# Patient Record
Sex: MALE | Race: WHITE | NOT HISPANIC OR LATINO | Employment: FULL TIME | URBAN - METROPOLITAN AREA
[De-identification: names, ages, dates, MRNs, and addresses within clinical notes are randomized per-mention and may not be internally consistent; named-entity substitution may affect disease eponyms.]

---

## 2017-02-07 DIAGNOSIS — I25.10 CORONARY ARTERY DISEASE INVOLVING NATIVE CORONARY ARTERY OF NATIVE HEART WITHOUT ANGINA PECTORIS: Primary | ICD-10-CM

## 2017-02-07 RX ORDER — METOPROLOL SUCCINATE 25 MG/1
25 TABLET, EXTENDED RELEASE ORAL DAILY
Qty: 90 TABLET | Refills: 3 | Status: SHIPPED | OUTPATIENT
Start: 2017-02-07 | End: 2018-01-30

## 2017-03-07 DIAGNOSIS — I25.10 CORONARY ARTERY DISEASE INVOLVING NATIVE CORONARY ARTERY OF NATIVE HEART, ANGINA PRESENCE UNSPECIFIED: ICD-10-CM

## 2017-03-07 DIAGNOSIS — I21.3 ST ELEVATION MYOCARDIAL INFARCTION (STEMI), UNSPECIFIED ARTERY (H): ICD-10-CM

## 2017-03-07 DIAGNOSIS — E78.5 HYPERLIPIDEMIA LDL GOAL <70: ICD-10-CM

## 2017-03-07 RX ORDER — ROSUVASTATIN CALCIUM 40 MG/1
40 TABLET, COATED ORAL DAILY
Qty: 90 TABLET | Refills: 3 | Status: SHIPPED | OUTPATIENT
Start: 2017-03-07 | End: 2017-03-09

## 2017-03-07 RX ORDER — EZETIMIBE 10 MG/1
10 TABLET ORAL EVERY EVENING
Qty: 90 TABLET | Refills: 3 | Status: SHIPPED | OUTPATIENT
Start: 2017-03-07 | End: 2017-03-09

## 2017-03-07 RX ORDER — LISINOPRIL 10 MG/1
20 TABLET ORAL DAILY
Qty: 180 TABLET | Refills: 3 | Status: SHIPPED | OUTPATIENT
Start: 2017-03-07 | End: 2017-03-09

## 2017-03-07 RX ORDER — PRASUGREL 10 MG/1
10 TABLET, FILM COATED ORAL DAILY
Qty: 90 TABLET | Refills: 3 | Status: SHIPPED | OUTPATIENT
Start: 2017-03-07 | End: 2017-03-09

## 2017-03-09 DIAGNOSIS — E78.5 HYPERLIPIDEMIA LDL GOAL <70: ICD-10-CM

## 2017-03-09 DIAGNOSIS — I25.10 CORONARY ARTERY DISEASE INVOLVING NATIVE CORONARY ARTERY OF NATIVE HEART, ANGINA PRESENCE UNSPECIFIED: ICD-10-CM

## 2017-03-09 DIAGNOSIS — I21.3 ST ELEVATION MYOCARDIAL INFARCTION (STEMI), UNSPECIFIED ARTERY (H): ICD-10-CM

## 2017-03-09 RX ORDER — LISINOPRIL 10 MG/1
20 TABLET ORAL DAILY
Qty: 180 TABLET | Refills: 3 | Status: SHIPPED | OUTPATIENT
Start: 2017-03-09 | End: 2017-08-31

## 2017-03-09 RX ORDER — ROSUVASTATIN CALCIUM 40 MG/1
40 TABLET, COATED ORAL DAILY
Qty: 90 TABLET | Refills: 3 | Status: SHIPPED | OUTPATIENT
Start: 2017-03-09 | End: 2018-02-15

## 2017-03-09 RX ORDER — EZETIMIBE 10 MG/1
10 TABLET ORAL EVERY EVENING
Qty: 90 TABLET | Refills: 3 | Status: SHIPPED | OUTPATIENT
Start: 2017-03-09 | End: 2018-02-15

## 2017-03-09 RX ORDER — PRASUGREL 10 MG/1
10 TABLET, FILM COATED ORAL DAILY
Qty: 90 TABLET | Refills: 3 | Status: SHIPPED | OUTPATIENT
Start: 2017-03-09 | End: 2018-02-15

## 2017-08-31 DIAGNOSIS — I25.10 CORONARY ARTERY DISEASE INVOLVING NATIVE CORONARY ARTERY OF NATIVE HEART, ANGINA PRESENCE UNSPECIFIED: ICD-10-CM

## 2017-08-31 RX ORDER — LISINOPRIL 20 MG/1
20 TABLET ORAL DAILY
Qty: 90 TABLET | Refills: 3 | Status: SHIPPED | OUTPATIENT
Start: 2017-08-31 | End: 2018-02-15

## 2017-10-17 DIAGNOSIS — E78.5 HYPERLIPIDEMIA LDL GOAL <100: Primary | ICD-10-CM

## 2017-12-18 ENCOUNTER — HOSPITAL ENCOUNTER (OUTPATIENT)
Dept: CARDIOLOGY | Facility: CLINIC | Age: 67
Discharge: HOME | End: 2017-12-18
Attending: INTERNAL MEDICINE | Admitting: INTERNAL MEDICINE
Payer: COMMERCIAL

## 2017-12-18 DIAGNOSIS — E78.5 HYPERLIPIDEMIA LDL GOAL <100: ICD-10-CM

## 2017-12-18 DIAGNOSIS — I21.29 ST ELEVATION MYOCARDIAL INFARCTION (STEMI) INVOLVING OTHER CORONARY ARTERY (H): ICD-10-CM

## 2017-12-18 DIAGNOSIS — E78.5 HYPERLIPIDEMIA LDL GOAL <70: ICD-10-CM

## 2017-12-18 DIAGNOSIS — I25.5 ISCHEMIC CARDIOMYOPATHY: ICD-10-CM

## 2017-12-18 DIAGNOSIS — E78.00 HYPERCHOLESTEROLEMIA: ICD-10-CM

## 2017-12-18 DIAGNOSIS — I25.10 CORONARY ARTERY DISEASE INVOLVING NATIVE CORONARY ARTERY OF NATIVE HEART, ANGINA PRESENCE UNSPECIFIED: ICD-10-CM

## 2017-12-18 DIAGNOSIS — I10 BENIGN HYPERTENSION: ICD-10-CM

## 2017-12-18 DIAGNOSIS — I21.3 ST ELEVATION MYOCARDIAL INFARCTION (STEMI), UNSPECIFIED ARTERY (H): ICD-10-CM

## 2017-12-18 DIAGNOSIS — I25.10 CORONARY ARTERY DISEASE INVOLVING NATIVE CORONARY ARTERY OF NATIVE HEART WITHOUT ANGINA PECTORIS: ICD-10-CM

## 2017-12-18 LAB
CHOLEST SERPL-MCNC: 143 MG/DL
HDLC SERPL-MCNC: 50 MG/DL
LDLC SERPL CALC-MCNC: 73 MG/DL
NONHDLC SERPL-MCNC: 93 MG/DL
TRIGL SERPL-MCNC: 101 MG/DL

## 2017-12-18 PROCEDURE — 93325 DOPPLER ECHO COLOR FLOW MAPG: CPT | Mod: 26 | Performed by: INTERNAL MEDICINE

## 2017-12-18 PROCEDURE — 80061 LIPID PANEL: CPT | Performed by: INTERNAL MEDICINE

## 2017-12-18 PROCEDURE — 36415 COLL VENOUS BLD VENIPUNCTURE: CPT | Performed by: INTERNAL MEDICINE

## 2017-12-18 PROCEDURE — 93016 CV STRESS TEST SUPVJ ONLY: CPT | Performed by: INTERNAL MEDICINE

## 2017-12-18 PROCEDURE — 93350 STRESS TTE ONLY: CPT | Mod: 26 | Performed by: INTERNAL MEDICINE

## 2017-12-18 PROCEDURE — 40000264 ECHO STRESS TEST WITH LUMASON

## 2017-12-18 PROCEDURE — 25500064 ZZH RX 255 OP 636: Performed by: INTERNAL MEDICINE

## 2017-12-18 PROCEDURE — 93321 DOPPLER ECHO F-UP/LMTD STD: CPT | Mod: 26 | Performed by: INTERNAL MEDICINE

## 2017-12-18 PROCEDURE — 93018 CV STRESS TEST I&R ONLY: CPT | Performed by: INTERNAL MEDICINE

## 2017-12-18 RX ADMIN — SULFUR HEXAFLUORIDE 5 ML: KIT at 09:21

## 2017-12-28 ENCOUNTER — CARE COORDINATION (OUTPATIENT)
Dept: CARDIOLOGY | Facility: CLINIC | Age: 67
End: 2017-12-28

## 2017-12-28 NOTE — PROGRESS NOTES
Called patient to give him his stress echo results, no ischemia noted.  Patient feels great.  Lipids are WNL.  Patient scheduled to see Dr. Hartmann in 2/2018.

## 2018-01-30 DIAGNOSIS — I25.10 CORONARY ARTERY DISEASE INVOLVING NATIVE CORONARY ARTERY OF NATIVE HEART WITHOUT ANGINA PECTORIS: ICD-10-CM

## 2018-01-30 RX ORDER — METOPROLOL SUCCINATE 25 MG/1
25 TABLET, EXTENDED RELEASE ORAL DAILY
Qty: 15 TABLET | Refills: 0 | Status: SHIPPED | OUTPATIENT
Start: 2018-01-30 | End: 2018-02-15

## 2018-02-09 ENCOUNTER — PRE VISIT (OUTPATIENT)
Dept: CARDIOLOGY | Facility: CLINIC | Age: 68
End: 2018-02-09

## 2018-02-15 ENCOUNTER — OFFICE VISIT (OUTPATIENT)
Dept: CARDIOLOGY | Facility: CLINIC | Age: 68
End: 2018-02-15
Attending: INTERNAL MEDICINE
Payer: COMMERCIAL

## 2018-02-15 VITALS
DIASTOLIC BLOOD PRESSURE: 76 MMHG | HEART RATE: 72 BPM | SYSTOLIC BLOOD PRESSURE: 132 MMHG | WEIGHT: 205 LBS | HEIGHT: 73 IN | BODY MASS INDEX: 27.17 KG/M2

## 2018-02-15 DIAGNOSIS — I21.3 ST ELEVATION MYOCARDIAL INFARCTION (STEMI), UNSPECIFIED ARTERY (H): ICD-10-CM

## 2018-02-15 DIAGNOSIS — I25.10 CORONARY ARTERY DISEASE INVOLVING NATIVE CORONARY ARTERY OF NATIVE HEART, ANGINA PRESENCE UNSPECIFIED: ICD-10-CM

## 2018-02-15 DIAGNOSIS — I25.10 CORONARY ARTERY DISEASE INVOLVING NATIVE CORONARY ARTERY OF NATIVE HEART WITHOUT ANGINA PECTORIS: ICD-10-CM

## 2018-02-15 DIAGNOSIS — I21.29 ST ELEVATION MYOCARDIAL INFARCTION (STEMI) INVOLVING OTHER CORONARY ARTERY (H): ICD-10-CM

## 2018-02-15 DIAGNOSIS — E78.00 HYPERCHOLESTEROLEMIA: ICD-10-CM

## 2018-02-15 DIAGNOSIS — I10 BENIGN HYPERTENSION: ICD-10-CM

## 2018-02-15 DIAGNOSIS — I25.5 ISCHEMIC CARDIOMYOPATHY: ICD-10-CM

## 2018-02-15 DIAGNOSIS — E78.5 HYPERLIPIDEMIA LDL GOAL <70: ICD-10-CM

## 2018-02-15 PROCEDURE — 99214 OFFICE O/P EST MOD 30 MIN: CPT | Performed by: INTERNAL MEDICINE

## 2018-02-15 RX ORDER — PRASUGREL 10 MG/1
10 TABLET, FILM COATED ORAL DAILY
Qty: 90 TABLET | Refills: 3 | Status: SHIPPED | OUTPATIENT
Start: 2018-02-15 | End: 2019-04-29

## 2018-02-15 RX ORDER — METOPROLOL SUCCINATE 25 MG/1
25 TABLET, EXTENDED RELEASE ORAL DAILY
Qty: 90 TABLET | Refills: 3 | Status: CANCELLED | OUTPATIENT
Start: 2018-02-15

## 2018-02-15 RX ORDER — METOPROLOL SUCCINATE 25 MG/1
25 TABLET, EXTENDED RELEASE ORAL DAILY
Qty: 90 TABLET | Refills: 3 | Status: SHIPPED | OUTPATIENT
Start: 2018-02-15 | End: 2019-02-05

## 2018-02-15 RX ORDER — EZETIMIBE 10 MG/1
10 TABLET ORAL EVERY EVENING
Qty: 90 TABLET | Refills: 3 | Status: SHIPPED | OUTPATIENT
Start: 2018-02-15 | End: 2019-03-25

## 2018-02-15 RX ORDER — LISINOPRIL 20 MG/1
20 TABLET ORAL DAILY
Qty: 90 TABLET | Refills: 3 | Status: SHIPPED | OUTPATIENT
Start: 2018-02-15 | End: 2019-04-23

## 2018-02-15 RX ORDER — ROSUVASTATIN CALCIUM 40 MG/1
40 TABLET, COATED ORAL DAILY
Qty: 90 TABLET | Refills: 3 | Status: SHIPPED | OUTPATIENT
Start: 2018-02-15 | End: 2019-04-22

## 2018-02-15 RX ORDER — NITROGLYCERIN 0.4 MG/1
0.4 TABLET SUBLINGUAL EVERY 5 MIN PRN
Qty: 25 TABLET | Refills: 3 | Status: SHIPPED | OUTPATIENT
Start: 2018-02-15 | End: 2019-08-21

## 2018-02-15 NOTE — MR AVS SNAPSHOT
After Visit Summary   2/15/2018    Bridger Castillo    MRN: [id number]           Patient Information     Date Of Birth          1950        Visit Information        Provider Department      2/15/2018 1:45 PM August Hartmann MD [facility]        Today's Diagnoses     ST elevation myocardial infarction (STEMI) involving other coronary artery (H)        Benign hypertension        Hypercholesterolemia        Ischemic cardiomyopathy        Hyperlipidemia LDL goal <70        ST elevation myocardial infarction (STEMI), unspecified artery (H)        Coronary artery disease involving native coronary artery of native heart without angina pectoris        Coronary artery disease involving native coronary artery of native heart, angina presence unspecified           Follow-ups after your visit        Additional Services     Follow-Up with Cardiologist                 Future tests that were ordered for you today     Open Future Orders        Priority Expected Expires Ordered    Lipid Profile Routine 2/15/2019 2/15/2019 2/15/2018    ALT Routine 2/15/2019 2/15/2019 2/15/2018    Follow-Up with Cardiologist Routine 2/15/2019 2/16/2019 2/15/2018            Who to contact     If you have questions or need follow up information about today's clinic visit or your schedule please contact [facility] directly at [phone].  Normal or non-critical lab and imaging results will be communicated to you by MyChart, letter or phone within 4 business days after the clinic has received the results. If you do not hear from us within 7 days, please contact the clinic through MyChart or phone. If you have a critical or abnormal lab result, we will notify you by phone as soon as possible.  Submit refill requests through [employer] or call your pharmacy and they will forward the refill request to us. Please allow 3 business days for your refill to be  "completed.          Additional Information About Your Visit        [employer] Information     [employer] gives you secure access to your electronic health record. If you see a primary care provider, you can also send messages to your care team and make appointments. If you have questions, please call your primary care clinic.  If you do not have a primary care provider, please call [phone] and they will assist you.        Care EveryWhere ID     This is your Care EveryWhere ID. This could be used by other organizations to access your [facility] medical records  [id number]        Your Vitals Were     Pulse Height BMI (Body Mass Index)             72 1.842 m (6' 0.5\") 27.42 kg/m2          Blood Pressure from Last 3 Encounters:   02/15/18 132/76   12/12/16 130/76   12/21/15 148/80    Weight from Last 3 Encounters:   02/15/18 93 kg (205 lb)   12/12/16 90.8 kg (200 lb 1.6 oz)   12/21/15 90.7 kg (200 lb)              We Performed the Following     Follow-Up with Cardiologist          Where to get your medicines      These medications were sent to [place] [id number] [place] [address]  [address] [address]     Phone:  [phone]     ezetimibe 10 MG tablet    lisinopril 20 MG tablet    metoprolol succinate 25 MG 24 hr tablet    nitroGLYcerin 0.4 MG sublingual tablet    prasugrel 10 MG Tabs tablet    rosuvastatin 40 MG tablet          Primary Care Provider Office Phone # Fax #    Gustavo Neumann [phone]       [facility] [address]  [facility] [address]        Equal Access to Services     [facility]: Hadii randall Willard, waaxda luqadaha, qaybta kaalandre carvalho. So [facility] [phone].    ATENCIÓN: Si habla español, tiene a beltran disposición servicios gratuitos de asistencia lingüística. Zion al [phone].    We comply with applicable federal civil rights laws and [place] laws. We do not discriminate on the basis " of race, color, national origin, age, disability, sex, sexual orientation, or gender identity.            Thank you!     Thank you for choosing [facility]  for your care. Our goal is always to provide you with excellent care. Hearing back from our patients is one way we can continue to improve our services. Please take a few minutes to complete the written survey that you may receive in the mail after your visit with us. Thank you!             Your Updated Medication List - Protect others around you: Learn how to safely use, store and throw away your medicines at www.disposemymeds.org.          This list is accurate as of 2/15/18  2:18 PM.  Always use your most recent med list.                   Brand Name Dispense Instructions for use Diagnosis    aspirin 81 MG tablet      Take 1 tablet by mouth daily.        cinnamon 500 MG Caps      Take 500 mg by mouth daily        ezetimibe 10 MG tablet    ZETIA    90 tablet    Take 1 tablet (10 mg) by mouth every evening    ST elevation myocardial infarction (STEMI), unspecified artery (H)       lisinopril 20 MG tablet    PRINIVIL/ZESTRIL    90 tablet    Take 1 tablet (20 mg) by mouth daily    Coronary artery disease involving native coronary artery of native heart, angina presence unspecified       metoprolol succinate 25 MG 24 hr tablet    TOPROL-XL    90 tablet    Take 1 tablet (25 mg) by mouth daily    Coronary artery disease involving native coronary artery of native heart without angina pectoris       nitroGLYcerin 0.4 MG sublingual tablet    NITROSTAT    25 tablet    Place 1 tablet (0.4 mg) under the tongue every 5 minutes as needed for chest pain    ST elevation myocardial infarction (STEMI) involving other coronary artery (H), Benign hypertension, Hypercholesterolemia, Ischemic cardiomyopathy, Hyperlipidemia LDL goal <70, ST elevation myocardial infarction (STEMI), unspecified artery (H), Coronary artery disease involving native  coronary artery of native heart without angina pectoris, Coronary artery disease involving native coronary artery of native heart, angina presence unspecified       prasugrel 10 MG Tabs tablet    EFFIENT    90 tablet    Take 1 tablet (10 mg) by mouth daily    ST elevation myocardial infarction (STEMI), unspecified artery (H)       rosuvastatin 40 MG tablet    CRESTOR    90 tablet    Take 1 tablet (40 mg) by mouth daily    Hyperlipidemia LDL goal <70       vitamin D 2000 UNITS Caps      Take 2,000 Units by mouth daily

## 2018-02-15 NOTE — LETTER
2/15/2018      Gustavo Neumann MD  [facility] [address]  [place] [address]      RE: Acosta Martinez       Dear Colleague,    I had the pleasure of seeing Acosta Martinez in the [facility].    Service Date: 02/15/2018      HISTORY OF PRESENT ILLNESS:  Mr. Martinez is a very pleasant, 67-year-old gentleman with a history of inferior ST segment elevation myocardial infarction in .  He underwent aspiration atherectomy and coronary stent placement at that time.  He had moderate disease elsewhere.  His ejection fraction was initially at 40%-45% and now has normalized on medical therapy and revascularization.  He continues to do well.  He is still working full-time for [employer].  He exercises  in the summer and springtime with biking, but does not do anything over the wintertime.  He denies any chest pain, chest pressure, shortness of breath, heart racing, palpitations or edema.  He did a stress echocardiogram in 2017, which was negative for any evidence of ischemia or arrhythmias.      ASSESSMENT AND PLAN:  It is a delight seeing Mr. Martinez back in clinic doing well from a cardiovascular standpoint.  His blood pressure is well controlled as are his lipids.  His stress test is negative.  We will continue on current medical therapy.  We will plan on seeing him back in 1 year's time with a followup lipid profile.  We did talk about trying to get in exercise during the winter months as well that he could do, and he agrees with that plan.         HANSEL ALVAREZ MD [facility]             D: 02/15/2018   T: 2018   MT: yuly      Name:     ACOSTA MARTINEZ   MRN:      [id number]        Account:      [id number]   :      1950           Service Date: 02/15/2018      Document: [id number]      Outpatient Encounter Prescriptions as of 2/15/2018   Medication Sig Dispense Refill     lisinopril (PRINIVIL/ZESTRIL) 20 MG tablet Take 1 tablet (20 mg) by mouth daily 90  tablet 3     rosuvastatin (CRESTOR) 40 MG tablet Take 1 tablet (40 mg) by mouth daily 90 tablet 3     prasugrel (EFFIENT) 10 MG TABS tablet Take 1 tablet (10 mg) by mouth daily 90 tablet 3     ezetimibe (ZETIA) 10 MG tablet Take 1 tablet (10 mg) by mouth every evening 90 tablet 3     nitroGLYcerin (NITROSTAT) 0.4 MG sublingual tablet Place 1 tablet (0.4 mg) under the tongue every 5 minutes as needed for chest pain 25 tablet 3     [DISCONTINUED] metoprolol succinate (TOPROL-XL) 25 MG 24 hr tablet Take 1 tablet (25 mg) by mouth daily 15 tablet 0     Cholecalciferol (VITAMIN D) 2000 UNITS CAPS Take 2,000 Units by mouth daily       cinnamon 500 MG CAPS Take 500 mg by mouth daily       aspirin 81 MG tablet Take 1 tablet by mouth daily.       [DISCONTINUED] lisinopril (PRINIVIL/ZESTRIL) 20 MG tablet Take 1 tablet (20 mg) by mouth daily 90 tablet 3     [DISCONTINUED] rosuvastatin (CRESTOR) 40 MG tablet Take 1 tablet (40 mg) by mouth daily 90 tablet 3     [DISCONTINUED] prasugrel (EFFIENT) 10 MG TABS tablet Take 1 tablet (10 mg) by mouth daily 90 tablet 3     [DISCONTINUED] ezetimibe (ZETIA) 10 MG tablet Take 1 tablet (10 mg) by mouth every evening 90 tablet 3     [DISCONTINUED] nitroGLYCERIN (NITROSTAT) 0.4 MG SL tablet Place 1 tablet under the tongue every 5 minutes as needed for chest pain. 25 tablet 0     No facility-administered encounter medications on file as of 2/15/2018.      Again, thank you for allowing me to participate in the care of your patient.      Sincerely,    HANSEL ALVAREZ MD     [facility]

## 2018-02-15 NOTE — PROGRESS NOTES
HPI and Plan:   See dictation    No orders of the defined types were placed in this encounter.    No orders of the defined types were placed in this encounter.    There are no discontinued medications.      Encounter Diagnoses   Name Primary?     ST elevation myocardial infarction (STEMI) involving other coronary artery (H)      Benign hypertension      Hypercholesterolemia      Ischemic cardiomyopathy      Hyperlipidemia LDL goal <70      ST elevation myocardial infarction (STEMI), unspecified artery (H)      Coronary artery disease involving native coronary artery of native heart without angina pectoris      Coronary artery disease involving native coronary artery of native heart, angina presence unspecified      STEMI (ST elevation myocardial infarction) (H)      Chest pain        CURRENT MEDICATIONS:  Current Outpatient Prescriptions   Medication Sig Dispense Refill     [DISCONTINUED] metoprolol succinate (TOPROL-XL) 25 MG 24 hr tablet Take 1 tablet (25 mg) by mouth daily 15 tablet 0     lisinopril (PRINIVIL/ZESTRIL) 20 MG tablet Take 1 tablet (20 mg) by mouth daily 90 tablet 3     rosuvastatin (CRESTOR) 40 MG tablet Take 1 tablet (40 mg) by mouth daily 90 tablet 3     prasugrel (EFFIENT) 10 MG TABS tablet Take 1 tablet (10 mg) by mouth daily 90 tablet 3     ezetimibe (ZETIA) 10 MG tablet Take 1 tablet (10 mg) by mouth every evening 90 tablet 3     Cholecalciferol (VITAMIN D) 2000 UNITS CAPS Take 2,000 Units by mouth daily       cinnamon 500 MG CAPS Take 500 mg by mouth daily       nitroGLYCERIN (NITROSTAT) 0.4 MG SL tablet Place 1 tablet under the tongue every 5 minutes as needed for chest pain. 25 tablet 0     aspirin 81 MG tablet Take 1 tablet by mouth daily.       metoprolol succinate (TOPROL-XL) 25 MG 24 hr tablet Take 1 tablet (25 mg) by mouth daily 90 tablet 3       ALLERGIES   No Known Allergies    PAST MEDICAL HISTORY:  Past Medical History:   Diagnosis Date     Arthritis      Hyperlipemia       "Hypertension      Myocardial infarction     2011 STEMI     Post PTCA     JENNIFER to RCA 2011       PAST SURGICAL HISTORY:  Past Surgical History:   Procedure Laterality Date     HEART CATH, ANGIOPLASTY      JENNIFER RCA 2011, 70% stenosis LAD     ORTHOPEDIC SURGERY         FAMILY HISTORY:  Family History   Problem Relation Age of Onset     Hypertension Brother      Hypertension Father      Cancer - colorectal Father      CANCER Mother      lung     Other - See Comments Brother      heamachromatosis       SOCIAL HISTORY:  Social History     Social History     Marital status:      Spouse name: N/A     Number of children: N/A     Years of education: N/A     Social History Main Topics     Smoking status: Never Smoker     Smokeless tobacco: Never Used     Alcohol use 0.5 oz/week     1 Cans of beer per week      Comment: 1 drink  week     Drug use: No     Sexual activity: Not Asked     Other Topics Concern     Caffeine Concern Yes     ice tea 16oz     Special Diet No     Exercise Yes     biking, 10,000 steps daily      Seat Belt Yes     Social History Narrative       Review of Systems:  Skin:  Negative     Eyes:  Positive for glasses  ENT:  Negative    Respiratory:  Negative    Cardiovascular:  Negative    Gastroenterology: Negative    Genitourinary:  Negative    Musculoskeletal:  Negative joint pain  Neurologic:  Negative    Psychiatric:  Negative    Heme/Lymph/Imm:  Negative    Endocrine:  Negative      Physical Exam:  Vitals: /76  Pulse 72  Ht 1.842 m (6' 0.5\")  Wt 93 kg (205 lb)  BMI 27.42 kg/m2    Constitutional:  cooperative, alert and oriented, well developed, well nourished, in no acute distress        Skin:  warm and dry to the touch, no apparent skin lesions or masses noted          Head:  normocephalic, no masses or lesions        Eyes:  pupils equal and round, conjunctivae and lids unremarkable, sclera white, no xanthalasma, EOMS intact, no nystagmus        Lymph:      ENT:  no pallor or cyanosis, " dentition good        Neck:           Respiratory:  normal breath sounds, clear to auscultation, normal A-P diameter, normal symmetry, normal respiratory excursion, no use of accessory muscles         Cardiac: regular rhythm, normal S1/S2, no S3 or S4, apical impulse not displaced, no murmurs, gallops or rubs     no presence of murmur          pulses full and equal, no bruits auscultated                                        GI:  abdomen soft, non-tender, BS normoactive, no mass, no HSM, no bruits        Extremities and Muscular Skeletal:  no deformities, clubbing, cyanosis, erythema observed              Neurological:           Psych:           Recent Lab Results:  LIPID RESULTS:  Lab Results   Component Value Date    CHOL 143 12/18/2017    HDL 50 12/18/2017    LDL 73 12/18/2017    TRIG 101 12/18/2017    CHOLHDLRATIO 2.7 03/27/2015       LIVER ENZYME RESULTS:  Lab Results   Component Value Date    AST 28 10/21/2014    ALT 26 10/21/2014       CBC RESULTS:  Lab Results   Component Value Date    WBC 12.5 (H) 12/20/2011    RBC 4.79 12/20/2011    HGB 16.1 10/21/2014    HCT 44.3 12/20/2011    MCV 93 12/20/2011    MCH 32.4 12/20/2011    MCHC 35.0 12/20/2011    RDW 12.2 12/20/2011     12/20/2011       BMP RESULTS:  Lab Results   Component Value Date     10/21/2014    POTASSIUM 4.4 10/21/2014    CHLORIDE 104 10/21/2014    CO2 24 12/20/2011    ANIONGAP 12 10/21/2014    GLC 89 10/21/2014    BUN 20 10/21/2014    CR 1.34 10/21/2014    GFRESTIMATED 85 12/20/2011    GFRESTBLACK >60 10/21/2014    RYANNE 10.2 10/21/2014        A1C RESULTS:  No results found for: A1C    INR RESULTS:  Lab Results   Component Value Date    INR 0.85 (L) 12/19/2011           CC  August Hartmann MD  [address]  [place] [id number]

## 2018-02-15 NOTE — LETTER
2/15/2018    Gustavo Neumann MD  [facility] [address]  [place] [address]    RE: Bridger Castillo       Dear Colleague,    I had the pleasure of seeing Bridger Castillo in the [facility].    HPI and Plan:   See dictation    No orders of the defined types were placed in this encounter.    No orders of the defined types were placed in this encounter.    There are no discontinued medications.      Encounter Diagnoses   Name Primary?     ST elevation myocardial infarction (STEMI) involving other coronary artery (H)      Benign hypertension      Hypercholesterolemia      Ischemic cardiomyopathy      Hyperlipidemia LDL goal <70      ST elevation myocardial infarction (STEMI), unspecified artery (H)      Coronary artery disease involving native coronary artery of native heart without angina pectoris      Coronary artery disease involving native coronary artery of native heart, angina presence unspecified      STEMI (ST elevation myocardial infarction) (H)      Chest pain        CURRENT MEDICATIONS:  Current Outpatient Prescriptions   Medication Sig Dispense Refill     [DISCONTINUED] metoprolol succinate (TOPROL-XL) 25 MG 24 hr tablet Take 1 tablet (25 mg) by mouth daily 15 tablet 0     lisinopril (PRINIVIL/ZESTRIL) 20 MG tablet Take 1 tablet (20 mg) by mouth daily 90 tablet 3     rosuvastatin (CRESTOR) 40 MG tablet Take 1 tablet (40 mg) by mouth daily 90 tablet 3     prasugrel (EFFIENT) 10 MG TABS tablet Take 1 tablet (10 mg) by mouth daily 90 tablet 3     ezetimibe (ZETIA) 10 MG tablet Take 1 tablet (10 mg) by mouth every evening 90 tablet 3     Cholecalciferol (VITAMIN D) 2000 UNITS CAPS Take 2,000 Units by mouth daily       cinnamon 500 MG CAPS Take 500 mg by mouth daily       nitroGLYCERIN (NITROSTAT) 0.4 MG SL tablet Place 1 tablet under the tongue every 5 minutes as needed for chest pain. 25 tablet 0     aspirin 81 MG tablet Take 1 tablet by mouth daily.       metoprolol  "succinate (TOPROL-XL) 25 MG 24 hr tablet Take 1 tablet (25 mg) by mouth daily 90 tablet 3       ALLERGIES   No Known Allergies    PAST MEDICAL HISTORY:  Past Medical History:   Diagnosis Date     Arthritis      Hyperlipemia      Hypertension      Myocardial infarction     2011 STEMI     Post PTCA     JENNIFER to RCA 2011       PAST SURGICAL HISTORY:  Past Surgical History:   Procedure Laterality Date     HEART CATH, ANGIOPLASTY      JENNIFER RCA 2011, 70% stenosis LAD     ORTHOPEDIC SURGERY         FAMILY HISTORY:  Family History   Problem Relation Age of Onset     Hypertension Brother      Hypertension Father      Cancer - colorectal Father      CANCER Mother      lung     Other - See Comments Brother      heamachromatosis       SOCIAL HISTORY:  Social History     Social History     Marital status:      Spouse name: N/A     Number of children: N/A     Years of education: N/A     Social History Main Topics     Smoking status: Never Smoker     Smokeless tobacco: Never Used     Alcohol use 0.5 oz/week     1 Cans of beer per week      Comment: 1 drink  week     Drug use: No     Sexual activity: Not Asked     Other Topics Concern     Caffeine Concern Yes     ice tea 16oz     Special Diet No     Exercise Yes     biking, 10,000 steps daily      Seat Belt Yes     Social History Narrative       Review of Systems:  Skin:  Negative     Eyes:  Positive for glasses  ENT:  Negative    Respiratory:  Negative    Cardiovascular:  Negative    Gastroenterology: Negative    Genitourinary:  Negative    Musculoskeletal:  Negative joint pain  Neurologic:  Negative    Psychiatric:  Negative    Heme/Lymph/Imm:  Negative    Endocrine:  Negative      Physical Exam:  Vitals: /76  Pulse 72  Ht 1.842 m (6' 0.5\")  Wt 93 kg (205 lb)  BMI 27.42 kg/m2    Constitutional:  cooperative, alert and oriented, well developed, well nourished, in no acute distress        Skin:  warm and dry to the touch, no apparent skin lesions or masses noted      "     Head:  normocephalic, no masses or lesions        Eyes:  pupils equal and round, conjunctivae and lids unremarkable, sclera white, no xanthalasma, EOMS intact, no nystagmus        Lymph:      ENT:  no pallor or cyanosis, dentition good        Neck:           Respiratory:  normal breath sounds, clear to auscultation, normal A-P diameter, normal symmetry, normal respiratory excursion, no use of accessory muscles         Cardiac: regular rhythm, normal S1/S2, no S3 or S4, apical impulse not displaced, no murmurs, gallops or rubs     no presence of murmur          pulses full and equal, no bruits auscultated                                        GI:  abdomen soft, non-tender, BS normoactive, no mass, no HSM, no bruits        Extremities and Muscular Skeletal:  no deformities, clubbing, cyanosis, erythema observed              Neurological:           Psych:           Recent Lab Results:  LIPID RESULTS:  Lab Results   Component Value Date    CHOL 143 12/18/2017    HDL 50 12/18/2017    LDL 73 12/18/2017    TRIG 101 12/18/2017    CHOLHDLRATIO 2.7 03/27/2015       LIVER ENZYME RESULTS:  Lab Results   Component Value Date    AST 28 10/21/2014    ALT 26 10/21/2014       CBC RESULTS:  Lab Results   Component Value Date    WBC 12.5 (H) 12/20/2011    RBC 4.79 12/20/2011    HGB 16.1 10/21/2014    HCT 44.3 12/20/2011    MCV 93 12/20/2011    MCH 32.4 12/20/2011    MCHC 35.0 12/20/2011    RDW 12.2 12/20/2011     12/20/2011       BMP RESULTS:  Lab Results   Component Value Date     10/21/2014    POTASSIUM 4.4 10/21/2014    CHLORIDE 104 10/21/2014    CO2 24 12/20/2011    ANIONGAP 12 10/21/2014    GLC 89 10/21/2014    BUN 20 10/21/2014    CR 1.34 10/21/2014    GFRESTIMATED 85 12/20/2011    GFRESTBLACK >60 10/21/2014    RYANNE 10.2 10/21/2014        A1C RESULTS:  No results found for: A1C    INR RESULTS:  Lab Results   Component Value Date    INR 0.85 (L) 12/19/2011           CC  August Hartmann MD  [address]  [address]  [place] [id number]                  Thank you for allowing me to participate in the care of your patient.      Sincerely,     AUGUST ALVAREZ MD     [facility]    cc:   August Alvarez MD  [address]  [place] [id number]

## 2018-02-16 NOTE — PROGRESS NOTES
Service Date: 02/15/2018      HISTORY OF PRESENT ILLNESS:  Mr. Martinez is a very pleasant, 67-year-old gentleman with a history of inferior ST segment elevation myocardial infarction in .  He underwent aspiration atherectomy and coronary stent placement at that time.  He had moderate disease elsewhere.  His ejection fraction was initially at 40%-45% and now has normalized on medical therapy and revascularization.  He continues to do well.  He is still working full-time for [employer].  He exercises  in the summer and springtime with biking, but does not do anything over the wintertime.  He denies any chest pain, chest pressure, shortness of breath, heart racing, palpitations or edema.  He did a stress echocardiogram in 2017, which was negative for any evidence of ischemia or arrhythmias.      ASSESSMENT AND PLAN:  It is a delight seeing Mr. Martinez back in clinic doing well from a cardiovascular standpoint.  His blood pressure is well controlled as are his lipids.  His stress test is negative.  We will continue on current medical therapy.  We will plan on seeing him back in 1 year's time with a followup lipid profile.  We did talk about trying to get in exercise during the winter months as well that he could do, and he agrees with that plan.         HANSEL ALVAREZ MD [facility]             D: 02/15/2018   T: 2018   MT: yuly      Name:     ACOSTA MARTINEZ   MRN:      [id number]        Account:      [id number]   :      1950           Service Date: 02/15/2018      Document: [id number]

## 2019-02-05 DIAGNOSIS — I25.10 CORONARY ARTERY DISEASE INVOLVING NATIVE CORONARY ARTERY OF NATIVE HEART WITHOUT ANGINA PECTORIS: ICD-10-CM

## 2019-02-05 RX ORDER — METOPROLOL SUCCINATE 25 MG/1
25 TABLET, EXTENDED RELEASE ORAL DAILY
Qty: 90 TABLET | Refills: 0 | Status: SHIPPED | OUTPATIENT
Start: 2019-02-05 | End: 2019-05-08

## 2019-03-25 DIAGNOSIS — I21.3 ST ELEVATION MYOCARDIAL INFARCTION (STEMI), UNSPECIFIED ARTERY (H): ICD-10-CM

## 2019-03-25 RX ORDER — EZETIMIBE 10 MG/1
10 TABLET ORAL EVERY EVENING
Qty: 90 TABLET | Refills: 0 | Status: SHIPPED | OUTPATIENT
Start: 2019-03-25 | End: 2019-05-29

## 2019-04-22 DIAGNOSIS — E78.5 HYPERLIPIDEMIA LDL GOAL <70: ICD-10-CM

## 2019-04-22 RX ORDER — ROSUVASTATIN CALCIUM 40 MG/1
40 TABLET, COATED ORAL DAILY
Qty: 90 TABLET | Refills: 0 | Status: SHIPPED | OUTPATIENT
Start: 2019-04-22 | End: 2019-05-29

## 2019-04-23 DIAGNOSIS — I25.10 CORONARY ARTERY DISEASE INVOLVING NATIVE CORONARY ARTERY OF NATIVE HEART, ANGINA PRESENCE UNSPECIFIED: ICD-10-CM

## 2019-04-23 RX ORDER — LISINOPRIL 20 MG/1
20 TABLET ORAL DAILY
Qty: 90 TABLET | Refills: 0 | Status: SHIPPED | OUTPATIENT
Start: 2019-04-23 | End: 2019-05-29

## 2019-04-29 DIAGNOSIS — I21.3 ST ELEVATION MYOCARDIAL INFARCTION (STEMI), UNSPECIFIED ARTERY (H): ICD-10-CM

## 2019-04-29 RX ORDER — PRASUGREL 10 MG/1
10 TABLET, FILM COATED ORAL DAILY
Qty: 30 TABLET | Refills: 0 | Status: SHIPPED | OUTPATIENT
Start: 2019-04-29 | End: 2019-05-29

## 2019-05-08 DIAGNOSIS — I25.10 CORONARY ARTERY DISEASE INVOLVING NATIVE CORONARY ARTERY OF NATIVE HEART WITHOUT ANGINA PECTORIS: ICD-10-CM

## 2019-05-08 RX ORDER — METOPROLOL SUCCINATE 25 MG/1
25 TABLET, EXTENDED RELEASE ORAL DAILY
Qty: 90 TABLET | Refills: 0 | Status: SHIPPED | OUTPATIENT
Start: 2019-05-08 | End: 2019-05-29

## 2019-05-29 ENCOUNTER — OFFICE VISIT (OUTPATIENT)
Dept: CARDIOLOGY | Facility: CLINIC | Age: 69
End: 2019-05-29
Payer: COMMERCIAL

## 2019-05-29 VITALS
WEIGHT: 206 LBS | HEIGHT: 73 IN | DIASTOLIC BLOOD PRESSURE: 74 MMHG | SYSTOLIC BLOOD PRESSURE: 121 MMHG | BODY MASS INDEX: 27.3 KG/M2 | HEART RATE: 70 BPM

## 2019-05-29 DIAGNOSIS — I25.10 CORONARY ARTERY DISEASE INVOLVING NATIVE CORONARY ARTERY OF NATIVE HEART, ANGINA PRESENCE UNSPECIFIED: ICD-10-CM

## 2019-05-29 DIAGNOSIS — E78.5 HYPERLIPIDEMIA LDL GOAL <70: ICD-10-CM

## 2019-05-29 DIAGNOSIS — I21.29 ST ELEVATION MYOCARDIAL INFARCTION (STEMI) INVOLVING OTHER CORONARY ARTERY (H): ICD-10-CM

## 2019-05-29 DIAGNOSIS — I21.3 ST ELEVATION MYOCARDIAL INFARCTION (STEMI), UNSPECIFIED ARTERY (H): ICD-10-CM

## 2019-05-29 DIAGNOSIS — I25.10 CORONARY ARTERY DISEASE INVOLVING NATIVE CORONARY ARTERY OF NATIVE HEART WITHOUT ANGINA PECTORIS: ICD-10-CM

## 2019-05-29 DIAGNOSIS — E78.00 HYPERCHOLESTEROLEMIA: ICD-10-CM

## 2019-05-29 DIAGNOSIS — I25.10 CORONARY ARTERY DISEASE INVOLVING NATIVE CORONARY ARTERY OF NATIVE HEART WITHOUT ANGINA PECTORIS: Primary | ICD-10-CM

## 2019-05-29 DIAGNOSIS — I25.5 ISCHEMIC CARDIOMYOPATHY: ICD-10-CM

## 2019-05-29 DIAGNOSIS — E78.5 HYPERLIPIDEMIA LDL GOAL <100: ICD-10-CM

## 2019-05-29 DIAGNOSIS — I10 BENIGN HYPERTENSION: ICD-10-CM

## 2019-05-29 LAB
ALT SERPL W P-5'-P-CCNC: 16 U/L (ref 5–30)
CHOLEST SERPL-MCNC: 147 MG/DL
HDLC SERPL-MCNC: 49 MG/DL
LDLC SERPL CALC-MCNC: 71 MG/DL
NONHDLC SERPL-MCNC: 98 MG/DL
TRIGL SERPL-MCNC: 136 MG/DL

## 2019-05-29 PROCEDURE — 99214 OFFICE O/P EST MOD 30 MIN: CPT | Performed by: INTERNAL MEDICINE

## 2019-05-29 PROCEDURE — 80061 LIPID PANEL: CPT | Performed by: INTERNAL MEDICINE

## 2019-05-29 PROCEDURE — 84460 ALANINE AMINO (ALT) (SGPT): CPT | Performed by: INTERNAL MEDICINE

## 2019-05-29 PROCEDURE — 36415 COLL VENOUS BLD VENIPUNCTURE: CPT | Performed by: INTERNAL MEDICINE

## 2019-05-29 RX ORDER — ROSUVASTATIN CALCIUM 40 MG/1
40 TABLET, COATED ORAL DAILY
Qty: 90 TABLET | Refills: 11 | Status: SHIPPED | OUTPATIENT
Start: 2019-05-29 | End: 2020-06-16

## 2019-05-29 RX ORDER — PRASUGREL 10 MG/1
10 TABLET, FILM COATED ORAL DAILY
Qty: 90 TABLET | Refills: 3 | Status: SHIPPED | OUTPATIENT
Start: 2019-05-29 | End: 2020-07-06

## 2019-05-29 RX ORDER — PRASUGREL 10 MG/1
10 TABLET, FILM COATED ORAL DAILY
Qty: 30 TABLET | Refills: 11 | Status: SHIPPED | OUTPATIENT
Start: 2019-05-29 | End: 2019-05-29

## 2019-05-29 RX ORDER — METOPROLOL SUCCINATE 25 MG/1
25 TABLET, EXTENDED RELEASE ORAL DAILY
Qty: 90 TABLET | Refills: 11 | Status: SHIPPED | OUTPATIENT
Start: 2019-05-29 | End: 2020-07-06

## 2019-05-29 RX ORDER — LISINOPRIL 20 MG/1
20 TABLET ORAL DAILY
Qty: 90 TABLET | Refills: 11 | Status: SHIPPED | OUTPATIENT
Start: 2019-05-29 | End: 2020-07-06

## 2019-05-29 RX ORDER — EZETIMIBE 10 MG/1
10 TABLET ORAL EVERY EVENING
Qty: 90 TABLET | Refills: 11 | Status: SHIPPED | OUTPATIENT
Start: 2019-05-29 | End: 2020-06-01

## 2019-05-29 ASSESSMENT — MIFFLIN-ST. JEOR: SCORE: 1768.29

## 2019-05-29 NOTE — LETTER
2019      Gustavo Neumann MD  [facility] [address]  [place] [address]      RE: Acosta Martinez       Dear Colleague,    I had the pleasure of seeing Acosta Martinez in the [facility].    Service Date: 2019      HISTORY OF PRESENT ILLNESS:  Mr. Martinez is a delightful 68-year-old gentleman with a history of presentation in  with an inferior ST segment myocardial infarction.  He underwent aspiration atherectomy and coronary stent placement at that time.  He had moderate disease elsewhere and his ejection fraction at the time was 40%-45%.  With revascularization and medical therapy, he has achieved normal left ventricular function.  He continues to be active, riding his bike frequently.  He works full-time at [employer] and he has no chest pain, chest pressure, shortness of breath, heart racing or palpitations.  His last stress test was in 2017 which was negative.  His blood pressure has been controlled.  He has been taking his medications.  His LDL cholesterol from today is 71.      ASSESSMENT AND PLAN:  It is a delight seeing Mr. Martinez back in clinic, asymptomatic and doing well, now 8 years past myocardial infarction.  Plan on stress test in 1 year's time or sooner followup if necessary.  No changes are made in today's medical regimen.         HANSEL ALVAREZ MD [facility]             D: 2019   T: 2019   MT: VIKA      Name:     ACOSTA MARTINEZ   MRN:      [id number]        Account:      [id number]   :      1950           Service Date: 2019      Document: [id number]         Outpatient Encounter Medications as of 2019   Medication Sig Dispense Refill     aspirin 81 MG tablet Take 1 tablet by mouth daily.       Cholecalciferol (VITAMIN D) 2000 UNITS CAPS Take 2,000 Units by mouth daily       cinnamon 500 MG CAPS Take 500 mg by mouth daily       ezetimibe (ZETIA) 10 MG tablet Take 1 tablet (10 mg) by mouth every evening 90  tablet 11     lisinopril (PRINIVIL/ZESTRIL) 20 MG tablet Take 1 tablet (20 mg) by mouth daily 90 tablet 11     metoprolol succinate ER (TOPROL-XL) 25 MG 24 hr tablet Take 1 tablet (25 mg) by mouth daily 90 tablet 11     nitroGLYcerin (NITROSTAT) 0.4 MG sublingual tablet Place 1 tablet (0.4 mg) under the tongue every 5 minutes as needed for chest pain 25 tablet 3     rosuvastatin (CRESTOR) 40 MG tablet Take 1 tablet (40 mg) by mouth daily 90 tablet 11     [DISCONTINUED] prasugrel (EFFIENT) 10 MG TABS tablet Take 1 tablet (10 mg) by mouth daily 30 tablet 11     [DISCONTINUED] ezetimibe (ZETIA) 10 MG tablet Take 1 tablet (10 mg) by mouth every evening 90 tablet 0     [DISCONTINUED] lisinopril (PRINIVIL/ZESTRIL) 20 MG tablet Take 1 tablet (20 mg) by mouth daily 90 tablet 0     [DISCONTINUED] metoprolol succinate ER (TOPROL-XL) 25 MG 24 hr tablet Take 1 tablet (25 mg) by mouth daily 90 tablet 0     [DISCONTINUED] prasugrel (EFFIENT) 10 MG TABS tablet Take 1 tablet (10 mg) by mouth daily 30 tablet 0     [DISCONTINUED] rosuvastatin (CRESTOR) 40 MG tablet Take 1 tablet (40 mg) by mouth daily 90 tablet 0     No facility-administered encounter medications on file as of 5/29/2019.        Again, thank you for allowing me to participate in the care of your patient.      Sincerely,    HANSEL ALVAREZ MD     [facility]

## 2019-05-29 NOTE — PROGRESS NOTES
Service Date: 2019      HISTORY OF PRESENT ILLNESS:  Mr. Martinez is a delightful 68-year-old gentleman with a history of presentation in  with an inferior ST segment myocardial infarction.  He underwent aspiration atherectomy and coronary stent placement at that time.  He had moderate disease elsewhere and his ejection fraction at the time was 40%-45%.  With revascularization and medical therapy, he has achieved normal left ventricular function.  He continues to be active, riding his bike frequently.  He works full-time at [employer] and he has no chest pain, chest pressure, shortness of breath, heart racing or palpitations.  His last stress test was in 2017 which was negative.  His blood pressure has been controlled.  He has been taking his medications.  His LDL cholesterol from today is 71.      ASSESSMENT AND PLAN:  It is a delight seeing Mr. Martinez back in clinic, asymptomatic and doing well, now 8 years past myocardial infarction.  Plan on stress test in 1 year's time or sooner followup if necessary.  No changes are made in today's medical regimen.         HANSEL ALVAREZ MD [facility]             D: 2019   T: 2019   MT: VIKA      Name:     ACOSTA MARTINEZ   MRN:      [id number]        Account:      [id number]   :      1950           Service Date: 2019      Document: [id number]

## 2019-05-29 NOTE — PROGRESS NOTES
HPI and Plan:   See dictation    No orders of the defined types were placed in this encounter.    Orders Placed This Encounter   Medications     rosuvastatin (CRESTOR) 40 MG tablet     Sig: Take 1 tablet (40 mg) by mouth daily     Dispense:  90 tablet     Refill:  11     prasugrel (EFFIENT) 10 MG TABS tablet     Sig: Take 1 tablet (10 mg) by mouth daily     Dispense:  30 tablet     Refill:  11     metoprolol succinate ER (TOPROL-XL) 25 MG 24 hr tablet     Sig: Take 1 tablet (25 mg) by mouth daily     Dispense:  90 tablet     Refill:  11     lisinopril (PRINIVIL/ZESTRIL) 20 MG tablet     Sig: Take 1 tablet (20 mg) by mouth daily     Dispense:  90 tablet     Refill:  11     ezetimibe (ZETIA) 10 MG tablet     Sig: Take 1 tablet (10 mg) by mouth every evening     Dispense:  90 tablet     Refill:  11     Medications Discontinued During This Encounter   Medication Reason     rosuvastatin (CRESTOR) 40 MG tablet Reorder     prasugrel (EFFIENT) 10 MG TABS tablet Reorder     metoprolol succinate ER (TOPROL-XL) 25 MG 24 hr tablet Reorder     lisinopril (PRINIVIL/ZESTRIL) 20 MG tablet Reorder     ezetimibe (ZETIA) 10 MG tablet Reorder         Encounter Diagnoses   Name Primary?     Coronary artery disease involving native coronary artery of native heart without angina pectoris Yes     ST elevation myocardial infarction (STEMI), unspecified artery (H)      Hypercholesterolemia      Ischemic cardiomyopathy      Hyperlipidemia LDL goal <100      Hyperlipidemia LDL goal <70      Coronary artery disease involving native coronary artery of native heart, angina presence unspecified        CURRENT MEDICATIONS:  Current Outpatient Medications   Medication Sig Dispense Refill     aspirin 81 MG tablet Take 1 tablet by mouth daily.       Cholecalciferol (VITAMIN D) 2000 UNITS CAPS Take 2,000 Units by mouth daily       cinnamon 500 MG CAPS Take 500 mg by mouth daily       ezetimibe (ZETIA) 10 MG tablet Take 1 tablet (10 mg) by mouth every  evening 90 tablet 11     lisinopril (PRINIVIL/ZESTRIL) 20 MG tablet Take 1 tablet (20 mg) by mouth daily 90 tablet 11     metoprolol succinate ER (TOPROL-XL) 25 MG 24 hr tablet Take 1 tablet (25 mg) by mouth daily 90 tablet 11     nitroGLYcerin (NITROSTAT) 0.4 MG sublingual tablet Place 1 tablet (0.4 mg) under the tongue every 5 minutes as needed for chest pain 25 tablet 3     prasugrel (EFFIENT) 10 MG TABS tablet Take 1 tablet (10 mg) by mouth daily 30 tablet 11     rosuvastatin (CRESTOR) 40 MG tablet Take 1 tablet (40 mg) by mouth daily 90 tablet 11       ALLERGIES   No Known Allergies    PAST MEDICAL HISTORY:  Past Medical History:   Diagnosis Date     Arthritis      Hyperlipemia      Hypertension      Myocardial infarction (H)     2011 STEMI     Post PTCA     JENNIFER to RCA 2011       PAST SURGICAL HISTORY:  Past Surgical History:   Procedure Laterality Date     HEART CATH, ANGIOPLASTY      JENNIFER RCA 2011, 70% stenosis LAD     ORTHOPEDIC SURGERY         FAMILY HISTORY:  Family History   Problem Relation Age of Onset     Hypertension Brother      Hypertension Father      Cancer - colorectal Father      Cancer Mother         lung     Other - See Comments Brother         heamachromatosis       SOCIAL HISTORY:  Social History     Socioeconomic History     Marital status:      Spouse name: None     Number of children: None     Years of education: None     Highest education level: None   Occupational History     None   Social Needs     Financial resource strain: None     Food insecurity:     Worry: None     Inability: None     Transportation needs:     Medical: None     Non-medical: None   Tobacco Use     Smoking status: Never Smoker     Smokeless tobacco: Never Used   Substance and Sexual Activity     Alcohol use: Yes     Alcohol/week: 0.5 oz     Types: 1 Cans of beer per week     Comment: 1 drink  week     Drug use: No     Sexual activity: None   Lifestyle     Physical activity:     Days per week: None     Minutes  "per session: None     Stress: None   Relationships     Social connections:     Talks on phone: None     Gets together: None     Attends Zoroastrianism service: None     Active member of club or organization: None     Attends meetings of clubs or organizations: None     Relationship status: None     Intimate partner violence:     Fear of current or ex partner: None     Emotionally abused: None     Physically abused: None     Forced sexual activity: None   Other Topics Concern      Service Not Asked     Blood Transfusions Not Asked     Caffeine Concern Yes     Comment: ice tea 16oz     Occupational Exposure Not Asked     Hobby Hazards Not Asked     Sleep Concern Not Asked     Stress Concern Not Asked     Weight Concern Not Asked     Special Diet No     Back Care Not Asked     Exercise Yes     Comment: biking, 10,000 steps daily      Bike Helmet Not Asked     Seat Belt Yes     Self-Exams Not Asked     Parent/sibling w/ CABG, MI or angioplasty before 65F 55M? Not Asked   Social History Narrative     None       Review of Systems:  Skin:  Negative     Eyes:  Positive for glasses  ENT:  Negative    Respiratory:  Negative    Cardiovascular:  Negative    Gastroenterology: Negative    Genitourinary:  Negative    Musculoskeletal:  Negative joint pain  Neurologic:  Negative    Psychiatric:  Negative    Heme/Lymph/Imm:  Negative    Endocrine:  Negative            Physical Exam:  Vitals: /74   Pulse 70   Ht 1.854 m (6' 1\")   Wt 93.4 kg (206 lb)   BMI 27.18 kg/m    Constitutional: cooperative, alert and oriented, well developed, well nourished, in no acute distress   Skin: warm and dry to the touch, no apparent skin lesions or masses noted   Head: normocephalic, no masses or lesions   Eyes: pupils equal and round, conjunctivae and lids unremarkable, sclera white, no xanthalasma, EOMS intact, no nystagmus   ENT: no pallor or cyanosis, dentition good   Neck: carotid pulses are full and equal bilaterally, JVP normal, no " carotid bruit, no thyromegaly   Chest: normal breath sounds, clear to auscultation, normal A-P diameter, normal symmetry, normal respiratory excursion, no use of accessory muscles   Cardiac: regular rhythm, normal S1/S2, no S3 or S4, apical impulse not displaced, no murmurs, gallops or rubs no presence of murmur   Abdomen: abdomen soft, non-tender, BS normoactive, no mass, no HSM, no bruits   Vascular: pulses full and equal, no bruits auscultated   Extremities and Back: no deformities, clubbing, cyanosis, erythema observed   Neurological: affect appropriate, oriented to time, person and place     Recent Lab Results:  LIPID RESULTS:  Lab Results   Component Value Date    CHOL 147 05/29/2019    HDL 49 05/29/2019    LDL 71 05/29/2019    TRIG 136 05/29/2019    CHOLHDLRATIO 2.7 03/27/2015       LIVER ENZYME RESULTS:  Lab Results   Component Value Date    AST 28 10/21/2014    ALT 16 05/29/2019       CBC RESULTS:  Lab Results   Component Value Date    WBC 12.5 (H) 12/20/2011    RBC 4.79 12/20/2011    HGB 16.1 10/21/2014    HCT 44.3 12/20/2011    MCV 93 12/20/2011    MCH 32.4 12/20/2011    MCHC 35.0 12/20/2011    RDW 12.2 12/20/2011     12/20/2011       BMP RESULTS:  Lab Results   Component Value Date     10/21/2014    POTASSIUM 4.4 10/21/2014    CHLORIDE 104 10/21/2014    CO2 24 12/20/2011    ANIONGAP 12 10/21/2014    GLC 89 10/21/2014    BUN 20 10/21/2014    CR 1.34 10/21/2014    GFRESTIMATED 85 12/20/2011    GFRESTBLACK >60 10/21/2014    RYANNE 10.2 10/21/2014        A1C RESULTS:  No results found for: A1C    INR RESULTS:  Lab Results   Component Value Date    INR 0.85 (L) 12/19/2011           CC  Gustavo Neumann MD  [facility] [address]  [address] [place] [address]

## 2019-05-29 NOTE — LETTER
5/29/2019    Gustavo Neumann MD  [facility] [address]  [place] [address]    RE: Bridger Castillo       Dear Colleague,    I had the pleasure of seeing Bridger Castillo in the [facility].    HPI and Plan:   See dictation    No orders of the defined types were placed in this encounter.    Orders Placed This Encounter   Medications     rosuvastatin (CRESTOR) 40 MG tablet     Sig: Take 1 tablet (40 mg) by mouth daily     Dispense:  90 tablet     Refill:  11     prasugrel (EFFIENT) 10 MG TABS tablet     Sig: Take 1 tablet (10 mg) by mouth daily     Dispense:  30 tablet     Refill:  11     metoprolol succinate ER (TOPROL-XL) 25 MG 24 hr tablet     Sig: Take 1 tablet (25 mg) by mouth daily     Dispense:  90 tablet     Refill:  11     lisinopril (PRINIVIL/ZESTRIL) 20 MG tablet     Sig: Take 1 tablet (20 mg) by mouth daily     Dispense:  90 tablet     Refill:  11     ezetimibe (ZETIA) 10 MG tablet     Sig: Take 1 tablet (10 mg) by mouth every evening     Dispense:  90 tablet     Refill:  11     Medications Discontinued During This Encounter   Medication Reason     rosuvastatin (CRESTOR) 40 MG tablet Reorder     prasugrel (EFFIENT) 10 MG TABS tablet Reorder     metoprolol succinate ER (TOPROL-XL) 25 MG 24 hr tablet Reorder     lisinopril (PRINIVIL/ZESTRIL) 20 MG tablet Reorder     ezetimibe (ZETIA) 10 MG tablet Reorder         Encounter Diagnoses   Name Primary?     Coronary artery disease involving native coronary artery of native heart without angina pectoris Yes     ST elevation myocardial infarction (STEMI), unspecified artery (H)      Hypercholesterolemia      Ischemic cardiomyopathy      Hyperlipidemia LDL goal <100      Hyperlipidemia LDL goal <70      Coronary artery disease involving native coronary artery of native heart, angina presence unspecified        CURRENT MEDICATIONS:  Current Outpatient Medications   Medication Sig Dispense Refill     aspirin 81 MG tablet Take 1  tablet by mouth daily.       Cholecalciferol (VITAMIN D) 2000 UNITS CAPS Take 2,000 Units by mouth daily       cinnamon 500 MG CAPS Take 500 mg by mouth daily       ezetimibe (ZETIA) 10 MG tablet Take 1 tablet (10 mg) by mouth every evening 90 tablet 11     lisinopril (PRINIVIL/ZESTRIL) 20 MG tablet Take 1 tablet (20 mg) by mouth daily 90 tablet 11     metoprolol succinate ER (TOPROL-XL) 25 MG 24 hr tablet Take 1 tablet (25 mg) by mouth daily 90 tablet 11     nitroGLYcerin (NITROSTAT) 0.4 MG sublingual tablet Place 1 tablet (0.4 mg) under the tongue every 5 minutes as needed for chest pain 25 tablet 3     prasugrel (EFFIENT) 10 MG TABS tablet Take 1 tablet (10 mg) by mouth daily 30 tablet 11     rosuvastatin (CRESTOR) 40 MG tablet Take 1 tablet (40 mg) by mouth daily 90 tablet 11       ALLERGIES   No Known Allergies    PAST MEDICAL HISTORY:  Past Medical History:   Diagnosis Date     Arthritis      Hyperlipemia      Hypertension      Myocardial infarction (H)     2011 STEMI     Post PTCA     JENNIFER to RCA 2011       PAST SURGICAL HISTORY:  Past Surgical History:   Procedure Laterality Date     HEART CATH, ANGIOPLASTY      JENNIFER RCA 2011, 70% stenosis LAD     ORTHOPEDIC SURGERY         FAMILY HISTORY:  Family History   Problem Relation Age of Onset     Hypertension Brother      Hypertension Father      Cancer - colorectal Father      Cancer Mother         lung     Other - See Comments Brother         heamachromatosis       SOCIAL HISTORY:  Social History     Socioeconomic History     Marital status:      Spouse name: None     Number of children: None     Years of education: None     Highest education level: None   Occupational History     None   Social Needs     Financial resource strain: None     Food insecurity:     Worry: None     Inability: None     Transportation needs:     Medical: None     Non-medical: None   Tobacco Use     Smoking status: Never Smoker     Smokeless tobacco: Never Used   Substance and  "Sexual Activity     Alcohol use: Yes     Alcohol/week: 0.5 oz     Types: 1 Cans of beer per week     Comment: 1 drink  week     Drug use: No     Sexual activity: None   Lifestyle     Physical activity:     Days per week: None     Minutes per session: None     Stress: None   Relationships     Social connections:     Talks on phone: None     Gets together: None     Attends Mandaen service: None     Active member of club or organization: None     Attends meetings of clubs or organizations: None     Relationship status: None     Intimate partner violence:     Fear of current or ex partner: None     Emotionally abused: None     Physically abused: None     Forced sexual activity: None   Other Topics Concern      Service Not Asked     Blood Transfusions Not Asked     Caffeine Concern Yes     Comment: ice tea 16oz     Occupational Exposure Not Asked     Hobby Hazards Not Asked     Sleep Concern Not Asked     Stress Concern Not Asked     Weight Concern Not Asked     Special Diet No     Back Care Not Asked     Exercise Yes     Comment: biking, 10,000 steps daily      Bike Helmet Not Asked     Seat Belt Yes     Self-Exams Not Asked     Parent/sibling w/ CABG, MI or angioplasty before 65F 55M? Not Asked   Social History Narrative     None       Review of Systems:  Skin:  Negative     Eyes:  Positive for glasses  ENT:  Negative    Respiratory:  Negative    Cardiovascular:  Negative    Gastroenterology: Negative    Genitourinary:  Negative    Musculoskeletal:  Negative joint pain  Neurologic:  Negative    Psychiatric:  Negative    Heme/Lymph/Imm:  Negative    Endocrine:  Negative            Physical Exam:  Vitals: /74   Pulse 70   Ht 1.854 m (6' 1\")   Wt 93.4 kg (206 lb)   BMI 27.18 kg/m     Constitutional: cooperative, alert and oriented, well developed, well nourished, in no acute distress   Skin: warm and dry to the touch, no apparent skin lesions or masses noted   Head: normocephalic, no masses or lesions "   Eyes: pupils equal and round, conjunctivae and lids unremarkable, sclera white, no xanthalasma, EOMS intact, no nystagmus   ENT: no pallor or cyanosis, dentition good   Neck: carotid pulses are full and equal bilaterally, JVP normal, no carotid bruit, no thyromegaly   Chest: normal breath sounds, clear to auscultation, normal A-P diameter, normal symmetry, normal respiratory excursion, no use of accessory muscles   Cardiac: regular rhythm, normal S1/S2, no S3 or S4, apical impulse not displaced, no murmurs, gallops or rubs no presence of murmur   Abdomen: abdomen soft, non-tender, BS normoactive, no mass, no HSM, no bruits   Vascular: pulses full and equal, no bruits auscultated   Extremities and Back: no deformities, clubbing, cyanosis, erythema observed   Neurological: affect appropriate, oriented to time, person and place     Recent Lab Results:  LIPID RESULTS:  Lab Results   Component Value Date    CHOL 147 05/29/2019    HDL 49 05/29/2019    LDL 71 05/29/2019    TRIG 136 05/29/2019    CHOLHDLRATIO 2.7 03/27/2015       LIVER ENZYME RESULTS:  Lab Results   Component Value Date    AST 28 10/21/2014    ALT 16 05/29/2019       CBC RESULTS:  Lab Results   Component Value Date    WBC 12.5 (H) 12/20/2011    RBC 4.79 12/20/2011    HGB 16.1 10/21/2014    HCT 44.3 12/20/2011    MCV 93 12/20/2011    MCH 32.4 12/20/2011    MCHC 35.0 12/20/2011    RDW 12.2 12/20/2011     12/20/2011       BMP RESULTS:  Lab Results   Component Value Date     10/21/2014    POTASSIUM 4.4 10/21/2014    CHLORIDE 104 10/21/2014    CO2 24 12/20/2011    ANIONGAP 12 10/21/2014    GLC 89 10/21/2014    BUN 20 10/21/2014    CR 1.34 10/21/2014    GFRESTIMATED 85 12/20/2011    GFRESTBLACK >60 10/21/2014    RYANNE 10.2 10/21/2014        A1C RESULTS:  No results found for: A1C    INR RESULTS:  Lab Results   Component Value Date    INR 0.85 (L) 12/19/2011           CC  Gustavo Neumann MD  [facility]  [address] [place]  [address]                    Thank you for allowing me to participate in the care of your patient.      Sincerely,     HANSEL ALVAREZ MD     [facility]    cc:   Gustavo Neumann MD  [place] [address] [place] [address]

## 2019-08-21 DIAGNOSIS — E78.5 HYPERLIPIDEMIA LDL GOAL <70: ICD-10-CM

## 2019-08-21 DIAGNOSIS — I25.10 CORONARY ARTERY DISEASE INVOLVING NATIVE CORONARY ARTERY OF NATIVE HEART, ANGINA PRESENCE UNSPECIFIED: ICD-10-CM

## 2019-08-21 DIAGNOSIS — I21.3 ST ELEVATION MYOCARDIAL INFARCTION (STEMI), UNSPECIFIED ARTERY (H): ICD-10-CM

## 2019-08-21 DIAGNOSIS — I10 BENIGN HYPERTENSION: ICD-10-CM

## 2019-08-21 DIAGNOSIS — E78.00 HYPERCHOLESTEROLEMIA: ICD-10-CM

## 2019-08-21 DIAGNOSIS — I25.10 CORONARY ARTERY DISEASE INVOLVING NATIVE CORONARY ARTERY OF NATIVE HEART WITHOUT ANGINA PECTORIS: ICD-10-CM

## 2019-08-21 DIAGNOSIS — I25.5 ISCHEMIC CARDIOMYOPATHY: ICD-10-CM

## 2019-08-21 DIAGNOSIS — I21.29 ST ELEVATION MYOCARDIAL INFARCTION (STEMI) INVOLVING OTHER CORONARY ARTERY (H): ICD-10-CM

## 2019-08-21 RX ORDER — NITROGLYCERIN 0.4 MG/1
0.4 TABLET SUBLINGUAL EVERY 5 MIN PRN
Qty: 25 TABLET | Refills: 3 | Status: SHIPPED | OUTPATIENT
Start: 2019-08-21 | End: 2020-07-06

## 2020-02-16 ENCOUNTER — HEALTH MAINTENANCE LETTER (OUTPATIENT)
Age: 70
End: 2020-02-16

## 2020-05-11 ENCOUNTER — TELEPHONE (OUTPATIENT)
Dept: CARDIOLOGY | Facility: CLINIC | Age: 70
End: 2020-05-11

## 2020-05-12 NOTE — TELEPHONE ENCOUNTER
Spoke with patient regarding orders for stress echo. Scheduled for tomorrow 5/13/20, prep reviewed with patient. He will hold his metoprolol. Rosa Pedroza RN      Wellness Screening Tool  Symptom Screening:  Do you have one of the following new symptoms:    Fever or reported chills?  No    A new cough (started within the past 14 days)? No    Shortness of breath (started within the past 14 days)? No    Nausea, vomiting or diarrhea? No  Within the past 3 weeks, have you been exposed to someone with a known positive illness below?    COVID - 19 (known or suspected) No    Chicken pox?  No    Measles? No    Pertussis? No  Patient notified of visitor restriction: Yes  Patient informed to wear a mask: Yes  Patient's appointment status: Patient will be seen in clinic for echo as scheduled on 5/13/20.  Rosa Pedroza RN

## 2020-05-13 ENCOUNTER — HOSPITAL ENCOUNTER (OUTPATIENT)
Dept: CARDIOLOGY | Facility: CLINIC | Age: 70
Discharge: HOME | End: 2020-05-13
Attending: INTERNAL MEDICINE | Admitting: INTERNAL MEDICINE
Payer: COMMERCIAL

## 2020-05-13 DIAGNOSIS — I25.10 CORONARY ARTERY DISEASE INVOLVING NATIVE CORONARY ARTERY OF NATIVE HEART WITHOUT ANGINA PECTORIS: ICD-10-CM

## 2020-05-13 DIAGNOSIS — E78.00 HYPERCHOLESTEROLEMIA: ICD-10-CM

## 2020-05-13 DIAGNOSIS — E78.5 HYPERLIPIDEMIA LDL GOAL <70: ICD-10-CM

## 2020-05-13 DIAGNOSIS — I25.5 ISCHEMIC CARDIOMYOPATHY: ICD-10-CM

## 2020-05-13 DIAGNOSIS — I21.3 ST ELEVATION MYOCARDIAL INFARCTION (STEMI), UNSPECIFIED ARTERY (H): ICD-10-CM

## 2020-05-13 DIAGNOSIS — I25.10 CORONARY ARTERY DISEASE INVOLVING NATIVE CORONARY ARTERY OF NATIVE HEART, ANGINA PRESENCE UNSPECIFIED: ICD-10-CM

## 2020-05-13 DIAGNOSIS — E78.5 HYPERLIPIDEMIA LDL GOAL <100: ICD-10-CM

## 2020-05-13 PROCEDURE — 93321 DOPPLER ECHO F-UP/LMTD STD: CPT | Mod: TC

## 2020-05-13 PROCEDURE — 25500064 ZZH RX 255 OP 636: Performed by: INTERNAL MEDICINE

## 2020-05-13 PROCEDURE — 93350 STRESS TTE ONLY: CPT | Mod: 26 | Performed by: INTERNAL MEDICINE

## 2020-05-13 PROCEDURE — 93016 CV STRESS TEST SUPVJ ONLY: CPT | Performed by: INTERNAL MEDICINE

## 2020-05-13 PROCEDURE — 93321 DOPPLER ECHO F-UP/LMTD STD: CPT | Mod: 26 | Performed by: INTERNAL MEDICINE

## 2020-05-13 PROCEDURE — 93325 DOPPLER ECHO COLOR FLOW MAPG: CPT | Mod: 26 | Performed by: INTERNAL MEDICINE

## 2020-05-13 PROCEDURE — 93018 CV STRESS TEST I&R ONLY: CPT | Performed by: INTERNAL MEDICINE

## 2020-05-13 RX ADMIN — HUMAN ALBUMIN MICROSPHERES AND PERFLUTREN 6 ML: 10; .22 INJECTION, SOLUTION INTRAVENOUS at 10:30

## 2020-05-14 ENCOUNTER — VIRTUAL VISIT (OUTPATIENT)
Dept: CARDIOLOGY | Facility: CLINIC | Age: 70
End: 2020-05-14
Attending: INTERNAL MEDICINE
Payer: COMMERCIAL

## 2020-05-14 ENCOUNTER — TELEPHONE (OUTPATIENT)
Dept: CARDIOLOGY | Facility: CLINIC | Age: 70
End: 2020-05-14

## 2020-05-14 DIAGNOSIS — E78.5 HYPERLIPIDEMIA LDL GOAL <70: ICD-10-CM

## 2020-05-14 DIAGNOSIS — I25.5 ISCHEMIC CARDIOMYOPATHY: ICD-10-CM

## 2020-05-14 DIAGNOSIS — I25.10 CORONARY ARTERY DISEASE INVOLVING NATIVE CORONARY ARTERY OF NATIVE HEART WITHOUT ANGINA PECTORIS: Primary | ICD-10-CM

## 2020-05-14 DIAGNOSIS — E78.00 HYPERCHOLESTEROLEMIA: ICD-10-CM

## 2020-05-14 DIAGNOSIS — I21.3 ST ELEVATION MYOCARDIAL INFARCTION (STEMI), UNSPECIFIED ARTERY (H): ICD-10-CM

## 2020-05-14 DIAGNOSIS — I25.10 CORONARY ARTERY DISEASE INVOLVING NATIVE CORONARY ARTERY OF NATIVE HEART, ANGINA PRESENCE UNSPECIFIED: ICD-10-CM

## 2020-05-14 DIAGNOSIS — E78.5 HYPERLIPIDEMIA LDL GOAL <100: ICD-10-CM

## 2020-05-14 PROCEDURE — 99213 OFFICE O/P EST LOW 20 MIN: CPT | Mod: 95 | Performed by: INTERNAL MEDICINE

## 2020-05-14 RX ORDER — LIDOCAINE 40 MG/G
CREAM TOPICAL
Status: CANCELLED | OUTPATIENT
Start: 2020-05-14

## 2020-05-14 RX ORDER — SODIUM CHLORIDE 9 MG/ML
INJECTION, SOLUTION INTRAVENOUS CONTINUOUS
Status: CANCELLED | OUTPATIENT
Start: 2020-05-14

## 2020-05-14 RX ORDER — POTASSIUM CHLORIDE 1500 MG/1
20 TABLET, EXTENDED RELEASE ORAL
Status: CANCELLED | OUTPATIENT
Start: 2020-05-14

## 2020-05-14 NOTE — PROGRESS NOTES
"Bridger Castillo is a 69 year old male who is being evaluated via a billable video visit.      The patient has been notified of following:     \"This video visit will be conducted via a call between you and your physician/provider. We have found that certain health care needs can be provided without the need for an in-person physical exam.  This service lets us provide the care you need with a video conversation.  If a prescription is necessary we can send it directly to your pharmacy.  If lab work is needed we can place an order for that and you can then stop by our lab to have the test done at a later time.    Video visits are billed at different rates depending on your insurance coverage.  Please reach out to your insurance provider with any questions.    If during the course of the call the physician/provider feels a video visit is not appropriate, you will not be charged for this service.\"    Patient has given verbal consent for Video visit? Yes    How would you like to obtain your AVS? Brit    Patient would like the video invitation sent by: Text to cell phone: [phone]    Will anyone else be joining your video visit? No  VITALS REPORTED BY PATIENT:  Weight- 193 lbs  BP- 120/76 @ physical in Dec.  Pulse- 64    Review Of Systems:  Skin: NEGATIVE  Eyes:Ears/Nose/Throat: NEGATIVE  Respiratory: NEGATIVE  Cardiovascular:NEGATIVE  Gastrointestinal: NEGATIVE  Genitourinary:NEGATIVE  Musculoskeletal: Occasional muscle cramps  Neurologic: NEGATIVE  Psychiatric: NEGATIVE  Hematologic/Lymphatic/Immunologic: NEGATIVE  Endocrine:  NEGATIVE    PEARL Johnson    Video-Visit Details    Type of service:  Video Visit    Video Start Time: 7:52  Video End Time: 8:10    Originating Location (pt. Location): Home    Distant Location (provider location):  [facility]     Platform used for Video Visit: Doximity    Mr. Bridger Castillo and I had a video visit today to his home.  He was overall " feeling well.  He was resting comfortably on the couch with his 2 dogs.  He denied any chest pain chest pressure shortness of breath heart racing palpitations edema or syncope.  We reviewed his stress echocardiogram which was done just recently.  This was abnormal with ST segment depression on EKG exercise time of 5 minutes and 15 seconds which is decreased from 2 years ago over 7 minutes.  In addition he had new area of anterior ischemia and hypokinesis consistent with anterior ischemia.  The extent of the ischemia was not described.    He has been compliant with his medications and otherwise is been well.  He denies any fever chills cough melena.  I did discuss with him that I feel because of this new anterior ischemia he needs a coronary angiogram and possible coronary intervention.  On his 2011 angiogram we reviewed that he a had at that time in the LAD 50 to 60% stenosis and most likely this is progressed given a stress test was normal just 2 years ago correction in 2017.    Therefore I will delayed the plan for a coronary angiogram.  I would like this to be done within the next 2 weeks.  This may be done either femoral a or radially.  He has no contrast allergies.  I outlined that if we found a significant blockage she we very well may perform coronary stenting.  The risk and benefits of the procedure was explained to the patient.    Physical exam patient was resting comfortably in no acute distress.    Head was atraumatic normocephalic    Pupils eyes had no discharge and reactive    Neck was supple    Respiratory was unlabored respirations and no wheezing    Musculoskeletal had full range of motion.    Extremities without edema.    Neurology full range of motion normal sensitivity no tremors.    Psychiatric without anxiety or depression.  Alert and orientated.    HANSEL ALVAREZ MD

## 2020-05-14 NOTE — LETTER
5/14/2020      RE: Bridger Castillo  [address]  [facility] [address]       Dear Colleague,    Thank you for the opportunity to participate in the care of your patient, Bridger Castillo, at the [facility]. Please see a copy of my visit note below.    Mr. Bridger Castillo and I had a video visit today to his home.  He was overall feeling well.  He was resting comfortably on the couch with his 2 dogs.  He denied any chest pain chest pressure shortness of breath heart racing palpitations edema or syncope.  We reviewed his stress echocardiogram which was done just recently.  This was abnormal with ST segment depression on EKG exercise time of 5 minutes and 15 seconds which is decreased from 2 years ago over 7 minutes.  In addition he had new area of anterior ischemia and hypokinesis consistent with anterior ischemia.  The extent of the ischemia was not described.    He has been compliant with his medications and otherwise is been well.  He denies any fever chills cough melena.  I did discuss with him that I feel because of this new anterior ischemia he needs a coronary angiogram and possible coronary intervention.  On his 2011 angiogram we reviewed that he a had at that time in the LAD 50 to 60% stenosis and most likely this is progressed given a stress test was normal just 2 years ago correction in 2017.    Therefore I will delayed the plan for a coronary angiogram.  I would like this to be done within the next 2 weeks.  This may be done either femoral a or radially.  He has no contrast allergies.  I outlined that if we found a significant blockage she we very well may perform coronary stenting.  The risk and benefits of the procedure was explained to the patient.    Physical exam patient was resting comfortably in no acute distress.    Head was atraumatic normocephalic    Pupils eyes had no discharge and reactive    Neck  was supple    Respiratory was unlabored respirations and no wheezing    Musculoskeletal had full range of motion.    Extremities without edema.    Neurology full range of motion normal sensitivity no tremors.    Psychiatric without anxiety or depression.  Alert and orientated.    Please do not hesitate to contact me if you have any questions/concerns.     Sincerely,     HANSEL ALVAREZ MD

## 2020-05-14 NOTE — TELEPHONE ENCOUNTER
Patient is scheduled for a heart cath at [facility] on 5/15/20. Check in time is at 0900am and procedure time is at 1100am.  Patient to be NPO after midnight on 5/15/20.   Patient is not on insulin or on any other diabetic medications. Patient is not on an anticoagulant. Patient is on aspirin and will continue 81mg daily. Patient can take other meds prior to procedure with small sips of water.  Patient denies any contrast allergy.  Patient will have someone available to drive to the hospital, to drive patient home, and have someone available for 24 hours post-procedure.     Wellness Screening Tool  Symptom Screening:  Do you have one of the following new symptoms:    Fever or reported chills?  No    A new cough (started within the past 14 days)? No    Shortness of breath (started within the past 14 days)?  No    Nausea, vomiting or diarrhea? No  Within the past 3 weeks, have you been exposed to someone with a known positive illness below?    COVID - 19 (known or suspected) No    Chicken pox?  No    Measles? No    Pertussis? No  Patient notified of visitor restriction: Yes  Patient informed to wear a mask: Yes  Patient's appointment status: Patient will proceed with angiogram as scheduled on 5/15/20.    Rosa Pedroza RN  [facility]

## 2020-05-15 ENCOUNTER — SURGERY (OUTPATIENT)
Age: 70
End: 2020-05-15
Payer: COMMERCIAL

## 2020-05-15 ENCOUNTER — HOSPITAL ENCOUNTER (OUTPATIENT)
Facility: CLINIC | Age: 70
Discharge: HOME | End: 2020-05-15
Admitting: INTERNAL MEDICINE
Payer: COMMERCIAL

## 2020-05-15 VITALS
RESPIRATION RATE: 16 BRPM | HEART RATE: 80 BPM | HEIGHT: 73 IN | DIASTOLIC BLOOD PRESSURE: 59 MMHG | TEMPERATURE: 98.1 F | SYSTOLIC BLOOD PRESSURE: 100 MMHG | BODY MASS INDEX: 25.88 KG/M2 | WEIGHT: 195.3 LBS | OXYGEN SATURATION: 93 %

## 2020-05-15 DIAGNOSIS — I25.10 CORONARY ARTERY DISEASE INVOLVING NATIVE CORONARY ARTERY OF NATIVE HEART WITHOUT ANGINA PECTORIS: ICD-10-CM

## 2020-05-15 DIAGNOSIS — I21.3 ST ELEVATION MYOCARDIAL INFARCTION (STEMI), UNSPECIFIED ARTERY (H): ICD-10-CM

## 2020-05-15 DIAGNOSIS — I25.5 ISCHEMIC CARDIOMYOPATHY: ICD-10-CM

## 2020-05-15 DIAGNOSIS — I25.10 CORONARY ARTERY DISEASE INVOLVING NATIVE CORONARY ARTERY OF NATIVE HEART, ANGINA PRESENCE UNSPECIFIED: ICD-10-CM

## 2020-05-15 DIAGNOSIS — E78.5 HYPERLIPIDEMIA LDL GOAL <70: ICD-10-CM

## 2020-05-15 DIAGNOSIS — E78.00 HYPERCHOLESTEROLEMIA: ICD-10-CM

## 2020-05-15 DIAGNOSIS — E78.5 HYPERLIPIDEMIA LDL GOAL <100: ICD-10-CM

## 2020-05-15 PROBLEM — Z98.890 STATUS POST CORONARY ANGIOGRAM: Status: ACTIVE | Noted: 2020-05-15

## 2020-05-15 LAB
ANION GAP SERPL CALCULATED.3IONS-SCNC: 6 MMOL/L (ref 3–14)
APTT PPP: 31 SEC (ref 22–37)
BUN SERPL-MCNC: 27 MG/DL (ref 7–30)
CALCIUM SERPL-MCNC: 9.6 MG/DL (ref 8.5–10.1)
CHLORIDE SERPL-SCNC: 105 MMOL/L (ref 94–109)
CO2 SERPL-SCNC: 26 MMOL/L (ref 20–32)
CREAT SERPL-MCNC: 1.46 MG/DL (ref 0.66–1.25)
ERYTHROCYTE [DISTWIDTH] IN BLOOD BY AUTOMATED COUNT: 12.3 % (ref 10–15)
GFR SERPL CREATININE-BSD FRML MDRD: 49 ML/MIN/{1.73_M2}
GLUCOSE SERPL-MCNC: 101 MG/DL (ref 70–99)
HCT VFR BLD AUTO: 45.7 % (ref 40–53)
HGB BLD-MCNC: 15.5 G/DL (ref 13.3–17.7)
INR PPP: 1.02 (ref 0.86–1.14)
KCT BLD-ACNC: 312 SEC (ref 75–150)
MCH RBC QN AUTO: 32.8 PG (ref 26.5–33)
MCHC RBC AUTO-ENTMCNC: 33.9 G/DL (ref 31.5–36.5)
MCV RBC AUTO: 97 FL (ref 78–100)
PLATELET # BLD AUTO: 204 10E9/L (ref 150–450)
POTASSIUM SERPL-SCNC: 4.2 MMOL/L (ref 3.4–5.3)
RBC # BLD AUTO: 4.72 10E12/L (ref 4.4–5.9)
SODIUM SERPL-SCNC: 137 MMOL/L (ref 133–144)
WBC # BLD AUTO: 6.1 10E9/L (ref 4–11)

## 2020-05-15 PROCEDURE — 40000065 ZZH STATISTIC EKG NON-CHARGEABLE

## 2020-05-15 PROCEDURE — 25000128 H RX IP 250 OP 636: Performed by: INTERNAL MEDICINE

## 2020-05-15 PROCEDURE — 25000132 ZZH RX MED GY IP 250 OP 250 PS 637: Performed by: INTERNAL MEDICINE

## 2020-05-15 PROCEDURE — 93454 CORONARY ARTERY ANGIO S&I: CPT | Mod: 26 | Performed by: INTERNAL MEDICINE

## 2020-05-15 PROCEDURE — 99152 MOD SED SAME PHYS/QHP 5/>YRS: CPT | Mod: 59 | Performed by: INTERNAL MEDICINE

## 2020-05-15 PROCEDURE — 93454 CORONARY ARTERY ANGIO S&I: CPT | Mod: XU | Performed by: INTERNAL MEDICINE

## 2020-05-15 PROCEDURE — 99152 MOD SED SAME PHYS/QHP 5/>YRS: CPT | Performed by: INTERNAL MEDICINE

## 2020-05-15 PROCEDURE — 93010 ELECTROCARDIOGRAM REPORT: CPT | Mod: 76 | Performed by: INTERNAL MEDICINE

## 2020-05-15 PROCEDURE — C1725 CATH, TRANSLUMIN NON-LASER: HCPCS | Performed by: INTERNAL MEDICINE

## 2020-05-15 PROCEDURE — 27210794 ZZH OR GENERAL SUPPLY STERILE: Performed by: INTERNAL MEDICINE

## 2020-05-15 PROCEDURE — 93005 ELECTROCARDIOGRAM TRACING: CPT

## 2020-05-15 PROCEDURE — 36415 COLL VENOUS BLD VENIPUNCTURE: CPT

## 2020-05-15 PROCEDURE — 80048 BASIC METABOLIC PNL TOTAL CA: CPT | Performed by: INTERNAL MEDICINE

## 2020-05-15 PROCEDURE — 85027 COMPLETE CBC AUTOMATED: CPT | Performed by: INTERNAL MEDICINE

## 2020-05-15 PROCEDURE — 99153 MOD SED SAME PHYS/QHP EA: CPT | Performed by: INTERNAL MEDICINE

## 2020-05-15 PROCEDURE — C1769 GUIDE WIRE: HCPCS | Performed by: INTERNAL MEDICINE

## 2020-05-15 PROCEDURE — C1887 CATHETER, GUIDING: HCPCS | Performed by: INTERNAL MEDICINE

## 2020-05-15 PROCEDURE — 85730 THROMBOPLASTIN TIME PARTIAL: CPT | Performed by: INTERNAL MEDICINE

## 2020-05-15 PROCEDURE — 85610 PROTHROMBIN TIME: CPT | Performed by: INTERNAL MEDICINE

## 2020-05-15 PROCEDURE — 25800030 ZZH RX IP 258 OP 636: Performed by: INTERNAL MEDICINE

## 2020-05-15 PROCEDURE — 40000235 ZZH STATISTIC TELEMETRY

## 2020-05-15 PROCEDURE — C1874 STENT, COATED/COV W/DEL SYS: HCPCS | Performed by: INTERNAL MEDICINE

## 2020-05-15 PROCEDURE — 92928 PRQ TCAT PLMT NTRAC ST 1 LES: CPT | Mod: LD | Performed by: INTERNAL MEDICINE

## 2020-05-15 PROCEDURE — C1760 CLOSURE DEV, VASC: HCPCS | Performed by: INTERNAL MEDICINE

## 2020-05-15 PROCEDURE — 85347 COAGULATION TIME ACTIVATED: CPT

## 2020-05-15 PROCEDURE — 40000852 ZZH STATISTIC HEART CATH LAB OR EP LAB

## 2020-05-15 PROCEDURE — C9600 PERC DRUG-EL COR STENT SING: HCPCS | Mod: LD | Performed by: INTERNAL MEDICINE

## 2020-05-15 DEVICE — STENT RESOLUTE ONYX DE 2.7FR 3.00X38MM RONYX DE30038UX: Type: IMPLANTABLE DEVICE | Status: FUNCTIONAL

## 2020-05-15 RX ORDER — SODIUM CHLORIDE 9 MG/ML
INJECTION, SOLUTION INTRAVENOUS CONTINUOUS
Status: DISCONTINUED | OUTPATIENT
Start: 2020-05-15 | End: 2020-05-15 | Stop reason: HOSPADM

## 2020-05-15 RX ORDER — NALOXONE HYDROCHLORIDE 0.4 MG/ML
.1-.4 INJECTION, SOLUTION INTRAMUSCULAR; INTRAVENOUS; SUBCUTANEOUS
Status: DISCONTINUED | OUTPATIENT
Start: 2020-05-15 | End: 2020-05-15 | Stop reason: HOSPADM

## 2020-05-15 RX ORDER — ATROPINE SULFATE 0.1 MG/ML
0.5 INJECTION INTRAVENOUS EVERY 5 MIN PRN
Status: DISCONTINUED | OUTPATIENT
Start: 2020-05-15 | End: 2020-05-15 | Stop reason: HOSPADM

## 2020-05-15 RX ORDER — NITROGLYCERIN 5 MG/ML
VIAL (ML) INTRAVENOUS
Status: DISCONTINUED | OUTPATIENT
Start: 2020-05-15 | End: 2020-05-15 | Stop reason: HOSPADM

## 2020-05-15 RX ORDER — FENTANYL CITRATE 50 UG/ML
25-50 INJECTION, SOLUTION INTRAMUSCULAR; INTRAVENOUS
Status: DISCONTINUED | OUTPATIENT
Start: 2020-05-15 | End: 2020-05-15 | Stop reason: HOSPADM

## 2020-05-15 RX ORDER — ASPIRIN 81 MG/1
81 TABLET ORAL DAILY
Status: DISCONTINUED | OUTPATIENT
Start: 2020-05-15 | End: 2020-05-15 | Stop reason: HOSPADM

## 2020-05-15 RX ORDER — NALOXONE HYDROCHLORIDE 0.4 MG/ML
.2-.4 INJECTION, SOLUTION INTRAMUSCULAR; INTRAVENOUS; SUBCUTANEOUS
Status: DISCONTINUED | OUTPATIENT
Start: 2020-05-15 | End: 2020-05-15 | Stop reason: HOSPADM

## 2020-05-15 RX ORDER — IOPAMIDOL 755 MG/ML
INJECTION, SOLUTION INTRAVASCULAR
Status: DISCONTINUED | OUTPATIENT
Start: 2020-05-15 | End: 2020-05-15 | Stop reason: HOSPADM

## 2020-05-15 RX ORDER — ACETAMINOPHEN 325 MG/1
650 TABLET ORAL EVERY 4 HOURS PRN
Status: DISCONTINUED | OUTPATIENT
Start: 2020-05-15 | End: 2020-05-15 | Stop reason: HOSPADM

## 2020-05-15 RX ORDER — LIDOCAINE 40 MG/G
CREAM TOPICAL
Status: DISCONTINUED | OUTPATIENT
Start: 2020-05-15 | End: 2020-05-15 | Stop reason: HOSPADM

## 2020-05-15 RX ORDER — FLUMAZENIL 0.1 MG/ML
0.2 INJECTION, SOLUTION INTRAVENOUS
Status: DISCONTINUED | OUTPATIENT
Start: 2020-05-15 | End: 2020-05-15 | Stop reason: HOSPADM

## 2020-05-15 RX ORDER — FENTANYL CITRATE 50 UG/ML
INJECTION, SOLUTION INTRAMUSCULAR; INTRAVENOUS
Status: DISCONTINUED | OUTPATIENT
Start: 2020-05-15 | End: 2020-05-15 | Stop reason: HOSPADM

## 2020-05-15 RX ORDER — POTASSIUM CHLORIDE 1500 MG/1
20 TABLET, EXTENDED RELEASE ORAL
Status: DISCONTINUED | OUTPATIENT
Start: 2020-05-15 | End: 2020-05-15 | Stop reason: HOSPADM

## 2020-05-15 RX ORDER — NITROGLYCERIN 0.4 MG/1
0.4 TABLET SUBLINGUAL EVERY 5 MIN PRN
Status: DISCONTINUED | OUTPATIENT
Start: 2020-05-15 | End: 2020-05-15 | Stop reason: HOSPADM

## 2020-05-15 RX ORDER — HEPARIN SODIUM 1000 [USP'U]/ML
INJECTION, SOLUTION INTRAVENOUS; SUBCUTANEOUS
Status: DISCONTINUED | OUTPATIENT
Start: 2020-05-15 | End: 2020-05-15 | Stop reason: HOSPADM

## 2020-05-15 RX ADMIN — NITROGLYCERIN 300 MCG: 5 INJECTION, SOLUTION INTRAVENOUS at 13:33

## 2020-05-15 RX ADMIN — FENTANYL CITRATE 25 MCG: 50 INJECTION, SOLUTION INTRAMUSCULAR; INTRAVENOUS at 13:15

## 2020-05-15 RX ADMIN — MIDAZOLAM 0.5 MG: 1 INJECTION INTRAMUSCULAR; INTRAVENOUS at 13:15

## 2020-05-15 RX ADMIN — FENTANYL CITRATE 50 MCG: 50 INJECTION, SOLUTION INTRAMUSCULAR; INTRAVENOUS at 13:10

## 2020-05-15 RX ADMIN — HEPARIN SODIUM 9000 UNITS: 1000 INJECTION INTRAVENOUS; SUBCUTANEOUS at 13:23

## 2020-05-15 RX ADMIN — TICAGRELOR 180 MG: 90 TABLET ORAL at 13:23

## 2020-05-15 RX ADMIN — SODIUM CHLORIDE: 9 INJECTION, SOLUTION INTRAVENOUS at 09:32

## 2020-05-15 RX ADMIN — IOPAMIDOL 175 ML: 755 INJECTION, SOLUTION INTRAVENOUS at 13:53

## 2020-05-15 RX ADMIN — MIDAZOLAM 1 MG: 1 INJECTION INTRAMUSCULAR; INTRAVENOUS at 13:10

## 2020-05-15 ASSESSMENT — MIFFLIN-ST. JEOR: SCORE: 1714.75

## 2020-05-15 NOTE — DISCHARGE INSTRUCTIONS
Cardiac Stent Discharge Instructions - Femoral    After you go home:      Have an adult stay with you until tomorrow.    Drink extra fluids for 2 days.    You may resume your normal diet.    No smoking       For 24 hours - due to the sedation you received:    Relax and take it easy.    Do NOT make any important or legal decisions.    Do NOT drive or operate machines at home or at work.    Do NOT drink alcohol.    Care of Groin Puncture Site:      For the first 24 hrs - check the puncture site every 1-2 hours while awake.    For 2 days, when you cough, sneeze, laugh or move your bowels, hold your hand over the puncture site and press firmly.    Remove the bandaid after 24 hours. If there is minor oozing, apply another bandaid and remove it after 12 hours.    It is normal to have a small bruise or pea size lump at the site.    You may shower tomorrow. Do NOT take a bath, or use a hot tub or pool for at least 3 days. Do NOT scrub the site. Do not use lotion or powder near the puncture site.     Activity:            For 2 days:    No stooping or squatting    Do NOT do any heavy activity such as exercise, lifting, or straining.     No housework, yard work or any activity that make you sweat    Do NOT lift more than 10 pounds    Bleeding:      If you start bleeding from the site in your groin, lie down flat and press firmly on/above the site for 10 minutes.     Once bleeding stops, lay flat for 2 hours.     Call [facility] as soon as you can.       Call 911 right away if you have heavy bleeding or bleeding that does not stop.      Medicines:      If you are taking an antiplatelet medication such as Plavix, Brilinta or Effient, do not stop taking it until you talk to your cardiologist.        If you are on Metformin (Glucophage), do not restart it until you have blood tests (within 2 to 3 days after discharge).  After you have your blood drawn, you may restart the Metformin.     Take your medications, including blood  thinners, unless your provider tells you not to.  If you take Coumadin (Warfarin), have your INR checked by your provider in  3-5 days. Call your clinic to schedule this.    If you have stopped any medicines, check with your provider about when to restart them.    Follow Up Appointments:      Follow up with [facility] Nurse Practitioner at [facility] of patient preference in 7-10 days.    Cardiac Rehab will contact you for follow up care.    Call the clinic if:      You have increased pain or a large or growing hard lump around the site.    The site is red, swollen, hot or tender.    Blood or fluid is draining from the site.    You have chills or a fever greater than 101 F (38 C).    Your leg feels numb, cool or changes color.    You have hives, a rash or unusual itching.    New pain in the back or belly that you cannot control with Tylenol.    Any questions or concerns.      Other Instructions:      If you received a stent - carry your stent card with you at all times.      [facility] [place]:    [phone] [facility] (7 days a week)

## 2020-05-15 NOTE — Clinical Note
The first balloon was inserted into the left anterior descending and middle left anterior descending.Max pressure = 10 jose. Total duration = 20 seconds.     Max pressure = 12 jose. Total duration = 9 seconds.    Balloon reinflated a second time: Max pressure = 12 jose. Total duration = 9 seconds.  Balloon reinflated a third time: Max pressure = 12 jose. Total duration = 4 seconds.

## 2020-05-15 NOTE — Clinical Note
The first balloon was inserted into the left anterior descending and middle left anterior descending.Max pressure = 12 jose. Total duration = 9 seconds.     Max pressure = 12 jose. Total duration = 4 seconds.    Balloon reinflated a second time: Max pressure = 12 jose. Total duration = 4 seconds.

## 2020-05-15 NOTE — PROGRESS NOTES
Care Suites Post Procedure Note    Patient Information  Name: Bridger Castillo  Age: 69 year old    Post Procedure  Time patient returned to Care Suites: 1405  Concerns/abnormal assessment: none  If abnormal assessment, provider notified: N/A  Plan/Other: monitor/observe until discharge  Right groin site remains soft, with Angio-seal, gauze, and tegaderm. No signs of bleeding or hematoma. NSR 1st degree AV block, HR 60-80. Denies any c/o. Ate box lunch, taking PO fluids well. Angio-seal booklet and stent card given to pt. AVS was reviewed in depth with pt's wife over the phone. Report given to Radha HAMILTON RN.    Aparna Palmer RN

## 2020-05-15 NOTE — Clinical Note
Stent deployed in the middle left anterior descending. Max pressure = 12 jose. Total duration = 9 seconds. Balloon reinflated a second time: Max pressure = 12 ojse. Total duration = 7 seconds.

## 2020-05-15 NOTE — Clinical Note
The first balloon was inserted into the left anterior descending and middle left anterior descending.Max pressure = 12 jose. Total duration = 11 seconds.     Max pressure = 20 jose. Total duration = 10 seconds.    Balloon reinflated a second time: Max pressure = 20 jose. Total duration = 10 seconds.

## 2020-05-15 NOTE — PROGRESS NOTES
Care Suites Discharge Nursing Note    Patient Information  Name: Bridger Castillo  Age: 69 year old    Discharge Education:  Discharge instructions reviewed: Yes with patient at bedside and wife over the phone.  Additional education/resources provided: angioseal pamphlet  Patient/patient representative verbalizes understanding: Yes  Patient discharging on new medications: No  Medication education completed: Yes    Discharge Plans:   Discharge location: Care Suites to home  Discharge ride contacted: Yes, spouse called   Approximate discharge time: 1645    Discharge Criteria:  Discharge criteria met and vital signs stable: Yes, ate meal, ambulated and voided. Stable (R) groin site.    Patient Belongs:  Patient belongings returned to patient: Yes    Leslie Mckenzie RN

## 2020-05-15 NOTE — Clinical Note
Stent deployed in the middle left anterior descending. Max pressure = 14 jose. Total duration = 20 seconds. Balloon reinflated a second time: Max pressure = 20 jose. Total duration = 12 seconds. Balloon reinflated a third time: Max pressure = 20 jose. Total duration = 8 seconds.

## 2020-05-15 NOTE — Clinical Note
Stent deployed in the middle left anterior descending. Max pressure = 12 jose. Total duration = 16 seconds.

## 2020-05-15 NOTE — PROGRESS NOTES
Care Suites Admission Nursing Note    Patient Information  Name: Bridger Castillo  Age: 69 year old  Reason for admission: heart cath  Care Suites arrival time: 0900    Patient Admission/Assessment   Pre-procedure assessment complete: Yes  If abnormal assessment/labs, provider notified: N/A  NPO: Yes  Medications held per instructions/orders: Yes  Consent: deferred  If applicable, pregnancy test status: deferred  Patient oriented to room: Yes  Education/questions answered: Yes  Plan/other: as planned    Discharge Planning  Accompanied by: self  Discharge name/phone number: wife Consuelo  [phone]  Overnight post sedation caregiver: Consuelo  Discharge location: home    Aparna Palmer RN

## 2020-05-22 LAB
INTERPRETATION ECG - MUSE: NORMAL
INTERPRETATION ECG - MUSE: NORMAL

## 2020-05-27 ENCOUNTER — VIRTUAL VISIT (OUTPATIENT)
Dept: CARDIOLOGY | Facility: CLINIC | Age: 70
End: 2020-05-27
Payer: COMMERCIAL

## 2020-05-27 DIAGNOSIS — I25.10 CORONARY ARTERY DISEASE INVOLVING NATIVE CORONARY ARTERY OF NATIVE HEART WITHOUT ANGINA PECTORIS: Primary | ICD-10-CM

## 2020-05-27 DIAGNOSIS — I10 BENIGN HYPERTENSION: ICD-10-CM

## 2020-05-27 DIAGNOSIS — E78.00 HYPERCHOLESTEROLEMIA: ICD-10-CM

## 2020-05-27 PROCEDURE — 99213 OFFICE O/P EST LOW 20 MIN: CPT | Mod: 95 | Performed by: NURSE PRACTITIONER

## 2020-05-27 NOTE — PATIENT INSTRUCTIONS
Thanks for participating in a telephone visit with the [facility] today.    Doing well after recent stent procedure  Denies any cardiac symptoms of chest pain or shortness of breath.   Continue on Effient and aspirin uninterrupted x 12 months.   Continue on all other medications.  Encourage exercise, weight loss, and heart healthy diet.     Follow up in 6 months with [facility] and Dr. Hartmann in 1 year.     Please call my nurse at [phone] with any questions or concerns.    Scheduling phone number: [phone]  Reminder: Please bring in all current medications, over the counter supplements and vitamin bottles to your next appointment.

## 2020-05-27 NOTE — LETTER
5/27/2020    Gustavo Neumann MD  [employer] [address]  [place] [address]    RE: Bridger Castillo       Dear Colleague,    I had the pleasure of seeing Bridger Castillo in the [facility].    Bridger Castillo is very pleasant 69 year old male who carries a PMH significant for STEMI (2011) followed by aspiration atherectomy and JENNIFER to RCA, hypertension, and hyperlipidemia.     He recently underwent a routine stress test that demonstrated stress- induced wall motion abnormalities consistent with ischemia in the anterior wall.  EF was normal at 60 to 65%.  He subsequently underwent a coronary angiogram and stenting of the mid LAD using a 3.0 x 38 mm JENNIFER. RCA was widely patent and mild nonobstructive disease was noted elsewhere. He has remained on long term DAPT ( effient and aspirin), metoprolol, lisinopril, crestor and Zetia.  Today he participates in a post angiogram follow up via telephone visit.     He offers no cardiac complaint, denies chest pain, shortness of breath, palpitations, PND, orthopnea, presyncope, syncope, or lower extremity edema.  He reports a complete healing of the right femoral access site with no evidence of ecchymosis or hematoma.    Blood pressures are well controlled on metoprolol and lisinopril.   Last BMP showed a sodium of 137, potassium 4.2, BUN 27, creatinine 1.46 (baseline), and GFR 49  Hemoglobin 15.5 and platelet 204, denies any evidence of bleeding on dual antiplatelet therapy  Last lipid panel in 12/2019 showed a total cholesterol of 147, HDL 54, LDL 77, and triglycerides 79, managed on Zetia and rosuvastatin.    Activity level is unchanged  Eating and sleeping well  Compliant with all medications.    Assessment/Plan:    1.  Coronary artery disease -remote history of drug-eluting stent to the RCA.  Recent abnormal nuclear stress test followed by coronary angiogram and stenting of the 70% mid LAD using a 3.0 x 38 mm drug-eluting stent.  RCA stent  was widely patent.  Mild nonobstructive disease was noted elsewhere.  Continue on dual antiplatelet therapy (Effient and aspirin) for minimum of 1 year, metoprolol, lisinopril, rosuvastatin, and Zetia.  Encourage exercise, weight loss, and heart healthy diet.    2.  Hypertension -well-controlled on current medical therapy  3.  Hyperlipidemia -last lipid panel showed a total cholesterol of 147, HDL 54, LDL 77, and triglycerides 79.  Continue on Zetia and rosuvastatin      Follow up plan: Follow-up in 6 months with [facility] and cardiologist in 1 year.    I have reviewed the note as documented above.  This accurately captures the substance of my conversation with the patient.      Phone call contact time 10 min    JIMY Jo, CNP  Pager [phone]  5/27/2020     Thank you for allowing me to participate in the care of your patient.      Sincerely,     JIMY Jo CNP     [facility]

## 2020-05-27 NOTE — PROGRESS NOTES
"Bridger Castillo is a 69 year old male who is being evaluated via a billable telephone visit.      The patient has been notified of following:     \"This telephone visit will be conducted via a call between you and your physician/provider. We have found that certain health care needs can be provided without the need for a physical exam.  This service lets us provide the care you need with a short phone conversation.  If a prescription is necessary we can send it directly to your pharmacy.  If lab work is needed we can place an order for that and you can then stop by our lab to have the test done at a later time.    Telephone visits are billed at different rates depending on your insurance coverage. During this emergency period, for some insurers they may be billed the same as an in-person visit.  Please reach out to your insurance provider with any questions.    If during the course of the call the physician/provider feels a telephone visit is not appropriate, you will not be charged for this service.\"    Patient has given verbal consent for Telephone visit?  Yes    What phone number would you like to be contacted at? [phone]    How would you like to obtain your AVS? MyChart         Review Of Systems  Skin: NEGATIVE  Eyes:Ears/Nose/Throat: NEGATIVE  Respiratory: NEGATIVE  Cardiovascular:NEGATIVE  Gastrointestinal: NEGATIVE  Genitourinary:NEGATIVE   Musculoskeletal: NEGATIVE  Neurologic: NEGATIVE  Psychiatric: NEGATIVE  Hematologic/Lymphatic/Immunologic: NEGATIVE  Endocrine:  NEGATIVE    Nata Lagos LPN      I have reviewed and updated the patient's Past Medical History, Social History, Family History and Medication List.    MEDICATIONS:  Current Outpatient Medications   Medication Sig Dispense Refill     aspirin 81 MG tablet Take 1 tablet by mouth daily.       Cholecalciferol (VITAMIN D) 2000 UNITS CAPS Take 2,000 Units by mouth daily       cinnamon 500 MG CAPS Take 500 mg by mouth daily       ezetimibe (ZETIA) " 10 MG tablet Take 1 tablet (10 mg) by mouth every evening 90 tablet 11     lisinopril (PRINIVIL/ZESTRIL) 20 MG tablet Take 1 tablet (20 mg) by mouth daily 90 tablet 11     metoprolol succinate ER (TOPROL-XL) 25 MG 24 hr tablet Take 1 tablet (25 mg) by mouth daily 90 tablet 11     nitroGLYcerin (NITROSTAT) 0.4 MG sublingual tablet Place 1 tablet (0.4 mg) under the tongue every 5 minutes as needed for chest pain 25 tablet 3     prasugrel (EFFIENT) 10 MG TABS tablet Take 1 tablet (10 mg) by mouth daily 90 tablet 3     rosuvastatin (CRESTOR) 40 MG tablet Take 1 tablet (40 mg) by mouth daily 90 tablet 11       ALLERGIES  Patient has no known allergies.     HPI:  Bridger Castillo is very pleasant 69 year old male who carries a PMH significant for STEMI (2011) followed by aspiration atherectomy and JENNIFER to RCA, hypertension, and hyperlipidemia.     He recently underwent a routine stress test that demonstrated stress- induced wall motion abnormalities consistent with ischemia in the anterior wall.  EF was normal at 60 to 65%.  He subsequently underwent a coronary angiogram and stenting of the mid LAD using a 3.0 x 38 mm JENNIFER. RCA was widely patent and mild nonobstructive disease was noted elsewhere. He has remained on long term DAPT ( effient and aspirin), metoprolol, lisinopril, crestor and Zetia.  Today he participates in a post angiogram follow up via telephone visit.     He offers no cardiac complaint, denies chest pain, shortness of breath, palpitations, PND, orthopnea, presyncope, syncope, or lower extremity edema.  He reports a complete healing of the right femoral access site with no evidence of ecchymosis or hematoma.    Blood pressures are well controlled on metoprolol and lisinopril.   Last BMP showed a sodium of 137, potassium 4.2, BUN 27, creatinine 1.46 (baseline), and GFR 49  Hemoglobin 15.5 and platelet 204, denies any evidence of bleeding on dual antiplatelet therapy  Last lipid panel in 12/2019 showed a total  cholesterol of 147, HDL 54, LDL 77, and triglycerides 79, managed on Zetia and rosuvastatin.    Activity level is unchanged  Eating and sleeping well  Compliant with all medications.    Assessment/Plan:    1.  Coronary artery disease -remote history of drug-eluting stent to the RCA.  Recent abnormal nuclear stress test followed by coronary angiogram and stenting of the 70% mid LAD using a 3.0 x 38 mm drug-eluting stent.  RCA stent was widely patent.  Mild nonobstructive disease was noted elsewhere.  Continue on dual antiplatelet therapy (Effient and aspirin) for minimum of 1 year, metoprolol, lisinopril, rosuvastatin, and Zetia.  Encourage exercise, weight loss, and heart healthy diet.    2.  Hypertension -well-controlled on current medical therapy  3.  Hyperlipidemia -last lipid panel showed a total cholesterol of 147, HDL 54, LDL 77, and triglycerides 79.  Continue on Zetia and rosuvastatin      Follow up plan: Follow-up in 6 months with [facility] and cardiologist in 1 year.    I have reviewed the note as documented above.  This accurately captures the substance of my conversation with the patient.      Phone call contact time 10 min    JIMY Jo, CNP  Pager [phone]  5/27/2020

## 2020-06-01 DIAGNOSIS — I21.3 ST ELEVATION MYOCARDIAL INFARCTION (STEMI), UNSPECIFIED ARTERY (H): ICD-10-CM

## 2020-06-01 RX ORDER — EZETIMIBE 10 MG/1
10 TABLET ORAL EVERY EVENING
Qty: 90 TABLET | Refills: 1 | Status: SHIPPED | OUTPATIENT
Start: 2020-06-01 | End: 2020-07-06

## 2020-06-16 DIAGNOSIS — E78.5 HYPERLIPIDEMIA LDL GOAL <70: ICD-10-CM

## 2020-06-16 RX ORDER — ROSUVASTATIN CALCIUM 40 MG/1
40 TABLET, COATED ORAL DAILY
Qty: 90 TABLET | Refills: 0 | Status: SHIPPED | OUTPATIENT
Start: 2020-06-16 | End: 2020-07-06

## 2020-07-06 DIAGNOSIS — Z11.59 ENCOUNTER FOR SCREENING FOR OTHER VIRAL DISEASES: Primary | ICD-10-CM

## 2020-07-14 DIAGNOSIS — Z11.59 ENCOUNTER FOR SCREENING FOR OTHER VIRAL DISEASES: ICD-10-CM

## 2020-07-14 PROCEDURE — U0003 INFECTIOUS AGENT DETECTION BY NUCLEIC ACID (DNA OR RNA); SEVERE ACUTE RESPIRATORY SYNDROME CORONAVIRUS 2 (SARS-COV-2) (CORONAVIRUS DISEASE [COVID-19]), AMPLIFIED PROBE TECHNIQUE, MAKING USE OF HIGH THROUGHPUT TECHNOLOGIES AS DESCRIBED BY CMS-2020-01-R: HCPCS | Performed by: INTERNAL MEDICINE

## 2020-07-15 LAB
SARS-COV-2 RNA SPEC QL NAA+PROBE: NOT DETECTED
SPECIMEN SOURCE: NORMAL

## 2020-07-16 ENCOUNTER — ANESTHESIA (OUTPATIENT)
Dept: GASTROENTEROLOGY | Facility: CLINIC | Age: 70
End: 2020-07-16
Payer: COMMERCIAL

## 2020-07-16 ENCOUNTER — ANESTHESIA EVENT (OUTPATIENT)
Dept: GASTROENTEROLOGY | Facility: CLINIC | Age: 70
End: 2020-07-16
Payer: COMMERCIAL

## 2020-07-16 ENCOUNTER — HOSPITAL ENCOUNTER (OUTPATIENT)
Facility: CLINIC | Age: 70
Discharge: HOME | End: 2020-07-16
Attending: INTERNAL MEDICINE | Admitting: INTERNAL MEDICINE
Payer: COMMERCIAL

## 2020-07-16 VITALS
BODY MASS INDEX: 26.01 KG/M2 | DIASTOLIC BLOOD PRESSURE: 79 MMHG | OXYGEN SATURATION: 97 % | TEMPERATURE: 97.7 F | WEIGHT: 192 LBS | HEIGHT: 72 IN | RESPIRATION RATE: 16 BRPM | SYSTOLIC BLOOD PRESSURE: 114 MMHG

## 2020-07-16 LAB — COLONOSCOPY: NORMAL

## 2020-07-16 PROCEDURE — 25800030 ZZH RX IP 258 OP 636: Performed by: NURSE ANESTHETIST, CERTIFIED REGISTERED

## 2020-07-16 PROCEDURE — 88305 TISSUE EXAM BY PATHOLOGIST: CPT | Mod: 26 | Performed by: INTERNAL MEDICINE

## 2020-07-16 PROCEDURE — 88305 TISSUE EXAM BY PATHOLOGIST: CPT | Performed by: INTERNAL MEDICINE

## 2020-07-16 PROCEDURE — 37000008 ZZH ANESTHESIA TECHNICAL FEE, 1ST 30 MIN: Performed by: INTERNAL MEDICINE

## 2020-07-16 PROCEDURE — 27210582 ZZH DEVICE CLIP RESOLUTION, EACH: Performed by: INTERNAL MEDICINE

## 2020-07-16 PROCEDURE — 40000010 ZZH STATISTIC ANES STAT CODE-CRNA PER MINUTE: Performed by: INTERNAL MEDICINE

## 2020-07-16 PROCEDURE — 25000125 ZZHC RX 250: Performed by: NURSE ANESTHETIST, CERTIFIED REGISTERED

## 2020-07-16 PROCEDURE — 37000009 ZZH ANESTHESIA TECHNICAL FEE, EACH ADDTL 15 MIN: Performed by: INTERNAL MEDICINE

## 2020-07-16 PROCEDURE — 25000128 H RX IP 250 OP 636: Performed by: NURSE ANESTHETIST, CERTIFIED REGISTERED

## 2020-07-16 PROCEDURE — 45380 COLONOSCOPY AND BIOPSY: CPT | Performed by: INTERNAL MEDICINE

## 2020-07-16 RX ORDER — PROPOFOL 10 MG/ML
INJECTION, EMULSION INTRAVENOUS CONTINUOUS PRN
Status: DISCONTINUED | OUTPATIENT
Start: 2020-07-16 | End: 2020-07-16

## 2020-07-16 RX ORDER — FLUMAZENIL 0.1 MG/ML
0.2 INJECTION, SOLUTION INTRAVENOUS
Status: DISCONTINUED | OUTPATIENT
Start: 2020-07-16 | End: 2020-07-16 | Stop reason: HOSPADM

## 2020-07-16 RX ORDER — ONDANSETRON 2 MG/ML
INJECTION INTRAMUSCULAR; INTRAVENOUS PRN
Status: DISCONTINUED | OUTPATIENT
Start: 2020-07-16 | End: 2020-07-16

## 2020-07-16 RX ORDER — LIDOCAINE HYDROCHLORIDE 20 MG/ML
INJECTION, SOLUTION INFILTRATION; PERINEURAL PRN
Status: DISCONTINUED | OUTPATIENT
Start: 2020-07-16 | End: 2020-07-16

## 2020-07-16 RX ORDER — ONDANSETRON 2 MG/ML
4 INJECTION INTRAMUSCULAR; INTRAVENOUS EVERY 6 HOURS PRN
Status: DISCONTINUED | OUTPATIENT
Start: 2020-07-16 | End: 2020-07-16 | Stop reason: HOSPADM

## 2020-07-16 RX ORDER — LIDOCAINE 40 MG/G
CREAM TOPICAL
Status: DISCONTINUED | OUTPATIENT
Start: 2020-07-16 | End: 2020-07-16 | Stop reason: HOSPADM

## 2020-07-16 RX ORDER — ONDANSETRON 4 MG/1
4 TABLET, ORALLY DISINTEGRATING ORAL EVERY 6 HOURS PRN
Status: DISCONTINUED | OUTPATIENT
Start: 2020-07-16 | End: 2020-07-16 | Stop reason: HOSPADM

## 2020-07-16 RX ORDER — ONDANSETRON 2 MG/ML
4 INJECTION INTRAMUSCULAR; INTRAVENOUS
Status: DISCONTINUED | OUTPATIENT
Start: 2020-07-16 | End: 2020-07-16 | Stop reason: HOSPADM

## 2020-07-16 RX ORDER — SODIUM CHLORIDE, SODIUM LACTATE, POTASSIUM CHLORIDE, CALCIUM CHLORIDE 600; 310; 30; 20 MG/100ML; MG/100ML; MG/100ML; MG/100ML
INJECTION, SOLUTION INTRAVENOUS CONTINUOUS PRN
Status: DISCONTINUED | OUTPATIENT
Start: 2020-07-16 | End: 2020-07-16

## 2020-07-16 RX ORDER — NALOXONE HYDROCHLORIDE 0.4 MG/ML
.1-.4 INJECTION, SOLUTION INTRAMUSCULAR; INTRAVENOUS; SUBCUTANEOUS
Status: DISCONTINUED | OUTPATIENT
Start: 2020-07-16 | End: 2020-07-16 | Stop reason: HOSPADM

## 2020-07-16 RX ADMIN — SODIUM CHLORIDE, POTASSIUM CHLORIDE, SODIUM LACTATE AND CALCIUM CHLORIDE: 600; 310; 30; 20 INJECTION, SOLUTION INTRAVENOUS at 08:33

## 2020-07-16 RX ADMIN — LIDOCAINE HYDROCHLORIDE 60 MG: 20 INJECTION, SOLUTION INFILTRATION; PERINEURAL at 08:35

## 2020-07-16 RX ADMIN — ONDANSETRON 4 MG: 2 INJECTION INTRAMUSCULAR; INTRAVENOUS at 08:50

## 2020-07-16 RX ADMIN — PROPOFOL 150 MCG/KG/MIN: 10 INJECTION, EMULSION INTRAVENOUS at 08:35

## 2020-07-16 ASSESSMENT — MIFFLIN-ST. JEOR: SCORE: 1683.91

## 2020-07-16 ASSESSMENT — LIFESTYLE VARIABLES: TOBACCO_USE: 0

## 2020-07-16 ASSESSMENT — ENCOUNTER SYMPTOMS: SEIZURES: 0

## 2020-07-16 NOTE — ANESTHESIA POSTPROCEDURE EVALUATION
Patient: Bridger Castillo    Procedure(s):  COLONOSCOPY, WITH POLYPECTOMY AND BIOPSY    Diagnosis:Screening for malignant neoplasm of colon [Z12.11]  Diagnosis Additional Information: No value filed.    Anesthesia Type:  MAC    Note:  Anesthesia Post Evaluation    Patient location during evaluation: PACU  Patient participation: Able to fully participate in evaluation  Level of consciousness: awake  Pain management: adequate  Airway patency: patent  Cardiovascular status: acceptable  Respiratory status: acceptable  Hydration status: acceptable  PONV: none     Anesthetic complications: None          Last vitals:  Vitals:    07/16/20 0905 07/16/20 0906 07/16/20 0910   BP: 111/74  114/79   Resp:  21 16   Temp:      SpO2:  98% 97%         Electronically Signed By: Mehnaz Day  July 16, 2020  2:55 PM

## 2020-07-16 NOTE — ANESTHESIA CARE TRANSFER NOTE
Patient: Bridger Castillo    Procedure(s):  COLONOSCOPY, WITH POLYPECTOMY AND BIOPSY    Diagnosis: Screening for malignant neoplasm of colon [Z12.11]  Diagnosis Additional Information: No value filed.    Anesthesia Type:   MAC     Note:  Airway :Nasal Cannula  Patient transferred to:PACU  Comments: Transferred to Endo PACU, spontaneous RR, on NC 3L.  Monitors and alarms on and functioning, VSS, patient awake and comfortable.  Report to Endo PACU RN.Handoff Report: Identifed the Patient, Identified the Reponsible Provider, Reviewed the pertinent medical history, Discussed the surgical course, Reviewed Intra-OP anesthesia mangement and issues during anesthesia, Set expectations for post-procedure period and Allowed opportunity for questions and acknowledgement of understanding      Vitals: (Last set prior to Anesthesia Care Transfer)    CRNA VITALS  7/16/2020 0833 - 7/16/2020 0906      7/16/2020             Pulse:  72    Ht Rate:  78    SpO2:  97 %    Resp Rate (set):  10                Electronically Signed By: JIMY Mendoza CRNA  July 16, 2020  9:06 AM

## 2020-07-16 NOTE — H&P
[facility]  Pre-Endoscopy History and Physical     Bridger Castillo MRN# [id number]   YOB: 1950 Age: 69 year old   Today's Date: 07/16/2020    Date of Procedure: 7/16/2020  Primary care provider: Gustavo Neumann  Type of Endoscopy: colonoscopy  Reason for Procedure: Family history colon cancer  Type of Anesthesia Anticipated: MAC IV    HPI:    Bridger is a 69 year old male who will be undergoing the above procedure.      A history and physical has been performed. The patient's medications and allergies have been reviewed. The risks and benefits of the procedure and the sedation options and risks were discussed with the patient.  All questions were answered and informed consent was obtained.      No Known Allergies     No current facility-administered medications for this encounter.        Patient Active Problem List   Diagnosis     Hyperlipidemia LDL goal <70     Benign hypertension     STEMI (ST elevation myocardial infarction) (H)     Chest pain     Coronary artery disease involving native coronary artery of native heart without angina pectoris     Hypercholesterolemia     Ischemic cardiomyopathy     Hyperlipidemia LDL goal <100     ST elevation myocardial infarction (STEMI), unspecified artery (H)     Coronary artery disease involving native coronary artery of native heart, angina presence unspecified     Status post coronary angiogram        Past Medical History:   Diagnosis Date     Arthritis      Hyperlipemia      Hypertension      Myocardial infarction (H)     2011 STEMI     Post PTCA     JENNIFER to RCA 2011        Past Surgical History:   Procedure Laterality Date     CV CORONARY ANGIOGRAM N/A 5/15/2020    Procedure: Coronary Angiogram;  Surgeon: August Hartmann MD;  Location: [facility] CARDIAC CATH LAB     CV PCI STENT DRUG ELUTING N/A 5/15/2020    Procedure: Percutaneous Coronary Intervention Stent Drug Eluting;  Surgeon: August Hartmann MD;  Location: [facility]  CARDIAC CATH LAB     HEART CATH, ANGIOPLASTY      JENNIFER RCA 2011, 70% stenosis LAD     ORTHOPEDIC SURGERY      right knee replacement       Social History     Tobacco Use     Smoking status: Never Smoker     Smokeless tobacco: Never Used   Substance Use Topics     Alcohol use: Yes     Alcohol/week: 0.8 standard drinks     Types: 1 Cans of beer per week     Comment: 1 drink  week       Family History   Problem Relation Age of Onset     Hypertension Brother      Hypertension Father      Cancer - colorectal Father      Cancer Mother         lung     Other - See Comments Brother         heamachromatosis     Coronary Artery Disease Brother 61        MI         PHYSICAL EXAM:   BP (!) 145/96   Temp 97.7  F (36.5  C) (Oral)   Resp 16   Ht 1.829 m (6')   Wt 87.1 kg (192 lb)   SpO2 100%   BMI 26.04 kg/m   Estimated body mass index is 26.04 kg/m  as calculated from the following:    Height as of this encounter: 1.829 m (6').    Weight as of this encounter: 87.1 kg (192 lb).   RESP: lungs clear to auscultation - no rales, rhonchi or wheezes  CV: regular rates and rhythm    IMPRESSION   ASA Class See anesth  Mallampati Score: See anesth      Luis Beach MD

## 2020-07-16 NOTE — ANESTHESIA PREPROCEDURE EVALUATION
Anesthesia Pre-Procedure Evaluation    Patient: Bridger Castillo   MRN: [id number] : 1950          Preoperative Diagnosis: Screening for malignant neoplasm of colon [Z12.11]    Procedure(s):  COLONOSCOPY    Past Medical History:   Diagnosis Date     Arthritis      Hyperlipemia      Hypertension      Myocardial infarction (H)      STEMI     Post PTCA     JENNIFER to RCA      Past Surgical History:   Procedure Laterality Date     CV CORONARY ANGIOGRAM N/A 5/15/2020    Procedure: Coronary Angiogram;  Surgeon: August Hartmann MD;  Location:  HEART CARDIAC CATH LAB     CV PCI STENT DRUG ELUTING N/A 5/15/2020    Procedure: Percutaneous Coronary Intervention Stent Drug Eluting;  Surgeon: August Hartmann MD;  Location:  HEART CARDIAC CATH LAB     HEART CATH, ANGIOPLASTY      JENNIFER RCA , 70% stenosis LAD     ORTHOPEDIC SURGERY      right knee replacement       Anesthesia Evaluation     . Pt has not had prior anesthetic            ROS/MED HX    ENT/Pulmonary:      (-) tobacco use, asthma, sleep apnea and recent URI   Neurologic:      (-) seizures and CVA   Cardiovascular: Comment: Recent stent placed may 2020    On effient    (+) Dyslipidemia, hypertension--CAD, --. : . . . :. .       METS/Exercise Tolerance:     Hematologic:         Musculoskeletal:         GI/Hepatic:        (-) GERD   Renal/Genitourinary:         Endo:      (-) Type II DM and thyroid disease   Psychiatric:         Infectious Disease:         Malignancy:         Other:                          Physical Exam  Normal systems: dental    Airway   Mallampati: II  TM distance: >3 FB  Neck ROM: full    Dental     Cardiovascular   Rhythm and rate: regular and normal      Pulmonary    breath sounds clear to auscultation            Lab Results   Component Value Date    WBC 6.1 05/15/2020    HGB 15.5 05/15/2020    HCT 45.7 05/15/2020     05/15/2020     05/15/2020    POTASSIUM 4.2 05/15/2020    CHLORIDE 105 05/15/2020     CO2 26 05/15/2020    BUN 27 05/15/2020    CR 1.46 (H) 05/15/2020     (H) 05/15/2020    RYANNE 9.6 05/15/2020    PROTTOTAL 7.6 10/21/2014    ALT 16 05/29/2019    AST 28 10/21/2014    ALKPHOS 64 10/21/2014    BILITOTAL 1.2 10/21/2014    PTT 31 05/15/2020    INR 1.02 05/15/2020       Preop Vitals  BP Readings from Last 3 Encounters:   07/16/20 (!) 145/96   05/15/20 100/59   05/29/19 121/74    Pulse Readings from Last 3 Encounters:   05/15/20 80   05/29/19 70   02/15/18 72      Resp Readings from Last 3 Encounters:   07/16/20 16   05/15/20 16   12/21/11 18    SpO2 Readings from Last 3 Encounters:   07/16/20 100%   05/15/20 93%   12/21/11 98%      Temp Readings from Last 1 Encounters:   07/16/20 36.5  C (97.7  F) (Oral)    Ht Readings from Last 1 Encounters:   07/16/20 1.829 m (6')      Wt Readings from Last 1 Encounters:   07/16/20 87.1 kg (192 lb)    Estimated body mass index is 26.04 kg/m  as calculated from the following:    Height as of this encounter: 1.829 m (6').    Weight as of this encounter: 87.1 kg (192 lb).       Anesthesia Plan      History & Physical Review  History and physical reviewed and following examination; no interval change.    ASA Status:  2 .        Plan for MAC with Intravenous induction.   PONV prophylaxis:  Ondansetron (or other 5HT-3)  Patient understands no biopsies will be done today        Postoperative Care  Postoperative pain management:  IV analgesics.      Consents  Anesthetic plan, risks, benefits and alternatives discussed with:  Patient..                 Shailesh Sarabia MD

## 2020-07-17 LAB — COPATH REPORT: NORMAL

## 2020-11-16 ENCOUNTER — HEALTH MAINTENANCE LETTER (OUTPATIENT)
Age: 70
End: 2020-11-16

## 2021-04-04 ENCOUNTER — HEALTH MAINTENANCE LETTER (OUTPATIENT)
Age: 71
End: 2021-04-04

## 2021-05-28 ENCOUNTER — VIRTUAL VISIT (OUTPATIENT)
Dept: CARDIOLOGY | Facility: CLINIC | Age: 71
End: 2021-05-28
Payer: COMMERCIAL

## 2021-05-28 DIAGNOSIS — I25.10 CORONARY ARTERY DISEASE INVOLVING NATIVE CORONARY ARTERY OF NATIVE HEART WITHOUT ANGINA PECTORIS: Primary | ICD-10-CM

## 2021-05-28 DIAGNOSIS — I21.29 ST ELEVATION MYOCARDIAL INFARCTION (STEMI) INVOLVING OTHER CORONARY ARTERY (H): ICD-10-CM

## 2021-05-28 DIAGNOSIS — E78.5 HYPERLIPIDEMIA LDL GOAL <100: ICD-10-CM

## 2021-05-28 PROCEDURE — 99214 OFFICE O/P EST MOD 30 MIN: CPT | Mod: GT | Performed by: INTERNAL MEDICINE

## 2021-05-28 NOTE — LETTER
5/28/2021    Gustavo Neumann MD  [employer] [address]  [place] [address]    RE: Bridger Castillo       Dear Colleague,    I had the pleasure of seeing Bridger Castillo in the [facility].    Sunny is a 70 year old who is being evaluated via a billable video visit.      How would you like to obtain your AVS? MyChart  If the video visit is dropped, the invitation should be resent by: Text to cell phone: [phone]   Will anyone else be joining your video visit? No      Video Start Time:   Video-Visit Details    Sunny Castillo is a very pleasant 70-year-old gentleman who I had a video visit with today.  He has a previous history of known coronary artery disease including a atherectomy and stent to the right coronary artery back in 2011, and then picked up on abnormal stress test anterior ischemia last year and led to stenting to his mid LAD.  Currently he is feeling well.  He is active but does no regular cardiovascular exercise.  He walks his to 60 pound dog's on a daily basis without difficulty.  He denies any chest pain chest pressure shortness of breath heart racing palpitations or syncope.  He has been compliant with his medications.  He states that his primary physician Dr. Neumann is checked his fasting lipid profile over the past year and it has been at goal.    Given the fact that it is 1 year out from his LAD stenting I am recommending stress echocardiogram as he had silent ischemia in the past.  Otherwise I will continue to follow-up with Mr. Castillo on an annual basis.  No other issues from today's visit.    Type of service:  Video Visit    Physical exam he was at home in no acute distress    Head normocephalic atraumatic    No respiratory labored breathing or pursed lips    Neurologically intact alert and orientated    No evidence of edema    Originating Location (pt. Location): Home    Distant Location (provider location):  [facility]  "[place] [facility]     Platform used for Video Visit: Doximity     Review Of Systems  Skin: NEGATIVE  Eyes:Ears/Nose/Throat: glasses  Respiratory: NEGATIVE  Cardiovascular:NEGATIVE  Gastrointestinal: NEGATIVE  Genitourinary:NEGATIVE   Musculoskeletal: NEGATIVE  Neurologic: NEGATIVE  Psychiatric: NEGATIVE  Hematologic/Lymphatic/Immunologic: NEGATIVE  Endocrine:  NEGATIVE    Vitals - Patient Reported  Weight (Patient Reported): 90.3 kg (199 lb)  Height (Patient Reported): 185.4 cm (6' 1\")  BMI (Based on Pt Reported Ht/Wt): 26.25   [facility]    Telephone number of patient: [phone]     Thank you for allowing me to participate in the care of your patient.      Sincerely,     HANSEL ALVAREZ MD     [facility]  cc:   No referring provider defined for this encounter.      "

## 2021-05-28 NOTE — PROGRESS NOTES
Sunny is a 70 year old who is being evaluated via a billable video visit.      How would you like to obtain your AVS? MyChart  If the video visit is dropped, the invitation should be resent by: Text to cell phone: [phone]   Will anyone else be joining your video visit? No      Video Start Time:   Video-Visit Details    Sunny Castillo is a very pleasant 70-year-old gentleman who I had a video visit with today.  He has a previous history of known coronary artery disease including a atherectomy and stent to the right coronary artery back in 2011, and then picked up on abnormal stress test anterior ischemia last year and led to stenting to his mid LAD.  Currently he is feeling well.  He is active but does no regular cardiovascular exercise.  He walks his to 60 pound dog's on a daily basis without difficulty.  He denies any chest pain chest pressure shortness of breath heart racing palpitations or syncope.  He has been compliant with his medications.  He states that his primary physician Dr. Neumann is checked his fasting lipid profile over the past year and it has been at goal.    Given the fact that it is 1 year out from his LAD stenting I am recommending stress echocardiogram as he had silent ischemia in the past.  Otherwise I will continue to follow-up with Mr. Castillo on an annual basis.  No other issues from today's visit.    Type of service:  Video Visit    Physical exam he was at home in no acute distress    Head normocephalic atraumatic    No respiratory labored breathing or pursed lips    Neurologically intact alert and orientated    No evidence of edema    Originating Location (pt. Location): Home    Distant Location (provider location):  [facility]     [facility]     Review Of Systems  Skin: NEGATIVE  Eyes:Ears/Nose/Throat: glasses  Respiratory: NEGATIVE  Cardiovascular:NEGATIVE  Gastrointestinal: NEGATIVE  Genitourinary:NEGATIVE   Musculoskeletal:  "NEGATIVE  Neurologic: NEGATIVE  Psychiatric: NEGATIVE  Hematologic/Lymphatic/Immunologic: NEGATIVE  Endocrine:  NEGATIVE    Vitals - Patient Reported  Weight (Patient Reported): 90.3 kg (199 lb)  Height (Patient Reported): 185.4 cm (6' 1\")  BMI (Based on Pt Reported Ht/Wt): 26.25   [place] LPN        Telephone number of patient: [phone]   "

## 2021-06-22 ENCOUNTER — HOSPITAL ENCOUNTER (OUTPATIENT)
Dept: CARDIOLOGY | Facility: CLINIC | Age: 71
Discharge: HOME | End: 2021-06-22
Attending: INTERNAL MEDICINE | Admitting: INTERNAL MEDICINE
Payer: COMMERCIAL

## 2021-06-22 DIAGNOSIS — I25.10 CORONARY ARTERY DISEASE INVOLVING NATIVE CORONARY ARTERY OF NATIVE HEART WITHOUT ANGINA PECTORIS: ICD-10-CM

## 2021-06-22 DIAGNOSIS — I25.10 CORONARY ARTERY DISEASE INVOLVING NATIVE CORONARY ARTERY OF NATIVE HEART, ANGINA PRESENCE UNSPECIFIED: Primary | ICD-10-CM

## 2021-06-22 DIAGNOSIS — I21.29 ST ELEVATION MYOCARDIAL INFARCTION (STEMI) INVOLVING OTHER CORONARY ARTERY (H): ICD-10-CM

## 2021-06-22 DIAGNOSIS — Z98.890 STATUS POST CORONARY ANGIOGRAM: ICD-10-CM

## 2021-06-22 DIAGNOSIS — E78.5 HYPERLIPIDEMIA LDL GOAL <100: ICD-10-CM

## 2021-06-22 PROCEDURE — 93350 STRESS TTE ONLY: CPT | Mod: 26 | Performed by: INTERNAL MEDICINE

## 2021-06-22 PROCEDURE — 255N000002 HC RX 255 OP 636: Performed by: INTERNAL MEDICINE

## 2021-06-22 PROCEDURE — 93016 CV STRESS TEST SUPVJ ONLY: CPT | Performed by: INTERNAL MEDICINE

## 2021-06-22 PROCEDURE — 93018 CV STRESS TEST I&R ONLY: CPT | Performed by: INTERNAL MEDICINE

## 2021-06-22 PROCEDURE — 93321 DOPPLER ECHO F-UP/LMTD STD: CPT | Mod: 26 | Performed by: INTERNAL MEDICINE

## 2021-06-22 PROCEDURE — 93325 DOPPLER ECHO COLOR FLOW MAPG: CPT | Mod: 26 | Performed by: INTERNAL MEDICINE

## 2021-06-22 RX ADMIN — HUMAN ALBUMIN MICROSPHERES AND PERFLUTREN 9 ML: 10; .22 INJECTION, SOLUTION INTRAVENOUS at 15:15

## 2021-07-01 NOTE — RESULT ENCOUNTER NOTE
Stress echo results are normal.  No ischemia or infarction.  Instructed patient on trying to exercise on regular basis if possible per DR. Hartmann.  Left VM instructing him to call if any questions.

## 2021-07-05 DIAGNOSIS — I21.3 ST ELEVATION MYOCARDIAL INFARCTION (STEMI), UNSPECIFIED ARTERY (H): ICD-10-CM

## 2021-07-05 DIAGNOSIS — I25.10 CORONARY ARTERY DISEASE INVOLVING NATIVE CORONARY ARTERY OF NATIVE HEART WITHOUT ANGINA PECTORIS: ICD-10-CM

## 2021-07-05 DIAGNOSIS — I25.10 CORONARY ARTERY DISEASE INVOLVING NATIVE CORONARY ARTERY OF NATIVE HEART, ANGINA PRESENCE UNSPECIFIED: ICD-10-CM

## 2021-07-05 RX ORDER — PRASUGREL 10 MG/1
10 TABLET, FILM COATED ORAL DAILY
Qty: 90 TABLET | Refills: 3 | Status: SHIPPED | OUTPATIENT
Start: 2021-07-05 | End: 2022-07-13

## 2021-07-05 RX ORDER — LISINOPRIL 20 MG/1
20 TABLET ORAL DAILY
Qty: 90 TABLET | Refills: 3 | Status: SHIPPED | OUTPATIENT
Start: 2021-07-05 | End: 2022-06-15

## 2021-07-05 RX ORDER — METOPROLOL SUCCINATE 25 MG/1
25 TABLET, EXTENDED RELEASE ORAL DAILY
Qty: 90 TABLET | Refills: 3 | Status: SHIPPED | OUTPATIENT
Start: 2021-07-05 | End: 2022-06-15

## 2021-07-07 ENCOUNTER — TELEPHONE (OUTPATIENT)
Dept: CARDIOLOGY | Facility: CLINIC | Age: 71
End: 2021-07-07

## 2021-07-21 ENCOUNTER — TELEPHONE (OUTPATIENT)
Dept: CARDIOLOGY | Facility: CLINIC | Age: 71
End: 2021-07-21

## 2021-08-25 DIAGNOSIS — E78.5 HYPERLIPIDEMIA LDL GOAL <70: ICD-10-CM

## 2021-08-25 RX ORDER — ROSUVASTATIN CALCIUM 40 MG/1
40 TABLET, COATED ORAL DAILY
Qty: 90 TABLET | Refills: 3 | Status: SHIPPED | OUTPATIENT
Start: 2021-08-25 | End: 2022-08-26

## 2021-08-26 DIAGNOSIS — I21.3 ST ELEVATION MYOCARDIAL INFARCTION (STEMI), UNSPECIFIED ARTERY (H): ICD-10-CM

## 2021-08-26 RX ORDER — EZETIMIBE 10 MG/1
10 TABLET ORAL EVERY EVENING
Qty: 90 TABLET | Refills: 2 | Status: SHIPPED | OUTPATIENT
Start: 2021-08-26 | End: 2022-05-26

## 2021-09-18 ENCOUNTER — HEALTH MAINTENANCE LETTER (OUTPATIENT)
Age: 71
End: 2021-09-18

## 2022-04-30 ENCOUNTER — HEALTH MAINTENANCE LETTER (OUTPATIENT)
Age: 72
End: 2022-04-30

## 2022-05-26 DIAGNOSIS — I21.3 ST ELEVATION MYOCARDIAL INFARCTION (STEMI), UNSPECIFIED ARTERY (H): ICD-10-CM

## 2022-05-26 RX ORDER — EZETIMIBE 10 MG/1
10 TABLET ORAL EVERY EVENING
Qty: 90 TABLET | Refills: 0 | Status: SHIPPED | OUTPATIENT
Start: 2022-05-26 | End: 2022-08-26

## 2022-06-15 DIAGNOSIS — I25.10 CORONARY ARTERY DISEASE INVOLVING NATIVE CORONARY ARTERY OF NATIVE HEART WITHOUT ANGINA PECTORIS: ICD-10-CM

## 2022-06-15 RX ORDER — LISINOPRIL 20 MG/1
20 TABLET ORAL DAILY
Qty: 90 TABLET | Refills: 0 | Status: SHIPPED | OUTPATIENT
Start: 2022-06-15 | End: 2022-09-14

## 2022-06-15 RX ORDER — METOPROLOL SUCCINATE 25 MG/1
25 TABLET, EXTENDED RELEASE ORAL DAILY
Qty: 90 TABLET | Refills: 0 | Status: SHIPPED | OUTPATIENT
Start: 2022-06-15 | End: 2022-09-14

## 2022-06-15 NOTE — TELEPHONE ENCOUNTER
[facility] Cardiology Refill Guideline reviewed.  Medication meets criteria for refill.  Future OV 9/19/22.  Iliana Hoyos RN on 6/15/2022 at 4:38 PM

## 2022-07-13 ENCOUNTER — TELEPHONE (OUTPATIENT)
Dept: CARDIOLOGY | Facility: CLINIC | Age: 72
End: 2022-07-13

## 2022-07-13 DIAGNOSIS — I21.3 ST ELEVATION MYOCARDIAL INFARCTION (STEMI), UNSPECIFIED ARTERY (H): ICD-10-CM

## 2022-07-13 RX ORDER — PRASUGREL 10 MG/1
10 TABLET, FILM COATED ORAL DAILY
Qty: 90 TABLET | Refills: 3 | Status: SHIPPED | OUTPATIENT
Start: 2022-07-13 | End: 2022-09-19

## 2022-07-13 NOTE — TELEPHONE ENCOUNTER
[facility] Call Center    Phone Message    May a detailed message be left on voicemail: yes     Reason for Call: Medication Refill Request    Has the patient contacted the pharmacy for the refill? Yes   Name of medication being requested: prasugrel (EFFIENT) 10 MG TABS tablet  Provider who prescribed the medication: Dr Hartmann  Pharmacy: [facility] [address]  Date medication is needed: AMOS Correa is completely out of his prasugrel (EFFIENT) 10 MG TABS tablet medication. Please send a new prescription to get him to his appointment on 09.19.22 with Dr Hartmann. Thank you!    Action Taken: Other: Cardiology    Travel Screening: Not Applicable

## 2022-08-26 DIAGNOSIS — E78.5 HYPERLIPIDEMIA LDL GOAL <70: ICD-10-CM

## 2022-08-26 DIAGNOSIS — I21.3 ST ELEVATION MYOCARDIAL INFARCTION (STEMI), UNSPECIFIED ARTERY (H): ICD-10-CM

## 2022-08-26 RX ORDER — EZETIMIBE 10 MG/1
10 TABLET ORAL EVERY EVENING
Qty: 90 TABLET | Refills: 0 | Status: SHIPPED | OUTPATIENT
Start: 2022-08-26 | End: 2022-09-19

## 2022-08-26 RX ORDER — ROSUVASTATIN CALCIUM 40 MG/1
40 TABLET, COATED ORAL DAILY
Qty: 90 TABLET | Refills: 0 | Status: SHIPPED | OUTPATIENT
Start: 2022-08-26 | End: 2022-09-19

## 2022-09-14 DIAGNOSIS — I25.10 CORONARY ARTERY DISEASE INVOLVING NATIVE CORONARY ARTERY OF NATIVE HEART WITHOUT ANGINA PECTORIS: ICD-10-CM

## 2022-09-14 RX ORDER — LISINOPRIL 20 MG/1
20 TABLET ORAL DAILY
Qty: 90 TABLET | Refills: 0 | Status: SHIPPED | OUTPATIENT
Start: 2022-09-14 | End: 2022-09-19

## 2022-09-14 RX ORDER — METOPROLOL SUCCINATE 25 MG/1
25 TABLET, EXTENDED RELEASE ORAL DAILY
Qty: 90 TABLET | Refills: 0 | Status: SHIPPED | OUTPATIENT
Start: 2022-09-14 | End: 2022-09-19

## 2022-09-14 NOTE — TELEPHONE ENCOUNTER
Received refill request for:  Lisinopril, Metoprolol    [facility] Cardiology Refill Guideline reviewed.  Medication meets criteria for refill.    Sun Rhoades RN, BSN  09/14/22 at 12:41 PM

## 2022-09-19 ENCOUNTER — OFFICE VISIT (OUTPATIENT)
Dept: CARDIOLOGY | Facility: CLINIC | Age: 72
End: 2022-09-19
Payer: COMMERCIAL

## 2022-09-19 VITALS
BODY MASS INDEX: 27.04 KG/M2 | DIASTOLIC BLOOD PRESSURE: 89 MMHG | WEIGHT: 204 LBS | HEIGHT: 73 IN | SYSTOLIC BLOOD PRESSURE: 138 MMHG | HEART RATE: 69 BPM

## 2022-09-19 DIAGNOSIS — I25.10 CORONARY ARTERY DISEASE INVOLVING NATIVE CORONARY ARTERY OF NATIVE HEART WITHOUT ANGINA PECTORIS: ICD-10-CM

## 2022-09-19 DIAGNOSIS — E78.5 HYPERLIPIDEMIA LDL GOAL <70: ICD-10-CM

## 2022-09-19 DIAGNOSIS — I10 BENIGN HYPERTENSION: ICD-10-CM

## 2022-09-19 DIAGNOSIS — I21.3 ST ELEVATION MYOCARDIAL INFARCTION (STEMI), UNSPECIFIED ARTERY (H): ICD-10-CM

## 2022-09-19 DIAGNOSIS — I21.29 ST ELEVATION MYOCARDIAL INFARCTION (STEMI) INVOLVING OTHER CORONARY ARTERY (H): ICD-10-CM

## 2022-09-19 DIAGNOSIS — I25.5 ISCHEMIC CARDIOMYOPATHY: ICD-10-CM

## 2022-09-19 DIAGNOSIS — E78.00 HYPERCHOLESTEROLEMIA: ICD-10-CM

## 2022-09-19 PROCEDURE — 99213 OFFICE O/P EST LOW 20 MIN: CPT | Performed by: INTERNAL MEDICINE

## 2022-09-19 RX ORDER — LISINOPRIL 20 MG/1
20 TABLET ORAL DAILY
Qty: 90 TABLET | Refills: 0 | Status: SHIPPED | OUTPATIENT
Start: 2022-09-19 | End: 2023-02-15

## 2022-09-19 RX ORDER — PRASUGREL 10 MG/1
10 TABLET, FILM COATED ORAL DAILY
Qty: 90 TABLET | Refills: 3 | Status: ON HOLD | OUTPATIENT
Start: 2022-09-19 | End: 2023-04-17

## 2022-09-19 RX ORDER — ROSUVASTATIN CALCIUM 40 MG/1
40 TABLET, COATED ORAL DAILY
Qty: 90 TABLET | Refills: 0 | Status: SHIPPED | OUTPATIENT
Start: 2022-09-19 | End: 2023-02-15

## 2022-09-19 RX ORDER — NITROGLYCERIN 0.4 MG/1
0.4 TABLET SUBLINGUAL EVERY 5 MIN PRN
Qty: 25 TABLET | Refills: 11 | Status: SHIPPED | OUTPATIENT
Start: 2022-09-19

## 2022-09-19 RX ORDER — EZETIMIBE 10 MG/1
10 TABLET ORAL EVERY EVENING
Qty: 90 TABLET | Refills: 0 | Status: SHIPPED | OUTPATIENT
Start: 2022-09-19 | End: 2023-02-15

## 2022-09-19 RX ORDER — METOPROLOL SUCCINATE 25 MG/1
25 TABLET, EXTENDED RELEASE ORAL DAILY
Qty: 90 TABLET | Refills: 0 | Status: SHIPPED | OUTPATIENT
Start: 2022-09-19 | End: 2023-02-15

## 2022-09-19 NOTE — PROGRESS NOTES
HPI and Plan:     Sunny Kuo is a very pleasant 71-year-old previous history of ST elevation MI history of coronary intervention with JENNIFER to the RCA and mid LAD in 2020.  He has history of hypertension.  He has done well of late.  He is now working from home where he has since COVID therefore he does not bike anymore to work however he walks his dogs twice daily.  He has no symptoms of chest tightness chest pressure shortness of breath heart racing palpitation or edema.  Is been compliant with his medications.  We reviewed his laboratory results from [facility] showing an LDL cholesterol less than 186.  Blood pressures have been good.  His stress test from last year looked normal with no evidence of ischemia on echocardiogram and good exercise tolerance.    Recommend continued medical therapy.  We will follow-up with repeat stress test next year.  No changes necessary at this time    Today's clinic visit entailed:  Review of external notes as documented elsewhere in note  Review of the result(s) of each unique test - stress echocardiogram  Ordering of each unique test  No LOS data to display   Time spent doing chart review, history and exam, documentation and further activities per the note  Provider  Link to FileString Help Grid     The level of medical decision making during this visit was of moderate complexity.      No orders of the defined types were placed in this encounter.    Orders Placed This Encounter   Medications     ezetimibe (ZETIA) 10 MG tablet     Sig: Take 1 tablet (10 mg) by mouth every evening     Dispense:  90 tablet     Refill:  0     lisinopril (ZESTRIL) 20 MG tablet     Sig: Take 1 tablet (20 mg) by mouth daily     Dispense:  90 tablet     Refill:  0     metoprolol succinate ER (TOPROL XL) 25 MG 24 hr tablet     Sig: Take 1 tablet (25 mg) by mouth daily     Dispense:  90 tablet     Refill:  0     nitroGLYcerin (NITROSTAT) 0.4 MG sublingual tablet     Sig: Place 1 tablet (0.4 mg) under the tongue every  5 minutes as needed for chest pain     Dispense:  25 tablet     Refill:  11     prasugrel (EFFIENT) 10 MG TABS tablet     Sig: Take 1 tablet (10 mg) by mouth daily     Dispense:  90 tablet     Refill:  3     rosuvastatin (CRESTOR) 40 MG tablet     Sig: Take 1 tablet (40 mg) by mouth daily     Dispense:  90 tablet     Refill:  0     Medications Discontinued During This Encounter   Medication Reason     nitroGLYcerin (NITROSTAT) 0.4 MG sublingual tablet Reorder     prasugrel (EFFIENT) 10 MG TABS tablet Reorder     rosuvastatin (CRESTOR) 40 MG tablet Reorder     ezetimibe (ZETIA) 10 MG tablet Reorder     lisinopril (ZESTRIL) 20 MG tablet Reorder     metoprolol succinate ER (TOPROL XL) 25 MG 24 hr tablet Reorder         Encounter Diagnoses   Name Primary?     ST elevation myocardial infarction (STEMI), unspecified artery (H)      Coronary artery disease involving native coronary artery of native heart without angina pectoris      ST elevation myocardial infarction (STEMI) involving other coronary artery (H)      Benign hypertension      Hypercholesterolemia      Ischemic cardiomyopathy      Hyperlipidemia LDL goal <70        CURRENT MEDICATIONS:  Current Outpatient Medications   Medication Sig Dispense Refill     aspirin 81 MG tablet Take 1 tablet by mouth daily.       Cholecalciferol (VITAMIN D) 2000 UNITS CAPS Take 2,000 Units by mouth daily       ezetimibe (ZETIA) 10 MG tablet Take 1 tablet (10 mg) by mouth every evening 90 tablet 0     lisinopril (ZESTRIL) 20 MG tablet Take 1 tablet (20 mg) by mouth daily 90 tablet 0     metoprolol succinate ER (TOPROL XL) 25 MG 24 hr tablet Take 1 tablet (25 mg) by mouth daily 90 tablet 0     nitroGLYcerin (NITROSTAT) 0.4 MG sublingual tablet Place 1 tablet (0.4 mg) under the tongue every 5 minutes as needed for chest pain 25 tablet 11     prasugrel (EFFIENT) 10 MG TABS tablet Take 1 tablet (10 mg) by mouth daily 90 tablet 3     rosuvastatin (CRESTOR) 40 MG tablet Take 1 tablet (40  mg) by mouth daily 90 tablet 0     cinnamon 500 MG CAPS Take 500 mg by mouth daily         ALLERGIES   No Known Allergies    PAST MEDICAL HISTORY:  Past Medical History:   Diagnosis Date     Arthritis      Hyperlipemia      Hypertension      Myocardial infarction (H)     2011 STEMI     Post PTCA     JENNIFER to RCA 2011       PAST SURGICAL HISTORY:  Past Surgical History:   Procedure Laterality Date     COLONOSCOPY N/A 7/16/2020    Procedure: COLONOSCOPY, WITH POLYPECTOMY AND BIOPSY;  Surgeon: Luis Beach MD;  Location:  GI     CV CORONARY ANGIOGRAM N/A 5/15/2020    Procedure: Coronary Angiogram;  Surgeon: August Hartmann MD;  Location:  HEART CARDIAC CATH LAB     CV PCI STENT DRUG ELUTING N/A 5/15/2020    Procedure: Percutaneous Coronary Intervention Stent Drug Eluting;  Surgeon: August Hartmann MD;  Location:  HEART CARDIAC CATH LAB     HEART CATH, ANGIOPLASTY      JENNIFER RCA 2011, 70% stenosis LAD     ORTHOPEDIC SURGERY      right knee replacement       FAMILY HISTORY:  Family History   Problem Relation Age of Onset     Hypertension Brother      Hypertension Father      Cancer - colorectal Father      Cancer Mother         lung     Other - See Comments Brother         heamachromatosis     Coronary Artery Disease Brother 61        MI       SOCIAL HISTORY:  Social History     Socioeconomic History     Marital status:      Spouse name: None     Number of children: None     Years of education: None     Highest education level: None   Tobacco Use     Smoking status: Never Smoker     Smokeless tobacco: Never Used   Substance and Sexual Activity     Alcohol use: Not Currently     Alcohol/week: 1.0 standard drink     Types: 1 Cans of beer per week     Drug use: No   Other Topics Concern     Caffeine Concern Yes     Comment: ice tea 16oz     Special Diet No     Exercise Yes     Comment: biking, 10,000 steps daily      Seat Belt Yes     Parent/sibling w/ CABG, MI or angioplasty before 65F  "55M? No       Review of Systems:  Skin:        Eyes:  not assessed    ENT:  not assessed    Respiratory:  Negative    Cardiovascular:  Negative    Gastroenterology: not assessed    Genitourinary:  not assessed    Musculoskeletal:  not assessed    Neurologic:  not assessed    Psychiatric:  not assessed    Heme/Lymph/Imm:  Negative    Endocrine:  Negative      Physical Exam:  Vitals: /89 (BP Location: Right arm, Cuff Size: Adult Large)   Pulse 69   Ht 1.854 m (6' 1\")   Wt 92.5 kg (204 lb)   BMI 26.91 kg/m      Constitutional:           Skin:             Head:           Eyes:           Lymph:      ENT:           Neck:           Respiratory:            Cardiac:                                                           GI:           Extremities and Muscular Skeletal:                 Neurological:           Psych:         Recent Lab Results:  LIPID RESULTS:  Lab Results   Component Value Date    CHOL 147 05/29/2019    HDL 49 05/29/2019    LDL 71 05/29/2019    TRIG 136 05/29/2019    CHOLHDLRATIO 2.7 03/27/2015       LIVER ENZYME RESULTS:  Lab Results   Component Value Date    AST 28 10/21/2014    ALT 16 05/29/2019       CBC RESULTS:  Lab Results   Component Value Date    WBC 6.1 05/15/2020    RBC 4.72 05/15/2020    HGB 15.5 05/15/2020    HCT 45.7 05/15/2020    MCV 97 05/15/2020    MCH 32.8 05/15/2020    MCHC 33.9 05/15/2020    RDW 12.3 05/15/2020     05/15/2020       BMP RESULTS:  Lab Results   Component Value Date     05/15/2020    POTASSIUM 4.2 05/15/2020    CHLORIDE 105 05/15/2020    CO2 26 05/15/2020    ANIONGAP 6 05/15/2020     (H) 05/15/2020    BUN 27 05/15/2020    CR 1.46 (H) 05/15/2020    GFRESTIMATED 49 (L) 05/15/2020    GFRESTBLACK 57 (L) 05/15/2020    RYANNE 9.6 05/15/2020        A1C RESULTS:  No results found for: A1C    INR RESULTS:  Lab Results   Component Value Date    INR 1.02 05/15/2020    INR 0.85 (L) 12/19/2011           CC  August Hartmann MD  [id number]  [place] [id number]

## 2022-09-19 NOTE — LETTER
9/19/2022    Gustavo Neumann MD  [facility] [address]  [place] [address]    RE: Bridger Castillo       Dear Colleague,     I had the pleasure of seeing Bridger Jesus Castillo in the [facility].  HPI and Plan:     Sunny Kuo is a very pleasant 71-year-old previous history of ST elevation MI history of coronary intervention with JENNIFER to the RCA and mid LAD in 2020.  He has history of hypertension.  He has done well of late.  He is now working from home where he has since COVID therefore he does not bike anymore to work however he walks his dogs twice daily.  He has no symptoms of chest tightness chest pressure shortness of breath heart racing palpitation or edema.  Is been compliant with his medications.  We reviewed his laboratory results from [facility] showing an LDL cholesterol less than 186.  Blood pressures have been good.  His stress test from last year looked normal with no evidence of ischemia on echocardiogram and good exercise tolerance.    Recommend continued medical therapy.  We will follow-up with repeat stress test next year.  No changes necessary at this time    Today's clinic visit entailed:  Review of external notes as documented elsewhere in note  Review of the result(s) of each unique test - stress echocardiogram  Ordering of each unique test  No LOS data to display   Time spent doing chart review, history and exam, documentation and further activities per the note  Provider  Link to [facility] Help Grid     The level of medical decision making during this visit was of moderate complexity.      No orders of the defined types were placed in this encounter.    Orders Placed This Encounter   Medications     ezetimibe (ZETIA) 10 MG tablet     Sig: Take 1 tablet (10 mg) by mouth every evening     Dispense:  90 tablet     Refill:  0     lisinopril (ZESTRIL) 20 MG tablet     Sig: Take 1 tablet (20 mg) by mouth daily     Dispense:  90 tablet     Refill:  0     metoprolol succinate ER  (TOPROL XL) 25 MG 24 hr tablet     Sig: Take 1 tablet (25 mg) by mouth daily     Dispense:  90 tablet     Refill:  0     nitroGLYcerin (NITROSTAT) 0.4 MG sublingual tablet     Sig: Place 1 tablet (0.4 mg) under the tongue every 5 minutes as needed for chest pain     Dispense:  25 tablet     Refill:  11     prasugrel (EFFIENT) 10 MG TABS tablet     Sig: Take 1 tablet (10 mg) by mouth daily     Dispense:  90 tablet     Refill:  3     rosuvastatin (CRESTOR) 40 MG tablet     Sig: Take 1 tablet (40 mg) by mouth daily     Dispense:  90 tablet     Refill:  0     Medications Discontinued During This Encounter   Medication Reason     nitroGLYcerin (NITROSTAT) 0.4 MG sublingual tablet Reorder     prasugrel (EFFIENT) 10 MG TABS tablet Reorder     rosuvastatin (CRESTOR) 40 MG tablet Reorder     ezetimibe (ZETIA) 10 MG tablet Reorder     lisinopril (ZESTRIL) 20 MG tablet Reorder     metoprolol succinate ER (TOPROL XL) 25 MG 24 hr tablet Reorder         Encounter Diagnoses   Name Primary?     ST elevation myocardial infarction (STEMI), unspecified artery (H)      Coronary artery disease involving native coronary artery of native heart without angina pectoris      ST elevation myocardial infarction (STEMI) involving other coronary artery (H)      Benign hypertension      Hypercholesterolemia      Ischemic cardiomyopathy      Hyperlipidemia LDL goal <70        CURRENT MEDICATIONS:  Current Outpatient Medications   Medication Sig Dispense Refill     aspirin 81 MG tablet Take 1 tablet by mouth daily.       Cholecalciferol (VITAMIN D) 2000 UNITS CAPS Take 2,000 Units by mouth daily       ezetimibe (ZETIA) 10 MG tablet Take 1 tablet (10 mg) by mouth every evening 90 tablet 0     lisinopril (ZESTRIL) 20 MG tablet Take 1 tablet (20 mg) by mouth daily 90 tablet 0     metoprolol succinate ER (TOPROL XL) 25 MG 24 hr tablet Take 1 tablet (25 mg) by mouth daily 90 tablet 0     nitroGLYcerin (NITROSTAT) 0.4 MG sublingual tablet Place 1 tablet  (0.4 mg) under the tongue every 5 minutes as needed for chest pain 25 tablet 11     prasugrel (EFFIENT) 10 MG TABS tablet Take 1 tablet (10 mg) by mouth daily 90 tablet 3     rosuvastatin (CRESTOR) 40 MG tablet Take 1 tablet (40 mg) by mouth daily 90 tablet 0     cinnamon 500 MG CAPS Take 500 mg by mouth daily         ALLERGIES   No Known Allergies    PAST MEDICAL HISTORY:  Past Medical History:   Diagnosis Date     Arthritis      Hyperlipemia      Hypertension      Myocardial infarction (H)     2011 STEMI     Post PTCA     JENNIFER to RCA 2011       PAST SURGICAL HISTORY:  Past Surgical History:   Procedure Laterality Date     COLONOSCOPY N/A 7/16/2020    Procedure: COLONOSCOPY, WITH POLYPECTOMY AND BIOPSY;  Surgeon: Luis Beach MD;  Location:  GI     CV CORONARY ANGIOGRAM N/A 5/15/2020    Procedure: Coronary Angiogram;  Surgeon: August Hartmann MD;  Location:  HEART CARDIAC CATH LAB     CV PCI STENT DRUG ELUTING N/A 5/15/2020    Procedure: Percutaneous Coronary Intervention Stent Drug Eluting;  Surgeon: August Hartmann MD;  Location:  HEART CARDIAC CATH LAB     HEART CATH, ANGIOPLASTY      JENNIFER RCA 2011, 70% stenosis LAD     ORTHOPEDIC SURGERY      right knee replacement       FAMILY HISTORY:  Family History   Problem Relation Age of Onset     Hypertension Brother      Hypertension Father      Cancer - colorectal Father      Cancer Mother         lung     Other - See Comments Brother         heamachromatosis     Coronary Artery Disease Brother 61        MI       SOCIAL HISTORY:  Social History     Socioeconomic History     Marital status:      Spouse name: None     Number of children: None     Years of education: None     Highest education level: None   Tobacco Use     Smoking status: Never Smoker     Smokeless tobacco: Never Used   Substance and Sexual Activity     Alcohol use: Not Currently     Alcohol/week: 1.0 standard drink     Types: 1 Cans of beer per week     Drug use:  "No   Other Topics Concern     Caffeine Concern Yes     Comment: ice tea 16oz     Special Diet No     Exercise Yes     Comment: biking, 10,000 steps daily      Seat Belt Yes     Parent/sibling w/ CABG, MI or angioplasty before 65F 55M? No       Review of Systems:  Skin:        Eyes:  not assessed    ENT:  not assessed    Respiratory:  Negative    Cardiovascular:  Negative    Gastroenterology: not assessed    Genitourinary:  not assessed    Musculoskeletal:  not assessed    Neurologic:  not assessed    Psychiatric:  not assessed    Heme/Lymph/Imm:  Negative    Endocrine:  Negative      Physical Exam:  Vitals: /89 (BP Location: Right arm, Cuff Size: Adult Large)   Pulse 69   Ht 1.854 m (6' 1\")   Wt 92.5 kg (204 lb)   BMI 26.91 kg/m      Constitutional:           Skin:             Head:           Eyes:           Lymph:      ENT:           Neck:           Respiratory:            Cardiac:                                                           GI:           Extremities and Muscular Skeletal:                 Neurological:           Psych:         Recent Lab Results:  LIPID RESULTS:  Lab Results   Component Value Date    CHOL 147 05/29/2019    HDL 49 05/29/2019    LDL 71 05/29/2019    TRIG 136 05/29/2019    CHOLHDLRATIO 2.7 03/27/2015       LIVER ENZYME RESULTS:  Lab Results   Component Value Date    AST 28 10/21/2014    ALT 16 05/29/2019       CBC RESULTS:  Lab Results   Component Value Date    WBC 6.1 05/15/2020    RBC 4.72 05/15/2020    HGB 15.5 05/15/2020    HCT 45.7 05/15/2020    MCV 97 05/15/2020    MCH 32.8 05/15/2020    MCHC 33.9 05/15/2020    RDW 12.3 05/15/2020     05/15/2020       BMP RESULTS:  Lab Results   Component Value Date     05/15/2020    POTASSIUM 4.2 05/15/2020    CHLORIDE 105 05/15/2020    CO2 26 05/15/2020    ANIONGAP 6 05/15/2020     (H) 05/15/2020    BUN 27 05/15/2020    CR 1.46 (H) 05/15/2020    GFRESTIMATED 49 (L) 05/15/2020    GFRESTBLACK 57 (L) 05/15/2020    RYANNE " 9.6 05/15/2020      A1C RESULTS:  No results found for: A1C    INR RESULTS:  Lab Results   Component Value Date    INR 1.02 05/15/2020    INR 0.85 (L) 12/19/2011     CC  August Alvarez MD  [address]  [place] [address]    Thank you for allowing me to participate in the care of your patient.    Sincerely,   AUGUST ALVAREZ MD   [facility]

## 2022-11-20 ENCOUNTER — HEALTH MAINTENANCE LETTER (OUTPATIENT)
Age: 72
End: 2022-11-20

## 2023-02-15 DIAGNOSIS — I21.3 ST ELEVATION MYOCARDIAL INFARCTION (STEMI), UNSPECIFIED ARTERY (H): ICD-10-CM

## 2023-02-15 DIAGNOSIS — I25.10 CORONARY ARTERY DISEASE INVOLVING NATIVE CORONARY ARTERY OF NATIVE HEART WITHOUT ANGINA PECTORIS: ICD-10-CM

## 2023-02-15 DIAGNOSIS — E78.5 HYPERLIPIDEMIA LDL GOAL <70: ICD-10-CM

## 2023-02-15 RX ORDER — METOPROLOL SUCCINATE 25 MG/1
25 TABLET, EXTENDED RELEASE ORAL DAILY
Qty: 90 TABLET | Refills: 2 | Status: SHIPPED | OUTPATIENT
Start: 2023-02-15 | End: 2023-04-06

## 2023-02-15 RX ORDER — EZETIMIBE 10 MG/1
10 TABLET ORAL EVERY EVENING
Qty: 90 TABLET | Refills: 2 | Status: ON HOLD | OUTPATIENT
Start: 2023-02-15 | End: 2023-04-17

## 2023-02-15 RX ORDER — ROSUVASTATIN CALCIUM 40 MG/1
40 TABLET, COATED ORAL DAILY
Qty: 90 TABLET | Refills: 2 | Status: ON HOLD | OUTPATIENT
Start: 2023-02-15 | End: 2023-04-17

## 2023-02-15 RX ORDER — LISINOPRIL 20 MG/1
20 TABLET ORAL DAILY
Qty: 90 TABLET | Refills: 2 | Status: ON HOLD | OUTPATIENT
Start: 2023-02-15 | End: 2023-04-17

## 2023-02-15 NOTE — TELEPHONE ENCOUNTER
Received refill request for:  Zetia, Lisinopril, Metoprolol, Rosuvastatin    [facility] Cardiology Refill Guideline reviewed.  Medication meets criteria for refill.    Sun Rhoades RN, BSN  02/15/23 at 10:38 AM

## 2023-04-06 DIAGNOSIS — I25.10 CORONARY ARTERY DISEASE INVOLVING NATIVE CORONARY ARTERY OF NATIVE HEART WITHOUT ANGINA PECTORIS: ICD-10-CM

## 2023-04-06 RX ORDER — METOPROLOL SUCCINATE 25 MG/1
25 TABLET, EXTENDED RELEASE ORAL DAILY
Qty: 90 TABLET | Refills: 1 | Status: SHIPPED | OUTPATIENT
Start: 2023-04-06 | End: 2023-12-06

## 2023-04-12 ENCOUNTER — LAB REQUISITION (OUTPATIENT)
Dept: LAB | Facility: CLINIC | Age: 73
End: 2023-04-12
Payer: COMMERCIAL

## 2023-04-12 DIAGNOSIS — M25.561 PAIN IN RIGHT KNEE: ICD-10-CM

## 2023-04-12 LAB
APPEARANCE FLD: ABNORMAL
CELL COUNT BODY FLUID SOURCE: ABNORMAL
COLOR FLD: YELLOW
CRYSTALS SNV MICRO: NORMAL
EOSINOPHIL NFR FLD MANUAL: 2 %
GRAM STAIN RESULT: NORMAL
GRAM STAIN RESULT: NORMAL
LYMPHOCYTES NFR FLD MANUAL: 8 %
MONOS+MACROS NFR FLD MANUAL: NORMAL %
NEUTS BAND NFR FLD MANUAL: 90 %
WBC # FLD AUTO: ABNORMAL /UL

## 2023-04-12 PROCEDURE — 87075 CULTR BACTERIA EXCEPT BLOOD: CPT | Mod: ORL | Performed by: SPECIALIST/TECHNOLOGIST

## 2023-04-12 PROCEDURE — 87015 SPECIMEN INFECT AGNT CONCNTJ: CPT | Mod: ORL | Performed by: SPECIALIST/TECHNOLOGIST

## 2023-04-12 PROCEDURE — 87070 CULTURE OTHR SPECIMN AEROBIC: CPT | Mod: ORL | Performed by: SPECIALIST/TECHNOLOGIST

## 2023-04-12 PROCEDURE — 89060 EXAM SYNOVIAL FLUID CRYSTALS: CPT | Mod: ORL | Performed by: SPECIALIST/TECHNOLOGIST

## 2023-04-12 PROCEDURE — 89051 BODY FLUID CELL COUNT: CPT | Mod: ORL | Performed by: SPECIALIST/TECHNOLOGIST

## 2023-04-17 ENCOUNTER — ANESTHESIA (OUTPATIENT)
Dept: SURGERY | Facility: CLINIC | Age: 73
End: 2023-04-17
Payer: COMMERCIAL

## 2023-04-17 ENCOUNTER — ANESTHESIA EVENT (OUTPATIENT)
Dept: SURGERY | Facility: CLINIC | Age: 73
End: 2023-04-17
Payer: COMMERCIAL

## 2023-04-17 ENCOUNTER — HOSPITAL ENCOUNTER (INPATIENT)
Facility: CLINIC | Age: 73
LOS: 4 days | Discharge: HOME HEALTH | End: 2023-04-21
Attending: ORTHOPAEDIC SURGERY | Admitting: ORTHOPAEDIC SURGERY
Payer: COMMERCIAL

## 2023-04-17 ENCOUNTER — APPOINTMENT (OUTPATIENT)
Dept: GENERAL RADIOLOGY | Facility: CLINIC | Age: 73
End: 2023-04-17
Attending: SPECIALIST/TECHNOLOGIST
Payer: COMMERCIAL

## 2023-04-17 DIAGNOSIS — E78.5 HYPERLIPIDEMIA LDL GOAL <70: ICD-10-CM

## 2023-04-17 DIAGNOSIS — T84.53XD INFECTION OF TOTAL RIGHT KNEE REPLACEMENT, SUBSEQUENT ENCOUNTER: ICD-10-CM

## 2023-04-17 DIAGNOSIS — I25.10 CORONARY ARTERY DISEASE INVOLVING NATIVE CORONARY ARTERY OF NATIVE HEART WITHOUT ANGINA PECTORIS: ICD-10-CM

## 2023-04-17 DIAGNOSIS — Z89.529 ACQUIRED ABSENCE OF KNEE JOINT FOLLOWING EXPLANTATION OF JOINT PROSTHESIS WITH PRESENCE OF ANTIBIOTIC-IMPREGNATED CEMENT SPACER: Primary | ICD-10-CM

## 2023-04-17 DIAGNOSIS — I21.3 ST ELEVATION MYOCARDIAL INFARCTION (STEMI), UNSPECIFIED ARTERY (H): ICD-10-CM

## 2023-04-17 PROBLEM — T84.53XA INFECTION OF TOTAL RIGHT KNEE REPLACEMENT (H): Status: ACTIVE | Noted: 2023-04-17

## 2023-04-17 LAB
BACTERIA SNV CULT: NO GROWTH
CREAT SERPL-MCNC: 1.67 MG/DL (ref 0.67–1.17)
CRP SERPL-MCNC: 48.21 MG/L
ERYTHROCYTE [SEDIMENTATION RATE] IN BLOOD BY WESTERGREN METHOD: 48 MM/HR (ref 0–20)
GFR SERPL CREATININE-BSD FRML MDRD: 44 ML/MIN/1.73M2
GLUCOSE SERPL-MCNC: 103 MG/DL (ref 70–99)
GRAM STAIN RESULT: NORMAL
HGB BLD-MCNC: 14 G/DL (ref 13.3–17.7)

## 2023-04-17 PROCEDURE — 258N000003 HC RX IP 258 OP 636: Performed by: SPECIALIST/TECHNOLOGIST

## 2023-04-17 PROCEDURE — 250N000011 HC RX IP 250 OP 636

## 2023-04-17 PROCEDURE — 360N000078 HC SURGERY LEVEL 5, PER MIN: Performed by: ORTHOPAEDIC SURGERY

## 2023-04-17 PROCEDURE — 250N000009 HC RX 250: Performed by: ORTHOPAEDIC SURGERY

## 2023-04-17 PROCEDURE — 272N000001 HC OR GENERAL SUPPLY STERILE: Performed by: ORTHOPAEDIC SURGERY

## 2023-04-17 PROCEDURE — 0SHC08Z INSERTION OF SPACER INTO RIGHT KNEE JOINT, OPEN APPROACH: ICD-10-PCS | Performed by: ORTHOPAEDIC SURGERY

## 2023-04-17 PROCEDURE — 85652 RBC SED RATE AUTOMATED: CPT | Performed by: SPECIALIST/TECHNOLOGIST

## 2023-04-17 PROCEDURE — 87075 CULTR BACTERIA EXCEPT BLOOD: CPT | Performed by: ORTHOPAEDIC SURGERY

## 2023-04-17 PROCEDURE — 120N000001 HC R&B MED SURG/OB

## 2023-04-17 PROCEDURE — C1713 ANCHOR/SCREW BN/BN,TIS/BN: HCPCS | Performed by: ORTHOPAEDIC SURGERY

## 2023-04-17 PROCEDURE — 250N000013 HC RX MED GY IP 250 OP 250 PS 637: Performed by: SPECIALIST/TECHNOLOGIST

## 2023-04-17 PROCEDURE — 250N000013 HC RX MED GY IP 250 OP 250 PS 637: Performed by: ANESTHESIOLOGY

## 2023-04-17 PROCEDURE — 82947 ASSAY GLUCOSE BLOOD QUANT: CPT | Performed by: ANESTHESIOLOGY

## 2023-04-17 PROCEDURE — 250N000011 HC RX IP 250 OP 636: Performed by: ANESTHESIOLOGY

## 2023-04-17 PROCEDURE — 250N000013 HC RX MED GY IP 250 OP 250 PS 637: Performed by: PHYSICIAN ASSISTANT

## 2023-04-17 PROCEDURE — 87205 SMEAR GRAM STAIN: CPT | Performed by: ORTHOPAEDIC SURGERY

## 2023-04-17 PROCEDURE — 999N000141 HC STATISTIC PRE-PROCEDURE NURSING ASSESSMENT: Performed by: ORTHOPAEDIC SURGERY

## 2023-04-17 PROCEDURE — 710N000009 HC RECOVERY PHASE 1, LEVEL 1, PER MIN: Performed by: ORTHOPAEDIC SURGERY

## 2023-04-17 PROCEDURE — 0SRC0N9 REPLACEMENT OF RIGHT KNEE JOINT WITH PATELLOFEMORAL SYNTHETIC SUBSTITUTE, CEMENTED, OPEN APPROACH: ICD-10-PCS | Performed by: ORTHOPAEDIC SURGERY

## 2023-04-17 PROCEDURE — 82565 ASSAY OF CREATININE: CPT

## 2023-04-17 PROCEDURE — 999N000063 XR KNEE PORT RIGHT 1/2 VIEWS: Mod: RT

## 2023-04-17 PROCEDURE — 0S9C0ZZ DRAINAGE OF RIGHT KNEE JOINT, OPEN APPROACH: ICD-10-PCS | Performed by: ORTHOPAEDIC SURGERY

## 2023-04-17 PROCEDURE — 250N000009 HC RX 250

## 2023-04-17 PROCEDURE — 258N000001 HC RX 258: Performed by: ORTHOPAEDIC SURGERY

## 2023-04-17 PROCEDURE — C1776 JOINT DEVICE (IMPLANTABLE): HCPCS | Performed by: ORTHOPAEDIC SURGERY

## 2023-04-17 PROCEDURE — 370N000017 HC ANESTHESIA TECHNICAL FEE, PER MIN: Performed by: ORTHOPAEDIC SURGERY

## 2023-04-17 PROCEDURE — 36415 COLL VENOUS BLD VENIPUNCTURE: CPT

## 2023-04-17 PROCEDURE — 36415 COLL VENOUS BLD VENIPUNCTURE: CPT | Performed by: SPECIALIST/TECHNOLOGIST

## 2023-04-17 PROCEDURE — 250N000011 HC RX IP 250 OP 636: Performed by: ORTHOPAEDIC SURGERY

## 2023-04-17 PROCEDURE — 86140 C-REACTIVE PROTEIN: CPT | Performed by: SPECIALIST/TECHNOLOGIST

## 2023-04-17 PROCEDURE — 85018 HEMOGLOBIN: CPT

## 2023-04-17 PROCEDURE — 99207 PR NO BILLABLE SERVICE THIS VISIT: CPT | Performed by: PHYSICIAN ASSISTANT

## 2023-04-17 PROCEDURE — 250N000013 HC RX MED GY IP 250 OP 250 PS 637

## 2023-04-17 PROCEDURE — 258N000003 HC RX IP 258 OP 636: Performed by: ORTHOPAEDIC SURGERY

## 2023-04-17 PROCEDURE — 87176 TISSUE HOMOGENIZATION CULTR: CPT | Performed by: ORTHOPAEDIC SURGERY

## 2023-04-17 PROCEDURE — 258N000003 HC RX IP 258 OP 636: Performed by: ANESTHESIOLOGY

## 2023-04-17 PROCEDURE — 250N000025 HC SEVOFLURANE, PER MIN: Performed by: ORTHOPAEDIC SURGERY

## 2023-04-17 PROCEDURE — 87070 CULTURE OTHR SPECIMN AEROBIC: CPT | Performed by: ORTHOPAEDIC SURGERY

## 2023-04-17 DEVICE — FULL DOSE BONE CEMENT, 10 PACK CATALOG NUMBER IS 6191-1-010
Type: IMPLANTABLE DEVICE | Site: KNEE | Status: FUNCTIONAL
Brand: SIMPLEX

## 2023-04-17 DEVICE — TIBIAL BEARING INSERT - CS
Type: IMPLANTABLE DEVICE | Site: KNEE | Status: FUNCTIONAL
Brand: TRIATHLON

## 2023-04-17 DEVICE — BONE CEMENT SIMPLEX 1/2 DOSE 6188-1-001: Type: IMPLANTABLE DEVICE | Site: KNEE | Status: FUNCTIONAL

## 2023-04-17 DEVICE — CRUCIATE RETAINING FEMORAL
Type: IMPLANTABLE DEVICE | Site: KNEE | Status: FUNCTIONAL
Brand: TRIATHLON

## 2023-04-17 RX ORDER — ONDANSETRON 4 MG/1
4 TABLET, ORALLY DISINTEGRATING ORAL EVERY 30 MIN PRN
Status: DISCONTINUED | OUTPATIENT
Start: 2023-04-17 | End: 2023-04-17 | Stop reason: HOSPADM

## 2023-04-17 RX ORDER — MINERAL OIL
OIL (ML) MISCELLANEOUS PRN
Status: DISCONTINUED | OUTPATIENT
Start: 2023-04-17 | End: 2023-04-17 | Stop reason: HOSPADM

## 2023-04-17 RX ORDER — LISINOPRIL 20 MG/1
20 TABLET ORAL DAILY
Qty: 90 TABLET | Refills: 2 | Status: SHIPPED | OUTPATIENT
Start: 2023-04-17 | End: 2023-05-09

## 2023-04-17 RX ORDER — HYDROMORPHONE HCL IN WATER/PF 6 MG/30 ML
0.4 PATIENT CONTROLLED ANALGESIA SYRINGE INTRAVENOUS
Status: DISCONTINUED | OUTPATIENT
Start: 2023-04-17 | End: 2023-04-21 | Stop reason: HOSPADM

## 2023-04-17 RX ORDER — PROPOFOL 10 MG/ML
INJECTION, EMULSION INTRAVENOUS CONTINUOUS PRN
Status: DISCONTINUED | OUTPATIENT
Start: 2023-04-17 | End: 2023-04-17

## 2023-04-17 RX ORDER — HYDROXYZINE HYDROCHLORIDE 25 MG/1
50 TABLET, FILM COATED ORAL ONCE
Status: COMPLETED | OUTPATIENT
Start: 2023-04-17 | End: 2023-04-17

## 2023-04-17 RX ORDER — FENTANYL CITRATE 0.05 MG/ML
25 INJECTION, SOLUTION INTRAMUSCULAR; INTRAVENOUS EVERY 5 MIN PRN
Status: DISCONTINUED | OUTPATIENT
Start: 2023-04-17 | End: 2023-04-17 | Stop reason: HOSPADM

## 2023-04-17 RX ORDER — NITROGLYCERIN 0.4 MG/1
0.4 TABLET SUBLINGUAL EVERY 5 MIN PRN
Status: DISCONTINUED | OUTPATIENT
Start: 2023-04-17 | End: 2023-04-21 | Stop reason: HOSPADM

## 2023-04-17 RX ORDER — TRANEXAMIC ACID 650 MG/1
1950 TABLET ORAL ONCE
Status: COMPLETED | OUTPATIENT
Start: 2023-04-17 | End: 2023-04-17

## 2023-04-17 RX ORDER — ROSUVASTATIN CALCIUM 40 MG/1
40 TABLET, COATED ORAL DAILY
Qty: 90 TABLET | Refills: 2 | Status: SHIPPED | OUTPATIENT
Start: 2023-04-17 | End: 2023-09-27

## 2023-04-17 RX ORDER — ONDANSETRON 2 MG/ML
4 INJECTION INTRAMUSCULAR; INTRAVENOUS EVERY 6 HOURS PRN
Status: DISCONTINUED | OUTPATIENT
Start: 2023-04-17 | End: 2023-04-21 | Stop reason: HOSPADM

## 2023-04-17 RX ORDER — FENTANYL CITRATE 50 UG/ML
INJECTION, SOLUTION INTRAMUSCULAR; INTRAVENOUS PRN
Status: DISCONTINUED | OUTPATIENT
Start: 2023-04-17 | End: 2023-04-17

## 2023-04-17 RX ORDER — CEFAZOLIN SODIUM/WATER 2 G/20 ML
2 SYRINGE (ML) INTRAVENOUS
Status: COMPLETED | OUTPATIENT
Start: 2023-04-17 | End: 2023-04-17

## 2023-04-17 RX ORDER — HYDROMORPHONE HCL IN WATER/PF 6 MG/30 ML
0.2 PATIENT CONTROLLED ANALGESIA SYRINGE INTRAVENOUS EVERY 5 MIN PRN
Status: DISCONTINUED | OUTPATIENT
Start: 2023-04-17 | End: 2023-04-17 | Stop reason: HOSPADM

## 2023-04-17 RX ORDER — POLYETHYLENE GLYCOL 3350 17 G/17G
17 POWDER, FOR SOLUTION ORAL DAILY
Status: DISCONTINUED | OUTPATIENT
Start: 2023-04-18 | End: 2023-04-21 | Stop reason: HOSPADM

## 2023-04-17 RX ORDER — ROSUVASTATIN CALCIUM 20 MG/1
40 TABLET, COATED ORAL DAILY
Status: DISCONTINUED | OUTPATIENT
Start: 2023-04-17 | End: 2023-04-21 | Stop reason: HOSPADM

## 2023-04-17 RX ORDER — OXYCODONE HYDROCHLORIDE 5 MG/1
5 TABLET ORAL EVERY 4 HOURS PRN
Status: DISCONTINUED | OUTPATIENT
Start: 2023-04-17 | End: 2023-04-21 | Stop reason: HOSPADM

## 2023-04-17 RX ORDER — ACETAMINOPHEN 325 MG/1
975 TABLET ORAL EVERY 8 HOURS
Status: COMPLETED | OUTPATIENT
Start: 2023-04-17 | End: 2023-04-20

## 2023-04-17 RX ORDER — HYDROMORPHONE HCL IN WATER/PF 6 MG/30 ML
0.4 PATIENT CONTROLLED ANALGESIA SYRINGE INTRAVENOUS EVERY 5 MIN PRN
Status: DISCONTINUED | OUTPATIENT
Start: 2023-04-17 | End: 2023-04-17 | Stop reason: HOSPADM

## 2023-04-17 RX ORDER — OXYCODONE HYDROCHLORIDE 5 MG/1
10 TABLET ORAL EVERY 4 HOURS PRN
Status: DISCONTINUED | OUTPATIENT
Start: 2023-04-17 | End: 2023-04-21 | Stop reason: HOSPADM

## 2023-04-17 RX ORDER — NALOXONE HYDROCHLORIDE 0.4 MG/ML
0.4 INJECTION, SOLUTION INTRAMUSCULAR; INTRAVENOUS; SUBCUTANEOUS
Status: DISCONTINUED | OUTPATIENT
Start: 2023-04-17 | End: 2023-04-21 | Stop reason: HOSPADM

## 2023-04-17 RX ORDER — FAMOTIDINE 20 MG/1
20 TABLET, FILM COATED ORAL DAILY
Status: DISCONTINUED | OUTPATIENT
Start: 2023-04-17 | End: 2023-04-19

## 2023-04-17 RX ORDER — ONDANSETRON 2 MG/ML
INJECTION INTRAMUSCULAR; INTRAVENOUS PRN
Status: DISCONTINUED | OUTPATIENT
Start: 2023-04-17 | End: 2023-04-17

## 2023-04-17 RX ORDER — ONDANSETRON 4 MG/1
4 TABLET, ORALLY DISINTEGRATING ORAL EVERY 6 HOURS PRN
Status: DISCONTINUED | OUTPATIENT
Start: 2023-04-17 | End: 2023-04-21 | Stop reason: HOSPADM

## 2023-04-17 RX ORDER — PROCHLORPERAZINE MALEATE 5 MG
5 TABLET ORAL EVERY 6 HOURS PRN
Status: DISCONTINUED | OUTPATIENT
Start: 2023-04-17 | End: 2023-04-21 | Stop reason: HOSPADM

## 2023-04-17 RX ORDER — SODIUM CHLORIDE, SODIUM LACTATE, POTASSIUM CHLORIDE, CALCIUM CHLORIDE 600; 310; 30; 20 MG/100ML; MG/100ML; MG/100ML; MG/100ML
INJECTION, SOLUTION INTRAVENOUS CONTINUOUS
Status: DISCONTINUED | OUTPATIENT
Start: 2023-04-17 | End: 2023-04-21 | Stop reason: HOSPADM

## 2023-04-17 RX ORDER — CEFAZOLIN SODIUM/WATER 2 G/20 ML
2 SYRINGE (ML) INTRAVENOUS SEE ADMIN INSTRUCTIONS
Status: DISCONTINUED | OUTPATIENT
Start: 2023-04-17 | End: 2023-04-17 | Stop reason: HOSPADM

## 2023-04-17 RX ORDER — LIDOCAINE HYDROCHLORIDE 20 MG/ML
INJECTION, SOLUTION INFILTRATION; PERINEURAL PRN
Status: DISCONTINUED | OUTPATIENT
Start: 2023-04-17 | End: 2023-04-17

## 2023-04-17 RX ORDER — ACETAMINOPHEN 325 MG/1
650 TABLET ORAL EVERY 4 HOURS PRN
Status: DISCONTINUED | OUTPATIENT
Start: 2023-04-20 | End: 2023-04-21 | Stop reason: HOSPADM

## 2023-04-17 RX ORDER — NALOXONE HYDROCHLORIDE 0.4 MG/ML
0.2 INJECTION, SOLUTION INTRAMUSCULAR; INTRAVENOUS; SUBCUTANEOUS
Status: DISCONTINUED | OUTPATIENT
Start: 2023-04-17 | End: 2023-04-21 | Stop reason: HOSPADM

## 2023-04-17 RX ORDER — ONDANSETRON 2 MG/ML
4 INJECTION INTRAMUSCULAR; INTRAVENOUS EVERY 30 MIN PRN
Status: DISCONTINUED | OUTPATIENT
Start: 2023-04-17 | End: 2023-04-17 | Stop reason: HOSPADM

## 2023-04-17 RX ORDER — ACETAMINOPHEN 325 MG/1
975 TABLET ORAL ONCE
Status: COMPLETED | OUTPATIENT
Start: 2023-04-17 | End: 2023-04-17

## 2023-04-17 RX ORDER — SODIUM CHLORIDE, SODIUM LACTATE, POTASSIUM CHLORIDE, CALCIUM CHLORIDE 600; 310; 30; 20 MG/100ML; MG/100ML; MG/100ML; MG/100ML
INJECTION, SOLUTION INTRAVENOUS CONTINUOUS
Status: DISCONTINUED | OUTPATIENT
Start: 2023-04-17 | End: 2023-04-17 | Stop reason: HOSPADM

## 2023-04-17 RX ORDER — PRASUGREL 10 MG/1
10 TABLET, FILM COATED ORAL DAILY
Qty: 90 TABLET | Refills: 2 | Status: SHIPPED | OUTPATIENT
Start: 2023-04-17 | End: 2024-03-11

## 2023-04-17 RX ORDER — BISACODYL 10 MG
10 SUPPOSITORY, RECTAL RECTAL DAILY PRN
Status: DISCONTINUED | OUTPATIENT
Start: 2023-04-17 | End: 2023-04-21 | Stop reason: HOSPADM

## 2023-04-17 RX ORDER — PROPOFOL 10 MG/ML
INJECTION, EMULSION INTRAVENOUS PRN
Status: DISCONTINUED | OUTPATIENT
Start: 2023-04-17 | End: 2023-04-17

## 2023-04-17 RX ORDER — HYDROMORPHONE HCL IN WATER/PF 6 MG/30 ML
0.2 PATIENT CONTROLLED ANALGESIA SYRINGE INTRAVENOUS
Status: DISCONTINUED | OUTPATIENT
Start: 2023-04-17 | End: 2023-04-21 | Stop reason: HOSPADM

## 2023-04-17 RX ORDER — EZETIMIBE 10 MG/1
10 TABLET ORAL EVERY EVENING
Status: DISCONTINUED | OUTPATIENT
Start: 2023-04-17 | End: 2023-04-21 | Stop reason: HOSPADM

## 2023-04-17 RX ORDER — VANCOMYCIN HYDROCHLORIDE 1 G/20ML
INJECTION, POWDER, LYOPHILIZED, FOR SOLUTION INTRAVENOUS PRN
Status: DISCONTINUED | OUTPATIENT
Start: 2023-04-17 | End: 2023-04-17 | Stop reason: HOSPADM

## 2023-04-17 RX ORDER — TOBRAMYCIN 1.2 G/30ML
INJECTION, POWDER, LYOPHILIZED, FOR SOLUTION INTRAVENOUS PRN
Status: DISCONTINUED | OUTPATIENT
Start: 2023-04-17 | End: 2023-04-17 | Stop reason: HOSPADM

## 2023-04-17 RX ORDER — AMOXICILLIN 250 MG
1 CAPSULE ORAL 2 TIMES DAILY
Status: DISCONTINUED | OUTPATIENT
Start: 2023-04-17 | End: 2023-04-21 | Stop reason: HOSPADM

## 2023-04-17 RX ORDER — METOPROLOL SUCCINATE 25 MG/1
25 TABLET, EXTENDED RELEASE ORAL DAILY
Status: DISCONTINUED | OUTPATIENT
Start: 2023-04-18 | End: 2023-04-21 | Stop reason: HOSPADM

## 2023-04-17 RX ORDER — LIDOCAINE 40 MG/G
CREAM TOPICAL
Status: DISCONTINUED | OUTPATIENT
Start: 2023-04-17 | End: 2023-04-21 | Stop reason: HOSPADM

## 2023-04-17 RX ORDER — CEFAZOLIN SODIUM 2 G/100ML
2 INJECTION, SOLUTION INTRAVENOUS EVERY 8 HOURS
Status: COMPLETED | OUTPATIENT
Start: 2023-04-18 | End: 2023-04-19

## 2023-04-17 RX ORDER — LABETALOL HYDROCHLORIDE 5 MG/ML
10 INJECTION, SOLUTION INTRAVENOUS
Status: DISCONTINUED | OUTPATIENT
Start: 2023-04-17 | End: 2023-04-17 | Stop reason: HOSPADM

## 2023-04-17 RX ORDER — FENTANYL CITRATE 0.05 MG/ML
50 INJECTION, SOLUTION INTRAMUSCULAR; INTRAVENOUS EVERY 5 MIN PRN
Status: DISCONTINUED | OUTPATIENT
Start: 2023-04-17 | End: 2023-04-17 | Stop reason: HOSPADM

## 2023-04-17 RX ORDER — MAGNESIUM HYDROXIDE 1200 MG/15ML
LIQUID ORAL PRN
Status: DISCONTINUED | OUTPATIENT
Start: 2023-04-17 | End: 2023-04-17 | Stop reason: HOSPADM

## 2023-04-17 RX ORDER — ASPIRIN 81 MG/1
81 TABLET ORAL 2 TIMES DAILY
Status: DISCONTINUED | OUTPATIENT
Start: 2023-04-17 | End: 2023-04-18

## 2023-04-17 RX ORDER — DEXAMETHASONE SODIUM PHOSPHATE 4 MG/ML
INJECTION, SOLUTION INTRA-ARTICULAR; INTRALESIONAL; INTRAMUSCULAR; INTRAVENOUS; SOFT TISSUE PRN
Status: DISCONTINUED | OUTPATIENT
Start: 2023-04-17 | End: 2023-04-17

## 2023-04-17 RX ORDER — EZETIMIBE 10 MG/1
10 TABLET ORAL EVERY EVENING
Qty: 90 TABLET | Refills: 2 | Status: SHIPPED | OUTPATIENT
Start: 2023-04-17 | End: 2023-10-02

## 2023-04-17 RX ADMIN — PROPOFOL 170 MG: 10 INJECTION, EMULSION INTRAVENOUS at 12:58

## 2023-04-17 RX ADMIN — SODIUM CHLORIDE, POTASSIUM CHLORIDE, SODIUM LACTATE AND CALCIUM CHLORIDE: 600; 310; 30; 20 INJECTION, SOLUTION INTRAVENOUS at 12:53

## 2023-04-17 RX ADMIN — MIDAZOLAM 2 MG: 1 INJECTION INTRAMUSCULAR; INTRAVENOUS at 12:52

## 2023-04-17 RX ADMIN — ASPIRIN 81 MG: 81 TABLET, COATED ORAL at 21:21

## 2023-04-17 RX ADMIN — HYDROMORPHONE HYDROCHLORIDE 0.5 MG: 1 INJECTION, SOLUTION INTRAMUSCULAR; INTRAVENOUS; SUBCUTANEOUS at 16:30

## 2023-04-17 RX ADMIN — HYDROMORPHONE HYDROCHLORIDE 0.4 MG: 0.2 INJECTION, SOLUTION INTRAMUSCULAR; INTRAVENOUS; SUBCUTANEOUS at 19:36

## 2023-04-17 RX ADMIN — PROPOFOL 30 MG: 10 INJECTION, EMULSION INTRAVENOUS at 13:41

## 2023-04-17 RX ADMIN — Medication 2 G: at 17:00

## 2023-04-17 RX ADMIN — HYDROXYZINE HYDROCHLORIDE 50 MG: 25 TABLET, FILM COATED ORAL at 19:02

## 2023-04-17 RX ADMIN — SODIUM CHLORIDE, POTASSIUM CHLORIDE, SODIUM LACTATE AND CALCIUM CHLORIDE: 600; 310; 30; 20 INJECTION, SOLUTION INTRAVENOUS at 14:10

## 2023-04-17 RX ADMIN — HYDROMORPHONE HYDROCHLORIDE 0.5 MG: 1 INJECTION, SOLUTION INTRAMUSCULAR; INTRAVENOUS; SUBCUTANEOUS at 13:54

## 2023-04-17 RX ADMIN — TRANEXAMIC ACID 1950 MG: 650 TABLET ORAL at 10:37

## 2023-04-17 RX ADMIN — FENTANYL CITRATE 50 MCG: 50 INJECTION, SOLUTION INTRAMUSCULAR; INTRAVENOUS at 13:41

## 2023-04-17 RX ADMIN — HYDROMORPHONE HYDROCHLORIDE 0.4 MG: 0.2 INJECTION, SOLUTION INTRAMUSCULAR; INTRAVENOUS; SUBCUTANEOUS at 19:10

## 2023-04-17 RX ADMIN — DEXAMETHASONE SODIUM PHOSPHATE 10 MG: 4 INJECTION, SOLUTION INTRA-ARTICULAR; INTRALESIONAL; INTRAMUSCULAR; INTRAVENOUS; SOFT TISSUE at 13:03

## 2023-04-17 RX ADMIN — FENTANYL CITRATE 50 MCG: 50 INJECTION, SOLUTION INTRAMUSCULAR; INTRAVENOUS at 12:58

## 2023-04-17 RX ADMIN — ONDANSETRON 4 MG: 2 INJECTION INTRAMUSCULAR; INTRAVENOUS at 16:30

## 2023-04-17 RX ADMIN — HYDROMORPHONE HYDROCHLORIDE 0.5 MG: 1 INJECTION, SOLUTION INTRAMUSCULAR; INTRAVENOUS; SUBCUTANEOUS at 14:08

## 2023-04-17 RX ADMIN — FAMOTIDINE 20 MG: 20 TABLET ORAL at 21:21

## 2023-04-17 RX ADMIN — PROPOFOL 30 MCG/KG/MIN: 10 INJECTION, EMULSION INTRAVENOUS at 13:08

## 2023-04-17 RX ADMIN — ROSUVASTATIN CALCIUM 40 MG: 20 TABLET, FILM COATED ORAL at 21:21

## 2023-04-17 RX ADMIN — EZETIMIBE 10 MG: 10 TABLET ORAL at 21:21

## 2023-04-17 RX ADMIN — SENNOSIDES AND DOCUSATE SODIUM 1 TABLET: 50; 8.6 TABLET ORAL at 21:21

## 2023-04-17 RX ADMIN — HYDROMORPHONE HYDROCHLORIDE 0.4 MG: 0.2 INJECTION, SOLUTION INTRAMUSCULAR; INTRAVENOUS; SUBCUTANEOUS at 18:50

## 2023-04-17 RX ADMIN — Medication 2 G: at 13:00

## 2023-04-17 RX ADMIN — ACETAMINOPHEN 975 MG: 325 TABLET ORAL at 21:21

## 2023-04-17 RX ADMIN — HYDROMORPHONE HYDROCHLORIDE 0.4 MG: 0.2 INJECTION, SOLUTION INTRAMUSCULAR; INTRAVENOUS; SUBCUTANEOUS at 18:30

## 2023-04-17 RX ADMIN — ACETAMINOPHEN 975 MG: 325 TABLET ORAL at 10:38

## 2023-04-17 RX ADMIN — SODIUM CHLORIDE, POTASSIUM CHLORIDE, SODIUM LACTATE AND CALCIUM CHLORIDE: 600; 310; 30; 20 INJECTION, SOLUTION INTRAVENOUS at 20:26

## 2023-04-17 RX ADMIN — FENTANYL CITRATE 50 MCG: 50 INJECTION, SOLUTION INTRAMUSCULAR; INTRAVENOUS at 18:02

## 2023-04-17 RX ADMIN — FENTANYL CITRATE 50 MCG: 50 INJECTION, SOLUTION INTRAMUSCULAR; INTRAVENOUS at 17:53

## 2023-04-17 RX ADMIN — VANCOMYCIN HYDROCHLORIDE 1250 MG: 10 INJECTION, POWDER, LYOPHILIZED, FOR SOLUTION INTRAVENOUS at 23:14

## 2023-04-17 RX ADMIN — LIDOCAINE HYDROCHLORIDE 20 MG: 20 INJECTION, SOLUTION INFILTRATION; PERINEURAL at 12:58

## 2023-04-17 ASSESSMENT — ACTIVITIES OF DAILY LIVING (ADL)
ADLS_ACUITY_SCORE: 22
ADLS_ACUITY_SCORE: 37

## 2023-04-17 NOTE — INTERVAL H&P NOTE
I have reviewed the surgical (or preoperative) H&P that is linked to this encounter, and examined the patient. There are no significant changes    Clinical Conditions Present on Arrival:  Clinically Significant Risk Factors Present on Admission                 # Drug Induced Platelet Defect: home medication list includes an antiplatelet medication

## 2023-04-17 NOTE — OR NURSING
Femoral component, patella, tibial insert, and tibial tray explanted by surgeon.  No record of the said implants were on file.

## 2023-04-17 NOTE — PROGRESS NOTES
[facility]  Consult Note - Hospitalist Service      Date of Admission:  4/17/2023  Consult Requested by: Orthopedics    Assessment & Plan   Bridger Castillo is a 72 year old male with PMH significant for hypertension, dyslipidemia, CAD with prior MI and stent placement, CKD stage III, ischemic cardiomyopathy, prior right knee replacement in 2014 who was admitted to [facility] on 4/17/2023 by the orthopedic service for total right knee arthroplasty removal, replacement with antibiotic spacer for INFECTED right TKA with Dr. Dominique.  No immediate postoperative complications, EBL25 mL. Preoperative H&P reviewed.    Given the above, will defer formal consult. Routine post-operative cares, IVF, DVT prophylaxis, and pain management to orthopedic service. Will check some basic lab work in the AM to assess for acute blood loss anemia given surgery. PT and OT to assess per Ortho. Bowel regimen in place while on pain regimen. No changes to PTA medication regimen at discharge unless issues arise throughout stay.      No charge note.    FLYNN Gold PATariC  Hospitalist CLIFF  [facility]  Securely message with the Vocera Web Console (learn more here)  Text page via [facility] Paging/Directory

## 2023-04-17 NOTE — PROCEDURES
POST OPERATIVE NOTE-IMMEDIATE :    Preoperative Diagnosis:  Infected Right TKA    Postoperative Diagnosis:  same    Procedures:  Rt knee I and D with removal of prosthesis   Placement of Right knee antibiotic spacer    Prosthetic Devices:  See dictated operative report    Surgeon(s) and Assistants (if any):    Surgeon(s):  Nikole Randall PA-C Anseth, Scott Duane, MD    Anesthesia:  General    Drains:  none    Specimens:  Multiple tissue samples sent to Microbiology for anaerobic and aerobic cultures.  To be held for 14 days    Complications:  none    Findings/Conclusions:  See dictated operative report    Estimated Blood Loss: 25 mL       Condition on discharge from OR:  Satisfactory    Plan:   1. Antibiotic Prophylaxis: ancef and vancomycin.   2. DVT prophylaxis aspirin and sequential compression devices will be started within 24hrs of surgery.  3. Weight Bearing TDWB on the right lower extremity when in the brace  4. Discharge anticipated date when medically stable and antibiotic therapy is organized. Plan home with home care  5. Pain Medication : Oxycodone and Tylenol  6. Prevena wound Vac for 14 days  7. ID consult for help with infected TKA  8. Hinge range of motion knee brace locked in extension for now.  9. Follow up in 10-14 days with Dr. Dominique  10. See dictated operative report.    Scott Duane Anseth, MD    @C(1)@ [facility]  [phone]

## 2023-04-17 NOTE — ANESTHESIA PREPROCEDURE EVALUATION
Anesthesia Pre-Procedure Evaluation    Patient: Bridger Castillo   MRN: [id number] : 1950        Procedure : Procedure(s):  Right Total Knee Arthroplasty Removal, Replacement with Antibiotic Spacer          Past Medical History:   Diagnosis Date     Arthritis      Hyperlipemia      Hypertension      Myocardial infarction (H)      STEMI     Post PTCA     JENNIFER to RCA  stent   different artery      Past Surgical History:   Procedure Laterality Date     COLONOSCOPY N/A 2020    Procedure: COLONOSCOPY, WITH POLYPECTOMY AND BIOPSY;  Surgeon: Luis Beach MD;  Location:  GI     CV CORONARY ANGIOGRAM N/A 5/15/2020    Procedure: Coronary Angiogram;  Surgeon: August Hartmann MD;  Location:  HEART CARDIAC CATH LAB     CV PCI STENT DRUG ELUTING N/A 5/15/2020    Procedure: Percutaneous Coronary Intervention Stent Drug Eluting;  Surgeon: August Hartmann MD;  Location:  HEART CARDIAC CATH LAB     HEART CATH, ANGIOPLASTY      JENNIFER RCA , 70% stenosis LAD     ORTHOPEDIC SURGERY      right knee replacement      No Known Allergies   Social History     Tobacco Use     Smoking status: Never     Smokeless tobacco: Never   Vaping Use     Vaping status: Not on file   Substance Use Topics     Alcohol use: Never     Alcohol/week: 1.0 standard drink of alcohol     Types: 1 Cans of beer per week      Wt Readings from Last 1 Encounters:   23 84.2 kg (185 lb 11.2 oz)        Anesthesia Evaluation            ROS/MED HX  ENT/Pulmonary:    (-) sleep apnea   Neurologic:       Cardiovascular:     (+) Dyslipidemia hypertension--CAD -past MI -stent-Previous cardiac testing   Echo: Date: 2021 Results:  Interpretation Summary  1. Below average exercise capacity, 106% max HR achieved.  2. The patient did not exhibit any symptoms during exercise.  3. 1mm ST depression of inferior and lateral leads.  4. Normal left ventricular function and wall motion at rest. The visual  ejection  fraction is estimated at 55-60%.  5. Stress echo: This was a normal stress echocardiogram with no evidence of  stress-induced ischemia. The visual ejection fraction is estimated at 60-65%.     Stress echo from 5/2020 showed anterior ischemia; the above EKG changes are  unchanged when directly compared to previous    Stress Test: Date: Results:    ECG Reviewed: Date: Results:    Cath: Date: Results:      METS/Exercise Tolerance:     Hematologic:       Musculoskeletal:       GI/Hepatic:    (-) GERD   Renal/Genitourinary:     (+) renal disease, type: CRI,     Endo:       Psychiatric/Substance Use:       Infectious Disease:       Malignancy:       Other:            Physical Exam    Airway        Mallampati: I   TM distance: > 3 FB   Neck ROM: full   Mouth opening: > 3 cm    Respiratory Devices and Support         Dental       (+) Minor Abnormalities - some fillings, tiny chips      Cardiovascular   cardiovascular exam normal          Pulmonary   pulmonary exam normal                OUTSIDE LABS:  CBC:   Lab Results   Component Value Date    WBC 6.1 05/15/2020    WBC 12.5 (H) 12/20/2011    HGB 14.0 04/17/2023    HGB 15.5 05/15/2020    HCT 45.7 05/15/2020    HCT 44.3 12/20/2011     05/15/2020     12/20/2011     BMP:   Lab Results   Component Value Date     05/15/2020     10/21/2014    POTASSIUM 4.2 05/15/2020    POTASSIUM 4.4 10/21/2014    CHLORIDE 105 05/15/2020    CHLORIDE 104 10/21/2014    CO2 26 05/15/2020    CO2 24 12/20/2011    BUN 27 05/15/2020    BUN 20 10/21/2014    CR 1.67 (H) 04/17/2023    CR 1.46 (H) 05/15/2020     (H) 04/17/2023     (H) 05/15/2020     COAGS:   Lab Results   Component Value Date    PTT 31 05/15/2020    INR 1.02 05/15/2020     POC:   Lab Results   Component Value Date     (H) 05/30/2010     HEPATIC:   Lab Results   Component Value Date    PROTTOTAL 7.6 10/21/2014    ALT 16 05/29/2019    AST 28 10/21/2014    ALKPHOS 64 10/21/2014    BILITOTAL 1.2  10/21/2014     OTHER:   Lab Results   Component Value Date    RYANNE 9.6 05/15/2020       Anesthesia Plan    ASA Status:  3   NPO Status:  NPO Appropriate    Anesthesia Type: General.     - Airway: LMA   Induction: Intravenous.   Maintenance: Balanced.        Consents    Anesthesia Plan(s) and associated risks, benefits, and realistic alternatives discussed. Questions answered and patient/representative(s) expressed understanding.    - Discussed:     - Discussed with:  Patient         Postoperative Care    Pain management: IV analgesics.   PONV prophylaxis: Ondansetron (or other 5HT-3), Dexamethasone or Solumedrol, Background Propofol Infusion     Comments:                MALISSA MORRIS MD

## 2023-04-18 ENCOUNTER — APPOINTMENT (OUTPATIENT)
Dept: PHYSICAL THERAPY | Facility: CLINIC | Age: 73
End: 2023-04-18
Attending: ORTHOPAEDIC SURGERY
Payer: COMMERCIAL

## 2023-04-18 ENCOUNTER — APPOINTMENT (OUTPATIENT)
Dept: OCCUPATIONAL THERAPY | Facility: CLINIC | Age: 73
End: 2023-04-18
Attending: ORTHOPAEDIC SURGERY
Payer: COMMERCIAL

## 2023-04-18 LAB
ANION GAP SERPL CALCULATED.3IONS-SCNC: 12 MMOL/L (ref 7–15)
BASOPHILS # BLD AUTO: 0 10E3/UL (ref 0–0.2)
BASOPHILS NFR BLD AUTO: 0 %
BUN SERPL-MCNC: 21.3 MG/DL (ref 8–23)
CALCIUM SERPL-MCNC: 9.3 MG/DL (ref 8.8–10.2)
CHLORIDE SERPL-SCNC: 98 MMOL/L (ref 98–107)
CREAT SERPL-MCNC: 1.35 MG/DL (ref 0.67–1.17)
DEPRECATED HCO3 PLAS-SCNC: 22 MMOL/L (ref 22–29)
EOSINOPHIL # BLD AUTO: 0 10E3/UL (ref 0–0.7)
EOSINOPHIL NFR BLD AUTO: 0 %
ERYTHROCYTE [DISTWIDTH] IN BLOOD BY AUTOMATED COUNT: 11.9 % (ref 10–15)
GFR SERPL CREATININE-BSD FRML MDRD: 56 ML/MIN/1.73M2
GLUCOSE SERPL-MCNC: 200 MG/DL (ref 70–99)
GLUCOSE SERPL-MCNC: 200 MG/DL (ref 70–99)
HCT VFR BLD AUTO: 35 % (ref 40–53)
HGB BLD-MCNC: 11.4 G/DL (ref 13.3–17.7)
IMM GRANULOCYTES # BLD: 0.1 10E3/UL
IMM GRANULOCYTES NFR BLD: 1 %
LYMPHOCYTES # BLD AUTO: 0.9 10E3/UL (ref 0.8–5.3)
LYMPHOCYTES NFR BLD AUTO: 7 %
MCH RBC QN AUTO: 31.7 PG (ref 26.5–33)
MCHC RBC AUTO-ENTMCNC: 32.6 G/DL (ref 31.5–36.5)
MCV RBC AUTO: 97 FL (ref 78–100)
MONOCYTES # BLD AUTO: 0.6 10E3/UL (ref 0–1.3)
MONOCYTES NFR BLD AUTO: 4 %
NEUTROPHILS # BLD AUTO: 11.5 10E3/UL (ref 1.6–8.3)
NEUTROPHILS NFR BLD AUTO: 88 %
NRBC # BLD AUTO: 0 10E3/UL
NRBC BLD AUTO-RTO: 0 /100
PLATELET # BLD AUTO: 328 10E3/UL (ref 150–450)
POTASSIUM SERPL-SCNC: 4.6 MMOL/L (ref 3.4–5.3)
RBC # BLD AUTO: 3.6 10E6/UL (ref 4.4–5.9)
SODIUM SERPL-SCNC: 132 MMOL/L (ref 136–145)
WBC # BLD AUTO: 13.1 10E3/UL (ref 4–11)

## 2023-04-18 PROCEDURE — 85014 HEMATOCRIT: CPT | Performed by: PHYSICIAN ASSISTANT

## 2023-04-18 PROCEDURE — 250N000009 HC RX 250: Performed by: SPECIALIST

## 2023-04-18 PROCEDURE — 250N000013 HC RX MED GY IP 250 OP 250 PS 637: Performed by: SPECIALIST/TECHNOLOGIST

## 2023-04-18 PROCEDURE — 97530 THERAPEUTIC ACTIVITIES: CPT | Mod: GP | Performed by: PHYSICAL THERAPIST

## 2023-04-18 PROCEDURE — 250N000011 HC RX IP 250 OP 636: Performed by: SPECIALIST/TECHNOLOGIST

## 2023-04-18 PROCEDURE — 97530 THERAPEUTIC ACTIVITIES: CPT | Mod: GO | Performed by: OCCUPATIONAL THERAPIST

## 2023-04-18 PROCEDURE — 97166 OT EVAL MOD COMPLEX 45 MIN: CPT | Mod: GO | Performed by: OCCUPATIONAL THERAPIST

## 2023-04-18 PROCEDURE — 272N000450 HC KIT 4FR POWER PICC SINGLE LUMEN

## 2023-04-18 PROCEDURE — 250N000011 HC RX IP 250 OP 636: Performed by: ORTHOPAEDIC SURGERY

## 2023-04-18 PROCEDURE — 97535 SELF CARE MNGMENT TRAINING: CPT | Mod: GO | Performed by: OCCUPATIONAL THERAPIST

## 2023-04-18 PROCEDURE — 250N000013 HC RX MED GY IP 250 OP 250 PS 637: Performed by: PHYSICIAN ASSISTANT

## 2023-04-18 PROCEDURE — 99207 PR NO BILLABLE SERVICE THIS VISIT: CPT | Performed by: PHYSICIAN ASSISTANT

## 2023-04-18 PROCEDURE — 258N000003 HC RX IP 258 OP 636: Performed by: ORTHOPAEDIC SURGERY

## 2023-04-18 PROCEDURE — 80048 BASIC METABOLIC PNL TOTAL CA: CPT | Performed by: PHYSICIAN ASSISTANT

## 2023-04-18 PROCEDURE — 36415 COLL VENOUS BLD VENIPUNCTURE: CPT | Performed by: PHYSICIAN ASSISTANT

## 2023-04-18 PROCEDURE — 36569 INSJ PICC 5 YR+ W/O IMAGING: CPT

## 2023-04-18 PROCEDURE — 97116 GAIT TRAINING THERAPY: CPT | Mod: GP | Performed by: PHYSICAL THERAPIST

## 2023-04-18 PROCEDURE — 258N000003 HC RX IP 258 OP 636: Performed by: SPECIALIST/TECHNOLOGIST

## 2023-04-18 PROCEDURE — 120N000001 HC R&B MED SURG/OB

## 2023-04-18 PROCEDURE — L1832 KO ADJ JNT POS R SUP PRE CST: HCPCS

## 2023-04-18 PROCEDURE — 99254 IP/OBS CNSLTJ NEW/EST MOD 60: CPT | Performed by: SPECIALIST

## 2023-04-18 PROCEDURE — 97161 PT EVAL LOW COMPLEX 20 MIN: CPT | Mod: GP | Performed by: PHYSICAL THERAPIST

## 2023-04-18 RX ORDER — ASPIRIN 81 MG/1
81 TABLET ORAL DAILY
Status: DISCONTINUED | OUTPATIENT
Start: 2023-04-19 | End: 2023-04-21 | Stop reason: HOSPADM

## 2023-04-18 RX ORDER — CEFTRIAXONE 2 G/1
2 INJECTION, POWDER, FOR SOLUTION INTRAMUSCULAR; INTRAVENOUS EVERY 24 HOURS
Status: DISCONTINUED | OUTPATIENT
Start: 2023-04-19 | End: 2023-04-21 | Stop reason: HOSPADM

## 2023-04-18 RX ORDER — PRASUGREL 10 MG/1
10 TABLET, FILM COATED ORAL DAILY
Status: DISCONTINUED | OUTPATIENT
Start: 2023-04-18 | End: 2023-04-21 | Stop reason: HOSPADM

## 2023-04-18 RX ORDER — LIDOCAINE 40 MG/G
CREAM TOPICAL
Status: DISCONTINUED | OUTPATIENT
Start: 2023-04-18 | End: 2023-04-18

## 2023-04-18 RX ADMIN — CEFAZOLIN SODIUM 2 G: 2 INJECTION, SOLUTION INTRAVENOUS at 08:00

## 2023-04-18 RX ADMIN — ASPIRIN 81 MG: 81 TABLET, COATED ORAL at 08:00

## 2023-04-18 RX ADMIN — OXYCODONE HYDROCHLORIDE 5 MG: 5 TABLET ORAL at 08:00

## 2023-04-18 RX ADMIN — OXYCODONE HYDROCHLORIDE 5 MG: 5 TABLET ORAL at 21:18

## 2023-04-18 RX ADMIN — SODIUM CHLORIDE, POTASSIUM CHLORIDE, SODIUM LACTATE AND CALCIUM CHLORIDE: 600; 310; 30; 20 INJECTION, SOLUTION INTRAVENOUS at 21:19

## 2023-04-18 RX ADMIN — LIDOCAINE HYDROCHLORIDE 2 ML: 10 INJECTION, SOLUTION EPIDURAL; INFILTRATION; INTRACAUDAL; PERINEURAL at 18:37

## 2023-04-18 RX ADMIN — PRASUGREL 10 MG: 10 TABLET, FILM COATED ORAL at 10:53

## 2023-04-18 RX ADMIN — OXYCODONE HYDROCHLORIDE 5 MG: 5 TABLET ORAL at 02:22

## 2023-04-18 RX ADMIN — SODIUM CHLORIDE, POTASSIUM CHLORIDE, SODIUM LACTATE AND CALCIUM CHLORIDE: 600; 310; 30; 20 INJECTION, SOLUTION INTRAVENOUS at 09:25

## 2023-04-18 RX ADMIN — OXYCODONE HYDROCHLORIDE 5 MG: 5 TABLET ORAL at 12:10

## 2023-04-18 RX ADMIN — CEFAZOLIN SODIUM 2 G: 2 INJECTION, SOLUTION INTRAVENOUS at 01:54

## 2023-04-18 RX ADMIN — METOPROLOL SUCCINATE 25 MG: 25 TABLET, EXTENDED RELEASE ORAL at 08:00

## 2023-04-18 RX ADMIN — ACETAMINOPHEN 975 MG: 325 TABLET ORAL at 21:18

## 2023-04-18 RX ADMIN — CEFAZOLIN SODIUM 2 G: 2 INJECTION, SOLUTION INTRAVENOUS at 16:25

## 2023-04-18 RX ADMIN — ROSUVASTATIN CALCIUM 40 MG: 20 TABLET, FILM COATED ORAL at 21:18

## 2023-04-18 RX ADMIN — ACETAMINOPHEN 975 MG: 325 TABLET ORAL at 05:47

## 2023-04-18 RX ADMIN — SENNOSIDES AND DOCUSATE SODIUM 1 TABLET: 50; 8.6 TABLET ORAL at 21:18

## 2023-04-18 RX ADMIN — FAMOTIDINE 20 MG: 20 TABLET ORAL at 08:00

## 2023-04-18 RX ADMIN — VANCOMYCIN HYDROCHLORIDE 1250 MG: 10 INJECTION, POWDER, LYOPHILIZED, FOR SOLUTION INTRAVENOUS at 23:03

## 2023-04-18 RX ADMIN — EZETIMIBE 10 MG: 10 TABLET ORAL at 21:18

## 2023-04-18 RX ADMIN — OXYCODONE HYDROCHLORIDE 5 MG: 5 TABLET ORAL at 17:18

## 2023-04-18 RX ADMIN — ACETAMINOPHEN 975 MG: 325 TABLET ORAL at 12:59

## 2023-04-18 ASSESSMENT — ACTIVITIES OF DAILY LIVING (ADL)
ADLS_ACUITY_SCORE: 22
IADL_COMMENTS: FAMILY CAN COMPLETE AS NEEDED

## 2023-04-18 NOTE — ANESTHESIA POSTPROCEDURE EVALUATION
Patient: Bridger Castillo    Procedure: Procedure(s):  Right Total Knee Arthroplasty Removal, Replacement with Antibiotic Spacer       Anesthesia Type:  General    Note:  Disposition: Inpatient   Postop Pain Control:            Sign Out: ONGOING pain issues   PONV: No   Neuro/Psych: Uneventful            Sign Out: Acceptable/Baseline neuro status   Airway/Respiratory: Uneventful            Sign Out: Acceptable/Baseline resp. status   CV/Hemodynamics: Uneventful            Sign Out: Acceptable CV status; No obvious hypovolemia; No obvious fluid overload   Other NRE: NONE   DID A NON-ROUTINE EVENT OCCUR? No    Event details/Postop Comments:  Ongoing pain issues despite hydromorphone, fentanyl and hydroxyzine. Vitals are stable, patient is sleeping between cares. Plan to transfer to floor to continue recovery and pain management per primary team.           Last vitals:  Vitals Value Taken Time   /83 04/17/23 1930   Temp 36.3  C (97.4  F) 04/17/23 1900   Pulse 92 04/17/23 1933   Resp 9 04/17/23 1933   SpO2 99 % 04/17/23 1933   Vitals shown include unvalidated device data.    Electronically Signed By: Ghanshyam Tyler DO  April 17, 2023  7:34 PM

## 2023-04-18 NOTE — PROGRESS NOTES
"SPIRITUAL HEALTH SERVICES Progress Note  [facility] [address] Ortho    Saw pt Bridger \"Sunny\"  HEVER Castillo per admission request.  His wife Consuelo was bedside.      Patient/Family Understanding of Illness and Goals of Care - Sunny shared that he has been hospitalized for an infection in his knee and currently he is waiting for cultures to come back and show what type of antibiotics he needs.  Further, he will have a new knee put in when he is stable and then 6-8 weeks of PT.  He is not expecting to be fully recovered until well into the summer.      Distress and Loss - In conversation, Sunny and Consuelo shared that Consuelo's mother is in memory care and that she is going back and forth to visit her mother and Sunny everyday.  Additionally, they are in the midst of a home renovation project that feels a little chaotic with his unexpected hospitalization.       Strengths, Coping, and Resources - Consuelo shared that Sunny is \"lighthearted and doesn't worry about too much.\"  He said that he likes to take things \"day by day\" but he affirmed that the road to full recovery feels daunting.        Meaning, Beliefs, and Spirituality - Sunny is [employer]ism.  He welcomed my offer of prayer, which I provided incorporating conversational themes.        Plan of Care - [facility] is available to patient and family for spiritual and emotional support.  Please consult should needs arise.      Gabriela Hwang, KERRY   Intern    [facility] routine referrals [facility] *[phone]  [facility] available 24/7 for emergent requests/referrals, either by paging the on-call  or by entering an ASAP/STAT consult in Epic (this will also page the on-call ).   "

## 2023-04-18 NOTE — PROGRESS NOTES
"ORTHOPEDIC LOWER EXTREMITY PROGRESS NOTE    Patient seen and examined by Dr. Dominique the below represents his recommendations and plan.    POD#1  Patient is a 72 year old male who underwent Procedure(s):  Right Total Knee Arthroplasty Removal, Replacement with Antibiotic Spacer on 2023. Pain is well controlled. Tolerating medication well, no nausea or vomiting.  We discussed plan for ID to see patient and plan to be on IV antibiotics at discharge. Has hamilton in place.      Vitals:   Blood pressure (!) 144/76, pulse 77, temperature 97.5  F (36.4  C), temperature source Oral, resp. rate 16, height 1.854 m (6' 1\"), weight 84.2 kg (185 lb 11.2 oz), SpO2 96 %.  Temp (24hrs), Av.8  F (36.6  C), Min:97.4  F (36.3  C), Max:98.4  F (36.9  C)      Drains: None    EXAM   The patient is awake and alert.  Calves are soft and non-tender.   Sensation is intact.  Dorsiflexion and plantar flexion is intact.  Foot warm with nl cap refill.  The incision is covered with prevena wound vac, empty cannister.     Labs:   Recent Labs   Lab Test 23  0806 23  0957 05/15/20  0930   HGB 11.4* 14.0 15.5   INR  --   --  1.02     CX: NGTD  ASSESSMENT  S/p R knee explant and placement of antibiotic spacer   PLAN  1. DVT prophylaxis: aspirin and prasugrel  2. Weight Bearing: TDWB (Touch down weight bearing). in KI.  Will switch to hinged knee brace locked in extension  3. Anticipated discharge date 2-3 days pending antibiotic plan . Discharge to Home with Home Care for IV infusions  4. Cont Pain Control Oxycodone   5.  ID and medicine following as well., appreciate assistance    Gayatri Bonilla PA-C  [facility]        "

## 2023-04-18 NOTE — CONSULTS
[facility]    Infectious Disease Consultation     Date of Admission:  4/17/2023  Date of Consult : 04/18/23    Assessment:  72 YM with R TKA 8 years ago who has been hospitalized with acute onset symptoms about a week ago and synovial fluid analysis consistent with infection. Likely hematogenous infection, though blood cxs are negative. He is S/p right knee I&D with removal of prosthesis and placement of antibiotic spacer.Cxs are pending      Recommendations:  1. PICC for long term IV antibiotics  2. Follow operative tissue cxs  3. Maintain on ceftriaxone and Vancomycin  Final antibiotic plan based on cx data      Nika Ortega MD    Reason for Consult   I was asked to evaluate this patient for treatment recommendations for right knee prosthetic joint infection    Primary Care Physician   GAURAV CARNEY    Chief Complaint   Right knee infection    History is obtained from the patient and medical records    History of Present Illness   Bridger Castillo is a 72 year old male with history of right TKA about 8 years ago. He developed acute onset swelling and pain of the knee , aspirate showed 22,000 WBCs with PMN predominance, cx remained negative. He has been afebrile, has elevated inflammatory markers and WBC . He is now s/p right knee I&D with removal of prosthesis and placement of antibiotic spacer. ID has been asked to assist with antibiotic management. Operative cxs are negative thus far.    He feels well, pain is controlled, tolerating antibiotics without side effects  Past Medical History   I have reviewed this patient's medical history and updated it with pertinent information if needed.   Past Medical History:   Diagnosis Date     Arthritis      Hyperlipemia      Hypertension      Myocardial infarction (H)     2011 STEMI     Post PTCA     JENNIFER to RCA 2011/// 2nd stent  2020 different artery       Past Surgical History   I have reviewed this patient's surgical history and updated it with  pertinent information if needed.  Past Surgical History:   Procedure Laterality Date     COLONOSCOPY N/A 7/16/2020    Procedure: COLONOSCOPY, WITH POLYPECTOMY AND BIOPSY;  Surgeon: Luis Beach MD;  Location:  GI     CV CORONARY ANGIOGRAM N/A 5/15/2020    Procedure: Coronary Angiogram;  Surgeon: August Hartmann MD;  Location:  HEART CARDIAC CATH LAB     CV PCI STENT DRUG ELUTING N/A 5/15/2020    Procedure: Percutaneous Coronary Intervention Stent Drug Eluting;  Surgeon: August Hartmann MD;  Location:  HEART CARDIAC CATH LAB     HEART CATH, ANGIOPLASTY      JENNIFER RCA 2011, 70% stenosis LAD     ORTHOPEDIC SURGERY      right knee replacement       Prior to Admission Medications   Prior to Admission Medications   Prescriptions Last Dose Informant Patient Reported? Taking?   Cholecalciferol (VITAMIN D) 2000 UNITS CAPS 4/13/2023 at am  Yes No   Sig: Take 2,000 Units by mouth daily   aspirin 81 MG tablet 4/13/2023 at am Self Yes No   Sig: Take 1 tablet by mouth daily.   cinnamon 500 MG CAPS Past Week  Yes Yes   Sig: Take 500 mg by mouth daily   ezetimibe (ZETIA) 10 MG tablet 4/16/2023 at 2200  No Yes   Sig: Take 1 tablet (10 mg) by mouth every evening   lisinopril (ZESTRIL) 20 MG tablet 4/13/2023 at am  No No   Sig: Take 1 tablet (20 mg) by mouth daily   metoprolol succinate ER (TOPROL XL) 25 MG 24 hr tablet 4/17/2023 at 0500  No Yes   Sig: Take 1 tablet (25 mg) by mouth daily   nitroGLYcerin (NITROSTAT) 0.4 MG sublingual tablet More than a month  No Yes   Sig: Place 1 tablet (0.4 mg) under the tongue every 5 minutes as needed for chest pain   prasugrel (EFFIENT) 10 MG TABS tablet 4/13/2023 at am  No No   Sig: Take 1 tablet (10 mg) by mouth daily   rosuvastatin (CRESTOR) 40 MG tablet 4/16/2023 at 2200  No Yes   Sig: Take 1 tablet (40 mg) by mouth daily      Facility-Administered Medications: None     Allergies   No Known Allergies    Immunization History   Immunization History    Administered Date(s) Administered     COVID-19 Vaccine 18+ (Moderna) 02/26/2021, 03/26/2021, 11/16/2021, 04/29/2022     COVID-19 Vaccine Bivalent Booster 18+ (Moderna) 01/11/2023       Social History   I have reviewed this patient's social history and updated it with pertinent information if needed. Bridger Castillo  reports that he has never smoked. He has never used smokeless tobacco. He reports that he does not drink alcohol and does not use drugs.    Family History   I have reviewed this patient's family history and updated it with pertinent information if needed.   Family History   Problem Relation Age of Onset     Hypertension Brother      Hypertension Father      Cancer - colorectal Father      Cancer Mother         lung     Other - See Comments Brother         heamachromatosis     Coronary Artery Disease Brother 61        MI       Review of Systems   The 10 point Review of Systems is as per HPI  Physical Exam   Temp: 97.5  F (36.4  C) Temp src: Oral BP: (!) 144/76 Pulse: 77   Resp: 16 SpO2: 96 % O2 Device: None (Room air) Oxygen Delivery: 2 LPM  Vital Signs with Ranges  Temp:  [97.4  F (36.3  C)-98.4  F (36.9  C)] 97.5  F (36.4  C)  Pulse:  [75-96] 77  Resp:  [8-28] 16  BP: (107-168)/(73-94) 144/76  SpO2:  [95 %-100 %] 96 %  185 lbs 11.2 oz  Body mass index is 24.5 kg/m .    GENERAL APPEARANCE:  awake  EYES: Eyes grossly normal to inspectiony  RESP: lungs clear   CV: regular rates and rhythm  ABDOMEN: soft, nontender  MS: R knee in immobilizer, wound vac in place  SKIN: no suspicious lesions or rashes      Data   All laboratory data reviewed  Component      Latest Ref Rng 4/18/2023  8:06 AM   WBC      4.0 - 11.0 10e3/uL 13.1 (H)    RBC Count      4.40 - 5.90 10e6/uL 3.60 (L)    Hemoglobin      13.3 - 17.7 g/dL 11.4 (L)    Hematocrit      40.0 - 53.0 % 35.0 (L)    MCV      78 - 100 fL 97    MCH      26.5 - 33.0 pg 31.7    MCHC      31.5 - 36.5 g/dL 32.6    RDW      10.0 - 15.0 % 11.9    Platelet Count       150 - 450 10e3/uL 328    % Neutrophils      % 88       Component      Latest Ref Rng 4/17/2023  9:50 PM   Sed Rate      0 - 20 mm/hr 48 (H)    CRP Inflammation      <5.00 mg/L 48.21 (H)       Component      Latest Ref Rng 4/12/2023  3:35 PM   Color Fluid      Colorless, Yellow  Yellow    Appearance Fluid      Clear  Cloudy !    Cell Count Fluid Source Knee, Right    Total Nucleated Cells      /uL 22,310    % Neutrophils Fluid      % 90    % Lymphocytes Fluid      % 8    % Eosinophils Fluid      % 2    Crystal Analysis      No clinically significant crystals seen.  No clinically significant crystals seen.       Microbiology  4/17 r knee tissue cx pending  4/12 R knee synovial fluid cx negative

## 2023-04-18 NOTE — OP NOTE
Procedure Date: 04/17/2023    PREOPERATIVE DIAGNOSIS:  Infected right total knee replacement.    POSTOPERATIVE DIAGNOSIS:  Infected right total knee replacement.    PROCEDURES:    1.  Right knee irrigation and debridement with removal of prosthesis.  2.  Placement of high-dose articulating antibiotic spacer.    SURGEON:  Scott Duane Anseth, MD    ASSISTANTS:    1.  Nikole Randall.  2.  Waqas Uribe PA-C.    ANESTHESIA:  General.    SPECIMENS:  Multiple tissue samples sent to microbiology for culture, anaerobic and aerobic cultures to be held for 14 days.    ESTIMATED BLOOD LOSS:  25 mL    BLOOD REPLACEMENT:  None.    FLUIDS:  Per Anesthesia.    SPECIMENS:  None.    COMPLICATIONS:  None.    TOURNIQUET TIME:  120 minutes at 250 mmHg.    INDICATIONS:  The patient is a very pleasant 72-year-old male who underwent a right total knee replacement approximately 8 years ago.  He has really been very satisfied and pain free until last week when he came in with increasing swelling and pain.  His aspirate showed 22,000 WBCs in the synovial fluid as well as 90% neutrophils.  His serum D-dimer is elevated at 1.3, approximately.  After discussing risks, benefits, possible complications, treatment and alternatives, we decided to come to the operating room for exploration, probable removal of the prosthesis and placement of high-dose antibiotic spacers.  His symptoms have been perhaps lasting as long as 3 weeks.    Preoperatively, the risks, benefits, possible complications, and treatment alternatives were discussed.  The risks discussed included, but were not limited to wear, loosening, infection, neurovascular damage, thromboembolic disease, the physiological stresses of the surgery, limb length inequality, persistent pain, stiffness, and dislocation.  All of his questions were satisfactorily answered and he wished to proceed with joint replacement surgery again to improve his lifestyle and reduce his pain.    IMPLANTS:    1.   Femoral component:  Elizabeth Triathlon CR femur, right size 6.  2.  Tibial insert is a Elizabeth Triathlon 9 mm CS insert.  3.  Bone cement is Simplex radiopaque bone cement.  Into each batch, 3 grams of vancomycin and 3.6 grams of tobramycin were added.  We used a total of 4 batches.    DESCRIPTION OF PROCEDURE:  The patient was brought to the operating room on a bed.  After adequate induction of anesthetic, a tourniquet was placed proximal on the right thigh and the right lower extremity prepped and draped free in the usual sterile fashion using Betadine scrub and a ChloraPrep paint.  Timeout procedure was performed.  The leg was elevated, but not exsanguinated.  The tourniquet was inflated to 250 mmHg.    An anterior incision was centered over the knee using the previous scar and extended just slightly proximally and distally.  Full thickness flaps were created to allow for a medial parapatellar arthrotomy.  We dislocated the patella and lateral gutter.  We then released the meniscal tibial ligament as feasible medially and laterally.  We subperiosteally elevated the MCL all the way to the origin of the semimembranosus.  The majority of the scar tissue was excised from the retropatellar space.  We could see that the inside lining, there was a significant amount of synovitis.  It looked chronic in nature.  There was also some purulent fluid, which was aspirated as well.  At this stage, we then proceeded with the synovectomy, medial gutter, lateral gutter, suprapatellar pouch, retropatellar space.  This allowed mobilization of the patella.  We took a 1/2-inch curved osteotome and went along the anterolateral prosthesis, anterolateral tibia to free this scar tissue up and then removed it.  We were able now to dislocate the patella into the lateral gutter and turned our attention to the knee.  We debrided the anterior femur and continued to debride the medial and lateral gutters.  We then used a curette to develop the  bone prosthesis interface.  With the bone prosthesis interface of the femur well identified, we then turned our attention to its removal.    We used a thin, flexible saw blade.  We went medially and laterally, anterior flange chamfer mid-portion and distal.  We then used thin flexible osteotomes to complete loosening of the femur circumferentially.  We then removed the femur with gentle retrograde impaction with minimal bone loss.  With the femur out of the way, we debrided further.  We did send some tissue from the medial gutter suprapatellar pouch to the microbiology and then now, we sent some posterior capsular tissue as well.  With the femur out of the way, we then turned our attention to the tibia.      Again, we used electrocautery to circumferentially identify the border of the tibia.  Once we had the tibia well identified, blunt Hohmann posteriorly, medially pointed Hohmann laterally.  We then used a small saw blade again to break up the bone prosthesis anteriorly and then medially, and medially posterior where we could have easy access.  We then used thin flexible osteotomes of varying lengths to break up the bone prosthesis interface circumferentially.  We were then able to remove the tibia with retrograde impaction.  We had minimal bone loss posterolaterally, so we were quite pleased with this.  Once the tibia was out, we then removed the bone from the metaphyseal diaphyseal junction using 1/4 inch osteotome.  This allowed us access to medullary canal of the tibia.  We did, at this stage of the game, ream the tibia with the straight reamers.  We then irrigated the tibia with 3 liters of saline.  We brought up our straight reamer.  We put it in until we got to a 14.  We then pinned our proximal tibial cutting guide in place and took a skinning cut, removing any residual deformity, bone and cement.  Once we had this cut done, we removed the peripheral bony fragments. We had now, a nice circumferential  visualization of the cortical bone of the tibia.  We found no evidence of any residual cement in the metaphyseal bone.  Satisfied, we placed a Ray-Franklin in the metaphysis to prevent any debris from falling into the tibia.  We turned our attention to the femur.    We used the Midas bur, the saw blade and we freshened our posterior cut, our distal cut and our anterior slightly.  We then used the dominic to remove any cement sclerotic bone from the peg holes from the CR femur.  We irrigated this copiously.  We then irrigate the medullary canal of the femur as well with saline.  Once we had this thoroughly irrigated and we visualized the bone circumferentially and found no evidence of residual cement or foreign material, we then turned our attention to the patella.    The knee was packed with a lap sponge.  We everted the patella.  We debrided the synovium off the undersurface of the quad lateral gutter patellar tendon.  We had 360-degree visualization of our patella and then we used our oscillating saw to cut through the patellar pegs.  Once we were through the pegs, we then used the saw to freshen our patellar cut to remove any residual cement.  This left us with only the cemented pegs.  We used the Midas dominic to remove these and then bored these out until there was no residual polyethylene or residual cement where the peg holes was used to be.  We irrigated the patellar copiously.  We burred the surface again gently to ensure that we had removed any residual bone present.  Satisfied that the patella had been thoroughly debrided, we brought the knee into full extension and irrigated it with 3 liters of saline.  After irrigating with 3 liters of saline, we debrided the medial and lateral gutters, anterior aspect of the femur.  We then flexed the knee, debrided the medial gutter, the posterior aspect of the knee.  Very satisfied with our debridement at this stage of the game.  We then removed the sponges.  We irrigated the  femur and tibial canals with 3 liters of saline.  We put a new Ray-Franklin sponge as we brought the knee out in full extension.  We then irrigated the knee with a dilute Betadine irrigation and had it sit for 3 minutes.  Once this was done, we irrigated the knee with an additional 3 liters of saline and then inspected it one final time, debriding anything as necessary.  After this, we flexed the knee up, we let the tourniquet down at 120 minutes and we prepared for placing the spacer.  A dowel was placed in the tibia.  We mixed up 2 batches of Simplex radiopaque bone cement. Again in each batch, there were 3 grams of vancomycin and 3.6 grams of tobramycin.  We built our tibial component out of cement and then the thin CS bearing we kept at about 22-23 mm thick, which matched the gaps.  We measured the symmetric gaps.  Satisfied with our tibia after the cement had hardened, we then placed the trial femur on and the knee came out to full extension.  We maybe had just a slight amount of obliquity in there.  We felt pretty comfortable with that, so then, we mixed up a batch of Simplex radiopaque bone cement again with 3 grams of vancomycin and 3 grams of tobramycin.  We cemented in the femoral component loosely.  We held the leg out in full extension until the cement had completely hardened. We did place a small cement button on the back of the kneecap.  Once this was done, we flexed the knee up and removed any residual cement.  We trialed the knee. It came out to full extension, just a hint of hyperextension, flexed up well, had pretty good stability to varus and valgus stresses at 0, 30 and 90, had about 4-5 mm translation at 90.  Satisfied the spacer was appropriate, we then irrigated the wounds copiously one final time and then began a layered closure.  The arthrotomy was closed using #1 Vicryl in an interrupted fashion.  The Subcutaneous tissues were closed using 2-0 Vicryl in a buried interrupted fashion.  The skin  was brought together and everted with a combination of staples and then 3-0 nylon sutures and then a Prevena VAC dressing was placed.  A knee immobilizer was placed.  The patient was awakened and transported to postanesthesia care in stable condition.  At the end the case, sponge and needle counts were correct.    Hemostasis was obtained throughout the procedure and prior to the closure, each layer using electrocautery.  Pulsatile irrigation and dilute Betadine irrigation were used during the procedure.  Surgical team body exhaust systems and high exchange airflow were used during the procedure.  The patient did receive 2 grams of Ancef within 1 hour of the onset of the procedure.    POSTOPERATIVE PLAN:    1.  Touchdown weightbearing on the right lower extremity while in an immobilizer and then when we switch him to a hinged range of motion brace locked in extension.  2.  Antibiotic prophylaxis will be with combination of Ancef 2 grams IV every 8 hours as well as vancomycin 1 gram and then directed by pharmacy dosing, given his kidney function.  3.  ID consult to follow along and aid in postoperative antibiotic therapy as we manage this infected total knee.  4.  DVT prophylaxis will be currently with aspirin 81 mg 1 p.o. b.i.d. for the next 6 weeks.  5.  Pain controlled with combination of oxycodone and Tylenol.  6.  Prevena wound VAC for the next 10-14 days until removed at the office.  7.  The patient will follow up with Dr. Dominique in 10-14 days for repeat x-rays and examination.    Scott Duane Anseth, MD        D: 2023   T: 2023   MT: moose    Name:     ACOSTA MARTINEZ  MRN:      [id number]        Account:        [id number]   :      1950           Procedure Date: 2023     Document: [id number]

## 2023-04-18 NOTE — PROCEDURES
[facility]    Single Lumen PICC Placement    Date/Time: 4/18/2023 6:43 PM    Performed by: Conchis Hercules RN  Authorized by: Nika Ortega MD  Indications: vascular access      UNIVERSAL PROTOCOL   Site Marked: Yes  Prior Images Obtained and Reviewed:  Yes  Required items: Required blood products, implants, devices and special equipment available    Patient identity confirmed:  Verbally with patient, arm band and hospital-assigned identification number  NA - No sedation, light sedation, or local anesthesia  Confirmation Checklist:  Patient's identity using two indicators, relevant allergies, procedure was appropriate and matched the consent or emergent situation and correct equipment/implants were available  Time out: Immediately prior to the procedure a time out was called    Universal Protocol: the Joint Commission Universal Protocol was followed    Preparation: Patient was prepped and draped in usual sterile fashion       ANESTHESIA    Local Anesthetic: Lidocaine 1% without epinephrine  Anesthetic Total (mL):  2      SEDATION    Patient Sedated: No        Skin prep agent: skin prep agent completely dried prior to procedure  Sterile barriers: maximum sterile barriers were used: cap, mask, sterile gown, sterile gloves, and large sterile sheet  Hand hygiene: hand hygiene performed prior to central venous catheter insertion  Type of line used: PICC  Catheter type: single lumen  Lumen type: valved and power PICC  Catheter size: 4 Fr  Brand: Bard  Placement method: venipuncture, MST and ultrasound  Number of attempts: 1  Difficulty threading catheter: no  Successful placement: yes  Orientation: right    Location: basilic vein  Tip Location: SVC/RA Junction  Arm circumference: adults 10 cm  Extremity circumference: 30  Visible catheter length: 5  Total catheter length: 50  Internal Lumen Volume: 45 mL    Dressing and securement: chlorhexidine patch applied, subcutaneous anchor securement  system, transparent securement dressing, transparent dressing, sterile dressing applied, securement device and site cleansed  Post procedure assessment: blood return through all ports and placement verified by 3CG technology  PROCEDURE   Patient Tolerance:  Patient tolerated the procedure well with no immediate complicationsDescribe Procedure: Nursing note  Pt a/ox 4, the writer explained to the pt the risks/benefits of the procedure and addressed pt concerns. Pt verbalized understanding. Placed a single lumen PICC on the right basilic vein successfully on one attempt w/good blood return noted. Brochure on the kit was given to pt.  Disposal: sharps and needle count correct at the end of procedure, needles and guidewire disposed in sharps container

## 2023-04-18 NOTE — PHARMACY-VANCOMYCIN DOSING SERVICE
Pharmacy Vancomycin Initial Note  Date of Service 2023  Patient's  1950  72 year old, male    Indication: Bone and Joint Infection    Current estimated CrCl = Estimated Creatinine Clearance: 49 mL/min (A) (based on SCr of 1.67 mg/dL (H)).    Creatinine for last 3 days  2023:  9:57 AM Creatinine 1.67 mg/dL    Recent Vancomycin Level(s) for last 3 days  No results found for requested labs within last 3 days.      Vancomycin IV Administrations (past 72 hours)      No vancomycin orders with administrations in past 72 hours.                Nephrotoxins and other renal medications (From now, onward)    Start     Dose/Rate Route Frequency Ordered Stop    23  vancomycin (VANCOCIN) 1,250 mg in 0.9% NaCl 250 mL intermittent infusion         1,250 mg  over 90 Minutes Intravenous EVERY 24 HOURS 23            Contrast Orders - past 72 hours (72h ago, onward)    None          [employer]ight[employer] Prediction of Planned Initial Vancomycin Regimen  Loading dose: N/A  Regimen: 1250 mg IV every 24 hours.  Start time: 22:35 on 2023  Exposure target: AUC24 (range)400-600 mg/L.hr   AUC24,ss: 538 mg/L.hr  Probability of AUC24 > 400: 81 %  Ctrough,ss: 17 mg/L  Probability of Ctrough,ss > 20: 34 %  Probability of nephrotoxicity (Lodise YRN ): 13 %        Plan:  1. Start vancomycin  1250 mg IV q24h.   2. Vancomycin monitoring method: AUC  3. Vancomycin therapeutic monitoring goal: 400-600 mg*h/L  4. Pharmacy will check vancomycin levels as appropriate in 1-3 Days.    5. Serum creatinine levels will be ordered daily for the first week of therapy and at least twice weekly for subsequent weeks.      Leslie Waite, [facility]

## 2023-04-18 NOTE — PROGRESS NOTES
"   04/18/23 0800   Appointment Info   Signing Clinician's Name / Credentials (PT) Felix Birch DPT       Present no   Living Environment   People in Home spouse   Current Living Arrangements house   Home Accessibility stairs within home   Number of Stairs, Within Home, Primary greater than 10 stairs  (12 (5+7 with landing inbetween))   Stair Railings, Within Home, Primary railing on right side (ascending)   Transportation Anticipated family or friend will provide   Living Environment Comments Pt lives in a house with his spouse. Stairs within the home to get to bedroom. Pt reports his spouse will pick pt up upon discharge and provide assist if needed.   Self-Care   Usual Activity Tolerance good   Current Activity Tolerance moderate   Regular Exercise No   Equipment Currently Used at Home none   Fall history within last six months yes   Number of times patient has fallen within last six months 1   Activity/Exercise/Self-Care Comment Pt reports being IND at baseline with all ADLs. Pt ambulates without an AD at baseline and does not own a FWW. Pt drives.   General Information   Onset of Illness/Injury or Date of Surgery 04/18/23   Referring Physician Nikole Randall PA-C   Patient/Family Therapy Goals Statement (PT) \"To get better\"   Pertinent History of Current Problem (include personal factors and/or comorbidities that impact the POC) Per Chart: s/p R I and D of knee POD#1   Existing Precautions/Restrictions fall   Weight-Bearing Status - LLE full weight-bearing   Weight-Bearing Status - RLE toe touch weight-bearing   Cognition   Orientation Status (Cognition) oriented x 4   Pain Assessment   Patient Currently in Pain No   Integumentary/Edema   Integumentary/Edema Comments RLE in KI   Posture    Posture Forward head position;Protracted shoulders   Range of Motion (ROM)   Range of Motion ROM is WFL   ROM Comment RLE in KI   Strength (Manual Muscle Testing)   Strength (Manual Muscle " Testing) Able to perform L SLR;Deficits observed during functional mobility   Strength Comments Pt unable to perform SLR on RLE at this time   Bed Mobility   Comment, (Bed Mobility) Supine>sit w/ CGA   Transfers   Comment, (Transfers) Sit>stand w/ FWW and CGA   Gait/Stairs (Locomotion)   [place] Level (Gait) contact guard   Assistive Device (Gait) walker, front-wheeled   Distance in Feet 5'   Distance in Feet (Gait) 50'   Balance   Balance Comments Good static sitting at EOB; Pt steady with ambulation using a FWW   Clinical Impression   Criteria for Skilled Therapeutic Intervention Yes, treatment indicated   PT Diagnosis (PT) Impaired gait   Influenced by the following impairments Decreased activity tolerance; decreased balance; decreased strength   Functional limitations due to impairments Impaired functional mobility   Clinical Presentation (PT Evaluation Complexity) Stable/Uncomplicated   Clinical Presentation Rationale Clinical judgement   Clinical Decision Making (Complexity) low complexity   Planned Therapy Interventions (PT) balance training;bed mobility training;gait training;patient/family education;stair training;strengthening;transfer training;progressive activity/exercise   Risk & Benefits of therapy have been explained evaluation/treatment results reviewed;care plan/treatment goals reviewed;risks/benefits reviewed;current/potential barriers reviewed;participants voiced agreement with care plan;participants included;patient   PT Total Evaluation Time   PT Eval, Low Complexity Minutes (71925) 10   Plan of Care Review   Plan of Care Reviewed With patient   Physical Therapy Goals   PT Frequency Daily   PT Predicted Duration/Target Date for Goal Attainment 04/25/23   PT Goals Bed Mobility;Transfers;Gait;Stairs   PT: Bed Mobility Supervision/stand-by assist;Supine to/from sit   PT: Transfers Supervision/stand-by assist;Sit to/from stand;Assistive device;Within precautions   PT: Gait Supervision/stand-by  assist;Assistive device;50 feet   PT: Stairs Minimal assist;Greater than 10 stairs;Rail on right   Interventions   Interventions Quick Adds Gait Training;Therapeutic Activity   Therapeutic Activity   Therapeutic Activities: dynamic activities to improve functional performance Minutes (26121) 15   Symptoms Noted During/After Treatment Fatigue   Treatment Detail/Skilled Intervention Greeted pt supine in bed, agreed to PT. VSS on RA throughout session. PT educated pt on TTWBing and having KI on RLE at all times, pt voiced understanding. Pt performed supine>sit w/ CGA, using LLE to assist moving RLE off of bed. Pt unable to volitionally move RLE against gravity at this time. Once in sitting, pt able to scoot self to EOB and sit unsupported without LOB. Pt performed sit>stand x 3 w/ FWW and CGA, verbal cues for hand placement and technique. Pt steady in standing. After ambulation, pt returned to bed and performed stand>sit w/ FWW and CGA, verbal cues to descend in a slow, controlled motion. Pt returned to supine w/ CGA, again using LLE to assist RLE back into bed and reposition. Pt ended session supine in bed, with all needs met and call light within reach.   Gait Training   Gait Training Minutes (60087) 15   Symptoms Noted During/After Treatment (Gait Training) fatigue   Treatment Detail/Skilled Intervention Pt ambulated w/ FWW and CGA. PT provided visual demonstration for ambulation while abiding by WBing precautions. Pt ambulated with a step-to pattern, utilizing BUEs on FWW to reduce WBing on RLE. Pt was steady throughout and had no LOB. Elected not to attemtp stairs on this date.   PT Discharge Planning   PT Plan Review precautions; gait w/ FWW; trial stairs (5+7 stairs w/ landing in between)   PT Discharge Recommendation (DC Rec) home with home care physical therapy  (Defer to ortho)   PT Rationale for DC Rec Pt is below baseline. Pt currently requiring A x 1 with all functional mobility. Pt able to ambulate ~50'  while abiding by WBing precautions on this date. Pt will have to negotiate stairs to safely return home. Anticipate pt will be able to safely return home w/ assist from spouse.   PT Brief overview of current status Supine>sit w/ CGA: sit>stand w/ FWW and CGA; gait w/ FWW and CGA   Total Session Time   Timed Code Treatment Minutes 30   Total Session Time (sum of timed and untimed services) 40

## 2023-04-18 NOTE — PROGRESS NOTES
Subjective: We received an order on EP[employer] for a hinged knee brace locked in extension on 2317-01.  DX:  S/P R knee explant with placement of antbiotic spacer.     Objective/G:  Fit TSCOPE     Assessment:  I fit the TSCOPE to the Pt. R leg and trimmed the straps to the correct length.  I adjusted the brace to the correct height.  The brace fits the Pt. R leg adequate. I explained to the patient that the brace will keep the R knee locked in extension.    Plan:  I have donned the brace and left it on the Pt leg.  Pt. was informed if any questions comments or concerns they are to contact us.    Sebastián ELIZALDE

## 2023-04-18 NOTE — PROGRESS NOTES
Patient vital signs are at baseline: Yes  Patient able to ambulate as they were prior to admission or with assist devices provided by therapies during their stay:  Yes  Patient MUST void prior to discharge:  Yes via Tariq  Patient able to tolerate oral intake:  Yes  Pain has adequate pain control using Oral analgesics:  Yes  Does patient have an identified :  Yes  Has goal D/C date and time been discussed with patient:  Yes     A&O x4  Up with 1 GB and walker  Right leg dressing and Ace wrap CDI.   Schedule Tylenol and PRN Oxycodone for pain management.

## 2023-04-18 NOTE — CONSULTS
Patient will be needing long term IV antibiotics at discharge. Referral sent to [facility] to check home IV Antibiotic benefits. Jessica Liaison with [facility] will be following patient. Home Care needs at discharge will be RN and PT as noted in Case Management Consult for discharge planning.

## 2023-04-18 NOTE — PROGRESS NOTES
04/18/23 0925   Appointment Info   Signing Clinician's Name / Credentials (OT) Maude gao OTR/L   Living Environment   People in Home spouse   Current Living Arrangements house   Home Accessibility stairs within home   Number of Stairs, Within Home, Primary greater than 10 stairs   Stair Railings, Within Home, Primary railing on right side (ascending)   Transportation Anticipated family or friend will provide   Living Environment Comments Pt lives in a house with his spouse. Stairs within the home to get to bedroom. Pt reports his spouse will pick pt up upon discharge and provide assist if needed.   Self-Care   Usual Activity Tolerance good   Current Activity Tolerance moderate   Regular Exercise No   Equipment Currently Used at Home none  (raised toilets)   Fall history within last six months yes   Number of times patient has fallen within last six months 1   Activity/Exercise/Self-Care Comment Pt reports being IND at baseline with all ADLs. Pt ambulates without an AD at baseline and does not own a FWW. Pt drives.   Instrumental Activities of Daily Living (IADL)   IADL Comments family can complete as needed   General Information   Onset of Illness/Injury or Date of Surgery 04/17/23   Referring Physician Nikole Randall PA-C   Patient/Family Therapy Goal Statement (OT) home   Additional Occupational Profile Info/Pertinent History of Current Problem S/p R knee explant and placement of antibiotic spacer   Existing Precautions/Restrictions fall   Right Lower Extremity (Weight-bearing Status) touch down weight-bearing (TDWB)   General Observations and Info activity order: ambulate with assist 3x daily   Cognitive Status Examination   Cognitive Status Comments no cognitive concerns   Pain Assessment   Patient Currently in Pain Yes, see Vital Sign flowsheet   Posture   Posture not impaired   Range of Motion Comprehensive   Comment, General Range of Motion BUE WFL; RLE limited by KI/surgical precautions   Strength  Comprehensive (MMT)   Comment, General Manual Muscle Testing (MMT) Assessment BUE WFL; RLE limited by surgical precautions; unable to SLR RLE, but able to slide along bed   Coordination   Upper Extremity Coordination No deficits were identified   Transfers   Transfers sit-stand transfer;toilet transfer;shower transfer   Sit-Stand Transfer   Sit-Stand [place] (Transfers) moderate assist (50% patient effort)   Assistive Device (Sit-Stand Transfers) walker, front-wheeled   Shower Transfer   [place] Level (Shower Transfer) maximum assist (25% patient effort)   Shower Transfer Comments walk-in shower and tub/shower   Toilet Transfer   Type (Toilet Transfer) sit-stand;stand-sit   [place] Level (Toilet Transfer) moderate assist (50% patient effort)   Assistive Device (Toilet Transfer) grab bars/safety frame;walker, front-wheeled   Toilet Transfer Comments raised toilets   Balance   Balance Comments good seated; good standing EOB w/2WW   Activities of Daily Living   BADL Assessment/Intervention lower body dressing;toileting   Lower Body Dressing Assessment/Training   [place] Level (Lower Body Dressing) maximum assist (25% patient effort)   Toileting   [place] Level (Toileting) moderate assist (50% patient effort)   Clinical Impression   Criteria for Skilled Therapeutic Interventions Met (OT) Yes, treatment indicated   OT Diagnosis impaired ADLs   OT Problem List-Impairments impacting ADL problems related to;range of motion (ROM);strength;pain;post-surgical precautions   Assessment of Occupational Performance 3-5 Performance Deficits   Identified Performance Deficits dressing, toilet transfer, shower transfer   Planned Therapy Interventions (OT) ADL retraining;transfer training   Clinical Decision Making Complexity (OT) moderate complexity   Anticipated Equipment Needs Upon Discharge (OT) reacher;shower chair   Risk & Benefits of therapy have been explained evaluation/treatment results  reviewed;patient;spouse/significant other   OT Total Evaluation Time   OT Eval, Moderate Complexity Minutes (26441) 10   OT Goals   Therapy Frequency (OT) 3 times/wk   OT Predicted Duration/Target Date for Goal Attainment 04/24/23   OT Goals Lower Body Dressing;Transfers;Toilet Transfer/Toileting   OT: Lower Body Dressing Supervision/stand-by assist;using adaptive equipment;within precautions   OT: Transfer Supervision/stand-by assist;with assistive device;within precautions   OT: Toilet Transfer/Toileting Modified independent;toilet transfer;cleaning and garment management;using adaptive equipment;within precautions   Interventions   Interventions Quick Adds Self-Care/Home Management;Therapeutic Activity   Self-Care/Home Management   Self-Care/Home Mgmt/ADL, Compensatory, Meal Prep Minutes (74432) 24   Symptoms Noted During/After Treatment (Meal Preparation/Planning Training) fatigue;increased pain   Treatment Detail/Skilled Intervention Pt ed in dressing supine, to maintain WB prec. Pt ed in and used reacher to thread pants over RLE, A due to ace-wrap friction, and inablility to SLR; pt able to thread LLE and work pants up.  Ed in dressing EOB, but with maintainance of TDWB t/o.  Ed in clothing choices for ease of dressing.  Pt able to don R slipper sock over toes, A to pull up over ace-wrap.  Wife able to assist at home prn, anticipate pt will be Ruth once ace-wrap removed.  Pt has slip-on shoes. Pt completed toilet transfer ModA with heavy use of grab bar and commode arm.  Ed pt in hand placement options, and he completed a second transfer with grab bar and walker, CGA.  Pt ed in tub/shower transfer using shower chair, pt familiar with this technique. Pt ed in walk-in shower using walker, or stepping in backwards leaving walker out, pt verbalized understanding. Ed pt on waiting for shower until MD rosenberg.  AE/DME h/o issued.   Therapeutic Activities   Therapeutic Activity Minutes (25416) 10   Treatment  Detail/Skilled Intervention CGA supine <> sit, pt using L foot under R ankle to move RLE; EHOB.  CGA STS with 2WW, cued for hand placement, bed raised to simulate pt's home bed height.  CGA ambulation in room using 2WW, assist for wound vac/IV.  Reviewed walker/step progression, pt moving well.  Ed in activity at home, getting up every 1-2 hours for short activity. MD in during session with surgery update.   OT Discharge Planning   OT Plan check with patient if he wants review of LE dressing, toilet transfer, shower transfer   OT Discharge Recommendation (DC Rec) home with assist   OT Rationale for DC Rec Pt progressing well, anticipate he will able to discharge to home with assist for ADLs prn, once medically stable.   OT Brief overview of current status [place] LE dressing, CGA toilet transfer to commode overlay   Total Session Time   Timed Code Treatment Minutes 34   Total Session Time (sum of timed and untimed services) 44

## 2023-04-19 ENCOUNTER — APPOINTMENT (OUTPATIENT)
Dept: PHYSICAL THERAPY | Facility: CLINIC | Age: 73
End: 2023-04-19
Attending: ORTHOPAEDIC SURGERY
Payer: COMMERCIAL

## 2023-04-19 ENCOUNTER — HOME INFUSION (PRE-WILLOW HOME INFUSION) (OUTPATIENT)
Dept: PHARMACY | Facility: CLINIC | Age: 73
End: 2023-04-19
Payer: COMMERCIAL

## 2023-04-19 LAB
CREAT SERPL-MCNC: 1.16 MG/DL (ref 0.67–1.17)
GFR SERPL CREATININE-BSD FRML MDRD: 68 ML/MIN/1.73M2
GLUCOSE SERPL-MCNC: 108 MG/DL (ref 70–99)
HGB BLD-MCNC: 10.4 G/DL (ref 13.3–17.7)
SODIUM SERPL-SCNC: 137 MMOL/L (ref 136–145)
VANCOMYCIN SERPL-MCNC: 6.9 UG/ML

## 2023-04-19 PROCEDURE — 82565 ASSAY OF CREATININE: CPT | Performed by: ORTHOPAEDIC SURGERY

## 2023-04-19 PROCEDURE — 80202 ASSAY OF VANCOMYCIN: CPT | Performed by: ORTHOPAEDIC SURGERY

## 2023-04-19 PROCEDURE — 97530 THERAPEUTIC ACTIVITIES: CPT | Mod: GP | Performed by: PHYSICAL THERAPIST

## 2023-04-19 PROCEDURE — 250N000013 HC RX MED GY IP 250 OP 250 PS 637: Performed by: PHYSICIAN ASSISTANT

## 2023-04-19 PROCEDURE — 84295 ASSAY OF SERUM SODIUM: CPT | Performed by: PHYSICIAN ASSISTANT

## 2023-04-19 PROCEDURE — 250N000011 HC RX IP 250 OP 636: Performed by: SPECIALIST/TECHNOLOGIST

## 2023-04-19 PROCEDURE — 120N000001 HC R&B MED SURG/OB

## 2023-04-19 PROCEDURE — 250N000013 HC RX MED GY IP 250 OP 250 PS 637: Performed by: ORTHOPAEDIC SURGERY

## 2023-04-19 PROCEDURE — 82947 ASSAY GLUCOSE BLOOD QUANT: CPT | Performed by: ORTHOPAEDIC SURGERY

## 2023-04-19 PROCEDURE — 250N000011 HC RX IP 250 OP 636: Performed by: ORTHOPAEDIC SURGERY

## 2023-04-19 PROCEDURE — 97116 GAIT TRAINING THERAPY: CPT | Mod: GP | Performed by: PHYSICAL THERAPIST

## 2023-04-19 PROCEDURE — 250N000013 HC RX MED GY IP 250 OP 250 PS 637: Performed by: SPECIALIST/TECHNOLOGIST

## 2023-04-19 PROCEDURE — 85018 HEMOGLOBIN: CPT | Performed by: SPECIALIST/TECHNOLOGIST

## 2023-04-19 PROCEDURE — 99233 SBSQ HOSP IP/OBS HIGH 50: CPT | Performed by: SPECIALIST

## 2023-04-19 PROCEDURE — 258N000003 HC RX IP 258 OP 636: Performed by: ORTHOPAEDIC SURGERY

## 2023-04-19 RX ORDER — CEFTRIAXONE 2 G/1
2 INJECTION, POWDER, FOR SOLUTION INTRAMUSCULAR; INTRAVENOUS EVERY 24 HOURS
Qty: 800 ML | Refills: 0 | DISCHARGE
Start: 2023-04-19 | End: 2023-05-29

## 2023-04-19 RX ORDER — FAMOTIDINE 20 MG/1
20 TABLET, FILM COATED ORAL EVERY 12 HOURS
Status: DISCONTINUED | OUTPATIENT
Start: 2023-04-19 | End: 2023-04-21 | Stop reason: HOSPADM

## 2023-04-19 RX ADMIN — OXYCODONE HYDROCHLORIDE 5 MG: 5 TABLET ORAL at 01:25

## 2023-04-19 RX ADMIN — METOPROLOL SUCCINATE 25 MG: 25 TABLET, EXTENDED RELEASE ORAL at 08:35

## 2023-04-19 RX ADMIN — POLYETHYLENE GLYCOL 3350 17 G: 17 POWDER, FOR SOLUTION ORAL at 08:36

## 2023-04-19 RX ADMIN — ROSUVASTATIN CALCIUM 40 MG: 20 TABLET, FILM COATED ORAL at 20:03

## 2023-04-19 RX ADMIN — SENNOSIDES AND DOCUSATE SODIUM 1 TABLET: 50; 8.6 TABLET ORAL at 20:03

## 2023-04-19 RX ADMIN — FAMOTIDINE 20 MG: 20 TABLET ORAL at 08:35

## 2023-04-19 RX ADMIN — ASPIRIN 81 MG: 81 TABLET, COATED ORAL at 08:36

## 2023-04-19 RX ADMIN — CEFAZOLIN SODIUM 2 G: 2 INJECTION, SOLUTION INTRAVENOUS at 08:36

## 2023-04-19 RX ADMIN — CEFAZOLIN SODIUM 2 G: 2 INJECTION, SOLUTION INTRAVENOUS at 16:24

## 2023-04-19 RX ADMIN — ACETAMINOPHEN 975 MG: 325 TABLET ORAL at 12:25

## 2023-04-19 RX ADMIN — PRASUGREL 10 MG: 10 TABLET, FILM COATED ORAL at 08:36

## 2023-04-19 RX ADMIN — CEFAZOLIN SODIUM 2 G: 2 INJECTION, SOLUTION INTRAVENOUS at 01:25

## 2023-04-19 RX ADMIN — VANCOMYCIN HYDROCHLORIDE 750 MG: 1 INJECTION, POWDER, LYOPHILIZED, FOR SOLUTION INTRAVENOUS at 22:38

## 2023-04-19 RX ADMIN — FAMOTIDINE 20 MG: 20 TABLET ORAL at 20:03

## 2023-04-19 RX ADMIN — SENNOSIDES AND DOCUSATE SODIUM 1 TABLET: 50; 8.6 TABLET ORAL at 08:36

## 2023-04-19 RX ADMIN — ACETAMINOPHEN 975 MG: 325 TABLET ORAL at 20:03

## 2023-04-19 RX ADMIN — ACETAMINOPHEN 975 MG: 325 TABLET ORAL at 06:11

## 2023-04-19 RX ADMIN — EZETIMIBE 10 MG: 10 TABLET ORAL at 20:03

## 2023-04-19 ASSESSMENT — ACTIVITIES OF DAILY LIVING (ADL)
ADLS_ACUITY_SCORE: 22
ADLS_ACUITY_SCORE: 22
ADLS_ACUITY_SCORE: 28
ADLS_ACUITY_SCORE: 24
ADLS_ACUITY_SCORE: 24
ADLS_ACUITY_SCORE: 22
ADLS_ACUITY_SCORE: 24
ADLS_ACUITY_SCORE: 22
ADLS_ACUITY_SCORE: 28
ADLS_ACUITY_SCORE: 22
ADLS_ACUITY_SCORE: 24
ADLS_ACUITY_SCORE: 25

## 2023-04-19 NOTE — PROGRESS NOTES
[facility]    Infectious Disease Progress Note    Date of Service: 04/19/2023     Assessment:  72 YM with R TKA 8 years ago who has been hospitalized with acute onset symptoms about a week ago and synovial fluid analysis consistent with infection. Likely hematogenous infection, though blood cxs are negative. He is S/p right knee I&D with removal of prosthesis and placement of antibiotic spacer.Cxs are pending        Recommendations  1. Follow cx data- spoke with lab to hold anaerobic cxs for 2 weeks  2. Start ceftriaxone once post op doses of cefazolin have been completed.   3. Continue vancomycin. Will tentatively plan discharge on this regimen unless cxs turn positive in which case antibiotics will be modified accordingly. Will need to received first dose of ceftriaxone in the hospital prior to discharge  4. PICC in place  5. Antibiotic associated side effects and toxicities were discussed    OPIV antibiotic orders placed in epic until 5/29  ID follow up in 2-3 weeks        Nika Ortega MD    Interval History   Resting, feels better, pain improved, worked with therapy, tolerating antibiotics without side effects    Physical Exam   Temp: 98.4  F (36.9  C) Temp src: Oral BP: 131/67 Pulse: 77   Resp: 16 SpO2: 94 % O2 Device: None (Room air)    Vitals:    04/17/23 1010   Weight: 84.2 kg (185 lb 11.2 oz)     Vital Signs with Ranges  Temp:  [98.2  F (36.8  C)-98.6  F (37  C)] 98.4  F (36.9  C)  Pulse:  [77-85] 77  Resp:  [16] 16  BP: (129-148)/(67-75) 131/67  SpO2:  [94 %] 94 %    GENERAL APPEARANCE:  awake  EYES: Eyes grossly normal to inspectiony  RESP: lungs clear   CV: regular rates and rhythm  ABDOMEN: soft, nontender  MS: R knee in immobilizer, wound vac in place  SKIN: no suspicious lesions or rashes    Other:    Medications     lactated ringers 100 mL/hr at 04/18/23 2119       acetaminophen  975 mg Oral Q8H     aspirin  81 mg Oral Daily     ceFAZolin  2 g Intravenous Q8H     cefTRIAXone   2 g Intravenous Q24H     ezetimibe  10 mg Oral QPM     famotidine  20 mg Oral Daily    Or     famotidine  20 mg Intravenous Daily     metoprolol succinate ER  25 mg Oral Daily     polyethylene glycol  17 g Oral Daily     prasugrel  10 mg Oral Daily     rosuvastatin  40 mg Oral Daily     senna-docusate  1 tablet Oral BID     sodium chloride (PF)  10-40 mL Intracatheter Q7 Days     sodium chloride (PF)  3 mL Intracatheter Q8H     vancomycin  1,250 mg Intravenous Q24H       Data   All microbiology laboratory data reviewed.  Recent Labs   Lab Test 04/19/23  0618 04/18/23  0806 04/17/23  0957 05/15/20  0930   WBC  --  13.1*  --  6.1   HGB 10.4* 11.4* 14.0 15.5   HCT  --  35.0*  --  45.7   MCV  --  97  --  97   PLT  --  328  --  204     Recent Labs   Lab Test 04/19/23  0618 04/18/23  0806 04/17/23  0957   CR 1.16 1.35* 1.67*     Recent Labs   Lab Test 04/17/23  2150   SED 48*     Microbiology  4/17 r knee tissue cx pending  4/12 R knee synovial fluid cx negative

## 2023-04-19 NOTE — PROGRESS NOTES
"ORTHOPEDIC LOWER EXTREMITY PROGRESS NOTE    POD#2  Patient is a 72 year old male who underwent Procedure(s):  Right Total Knee Arthroplasty Removal, Replacement with Antibiotic Spacer on 2023. Pain is well controlled. Tolerating medication well, no nausea or vomiting.  Got hinged knee brace yesterday which is more comfortable.  No concerns today.    Vitals:   Blood pressure 139/69, pulse 85, temperature 97.3  F (36.3  C), temperature source Oral, resp. rate 16, height 1.854 m (6' 1\"), weight 84.2 kg (185 lb 11.2 oz), SpO2 97 %.  Temp (24hrs), Av.8  F (36.6  C), Min:97.4  F (36.3  C), Max:98.4  F (36.9  C)      Drains: None    EXAM   The patient is awake and alert.  Calves are soft and non-tender.   Sensation is intact.  Dorsiflexion and plantar flexion is intact.  Foot warm with nl cap refill.  The incision is covered with prevena wound vac, empty cannister.     Labs:   Recent Labs   Lab Test 23  0618 23  0806 23  0957 05/15/20  0930   HGB 10.4* 11.4* 14.0 15.5   INR  --   --   --  1.02     CX: NGTD  ASSESSMENT  S/p R knee explant and placement of antibiotic spacer   PLAN  1. DVT prophylaxis: aspirin and prasugrel  2. Weight Bearing: TDWB (Touch down weight bearing). in hinged knee brace locked in extension.   3. Anticipated discharge date 2-3 days pending antibiotic plan . Discharge to Home with Home Care for IV infusions  4. Cont Pain Control Oxycodone   5.  ID and medicine following as well., appreciate assistance.  Would like surgical cultures held for 2 weeks  6. Discontinue alfonso this morning    Gayatri Bonilla PA-C  [facility]        "

## 2023-04-19 NOTE — PROGRESS NOTES
[facility]    Received request for benefit check should pt require home IV abx. The patient has coverage for IV ABX in the home with [place] plan. Ded: $2550 (Met: $0 to date), Co-Insurance: 80/20, Max Out of Pocket: $7150 (Met: $77.77 to date). Once Max OOP is met for the year the patient should be covered at 100%    Addendum @ 1330: Met with pt at bedside to introduce home infusion services, review benefits and offer choice of providers. Pt would like to proceed with [facility] for home IV abx.     Thank you for the referral     Jessica Garcia RN  [facility] Liaison  [phone] (Mon thru Fri 8am - 5pm)  [phone] Office

## 2023-04-19 NOTE — PROGRESS NOTES
Therapy: IV ABX (Cefazolin & Ceftriaxone)  Insurance[place]  Ded: $2550  Met: $0    Co-Insurance: 80/20  Max Out of Pocket: $7150  Met: $77.77  Once Max OOP is met for the year the patient should be covered at 100%    The patient has coverage for IV ABX in the home.      Please contact Intake with any questions, [phone] or In Basket pool, [facility] ([phone]).

## 2023-04-19 NOTE — PROGRESS NOTES
Care Management Initial Consult    General Information  Assessment completed with: Patient, Sunny  Type of CM/SW Visit: Initial Assessment    Primary Care Provider verified and updated as needed: Yes   Readmission within the last 30 days: no previous admission in last 30 days      Reason for Consult: discharge planning  Advance Care Planning:            Communication Assessment  Patient's communication style: spoken language (English or Bilingual)    Hearing Difficulty or Deaf: no   Wear Glasses or Blind: yes    Cognitive  Cognitive/Neuro/Behavioral: WDL                      Living Environment:   People in home: spouse  Consuelo  Current living Arrangements: house      Able to return to prior arrangements: yes       Family/Social Support:  Care provided by: self, spouse/significant other  Provides care for: no one  Marital Status:   Wife          Description of Support System: Supportive, Involved         Current Resources:   Patient receiving home care services: No     Community Resources: None  Equipment currently used at home: none (raised toilets)  Supplies currently used at home: None    Employment/Financial:  Employment Status: employed full-time        Financial Concerns: No concerns identified           Lifestyle & Psychosocial Needs:  Social Determinants of Health     Tobacco Use: Low Risk  (4/18/2023)    Patient History      Smoking Tobacco Use: Never      Smokeless Tobacco Use: Never      Passive Exposure: Not on file   Alcohol Use: Not on file   Financial Resource Strain: Not on file   Food Insecurity: Not on file   Transportation Needs: Not on file   Physical Activity: Not on file   Stress: Not on file   Social Connections: Not on file   Intimate Partner Violence: Not on file   Depression: Not on file   Housing Stability: Not on file       Functional Status:  Prior to admission patient needed assistance:              Mental Health Status:          Chemical Dependency Status:                 Values/Beliefs:  Spiritual, Cultural Beliefs, Alevism Practices, Values that affect care:    Description of Beliefs that Will Affect Care: none            Additional Information:  Per consult for discharge planning, met with patient to discuss discharge planning.  Per pt, prior to admission he was working full time,driving and independent with his ADLs.  Discussed that patient will need IV abx at discharge and that PT is recommending home care PT.   Discussed that patient will be discharging home with a Prevena Wound VAC.  Explained to pt that his insurance was checked for home IV abx coverage through [facility] and per [facility] patient has coverage for IV ABX in the home with [place]. Ded: $2550 (Met: $0 to date), Co-Insurance: 80/20, Max Out of Pocket: $7150 (Met: $77.77 to date). Once Max OOP is met for the year the patient should be covered at 100%.  Patient stated understanding of this.  Patient was informed that Jessica Garcia RN Liaison from [facility] will be meeting with him to discuss  home infusion services, review benefits and offer choice of providers.   Patient was informed that [facility] will provide Home Care agency for PT services at home.  Patient was informed that he will need ID follow-up in 2-3 weeks.  Scheduled pt to see Dr. Ortega on May 18th at 9:40 AM.  Appt date & time placed on AVS.  Inpatient Care Coordinator will continue to follow for discharge planning.  SHANTAL Montes RN, BSN, OCN   Inpatient Care Coordination [facility]  Office: [phone]

## 2023-04-20 ENCOUNTER — APPOINTMENT (OUTPATIENT)
Dept: OCCUPATIONAL THERAPY | Facility: CLINIC | Age: 73
End: 2023-04-20
Attending: ORTHOPAEDIC SURGERY
Payer: COMMERCIAL

## 2023-04-20 ENCOUNTER — APPOINTMENT (OUTPATIENT)
Dept: PHYSICAL THERAPY | Facility: CLINIC | Age: 73
End: 2023-04-20
Attending: ORTHOPAEDIC SURGERY
Payer: COMMERCIAL

## 2023-04-20 LAB
CREAT SERPL-MCNC: 1.16 MG/DL (ref 0.67–1.17)
GFR SERPL CREATININE-BSD FRML MDRD: 68 ML/MIN/1.73M2

## 2023-04-20 PROCEDURE — 250N000011 HC RX IP 250 OP 636: Performed by: ORTHOPAEDIC SURGERY

## 2023-04-20 PROCEDURE — 97116 GAIT TRAINING THERAPY: CPT | Mod: GP

## 2023-04-20 PROCEDURE — 97530 THERAPEUTIC ACTIVITIES: CPT | Mod: GP

## 2023-04-20 PROCEDURE — 99232 SBSQ HOSP IP/OBS MODERATE 35: CPT | Performed by: SPECIALIST

## 2023-04-20 PROCEDURE — 250N000013 HC RX MED GY IP 250 OP 250 PS 637: Performed by: SPECIALIST/TECHNOLOGIST

## 2023-04-20 PROCEDURE — 250N000011 HC RX IP 250 OP 636: Performed by: SPECIALIST

## 2023-04-20 PROCEDURE — 82565 ASSAY OF CREATININE: CPT | Performed by: ORTHOPAEDIC SURGERY

## 2023-04-20 PROCEDURE — 250N000013 HC RX MED GY IP 250 OP 250 PS 637: Performed by: ORTHOPAEDIC SURGERY

## 2023-04-20 PROCEDURE — 250N000013 HC RX MED GY IP 250 OP 250 PS 637: Performed by: PHYSICIAN ASSISTANT

## 2023-04-20 PROCEDURE — 258N000003 HC RX IP 258 OP 636: Performed by: ORTHOPAEDIC SURGERY

## 2023-04-20 PROCEDURE — 120N000001 HC R&B MED SURG/OB

## 2023-04-20 PROCEDURE — 97535 SELF CARE MNGMENT TRAINING: CPT | Mod: GO | Performed by: OCCUPATIONAL THERAPIST

## 2023-04-20 RX ORDER — ACETAMINOPHEN 325 MG/1
650 TABLET ORAL EVERY 4 HOURS PRN
Qty: 100 TABLET | Refills: 0 | Status: SHIPPED | OUTPATIENT
Start: 2023-04-20 | End: 2023-07-19

## 2023-04-20 RX ORDER — OXYCODONE HYDROCHLORIDE 5 MG/1
5-10 TABLET ORAL EVERY 4 HOURS PRN
Qty: 20 TABLET | Refills: 0 | Status: SHIPPED | OUTPATIENT
Start: 2023-04-20 | End: 2023-07-19

## 2023-04-20 RX ORDER — AMOXICILLIN 250 MG
1-2 CAPSULE ORAL 2 TIMES DAILY
Qty: 30 TABLET | Refills: 0 | Status: SHIPPED | OUTPATIENT
Start: 2023-04-20 | End: 2023-07-19

## 2023-04-20 RX ORDER — LISINOPRIL 20 MG/1
20 TABLET ORAL DAILY
Status: DISCONTINUED | OUTPATIENT
Start: 2023-04-20 | End: 2023-04-21 | Stop reason: HOSPADM

## 2023-04-20 RX ORDER — ASPIRIN 81 MG/1
81 TABLET ORAL 2 TIMES DAILY
Qty: 60 TABLET | Refills: 0 | Status: SHIPPED | OUTPATIENT
Start: 2023-04-20 | End: 2023-07-19

## 2023-04-20 RX ADMIN — EZETIMIBE 10 MG: 10 TABLET ORAL at 20:52

## 2023-04-20 RX ADMIN — POLYETHYLENE GLYCOL 3350 17 G: 17 POWDER, FOR SOLUTION ORAL at 08:17

## 2023-04-20 RX ADMIN — SENNOSIDES AND DOCUSATE SODIUM 1 TABLET: 50; 8.6 TABLET ORAL at 20:52

## 2023-04-20 RX ADMIN — VANCOMYCIN HYDROCHLORIDE 750 MG: 1 INJECTION, POWDER, LYOPHILIZED, FOR SOLUTION INTRAVENOUS at 08:21

## 2023-04-20 RX ADMIN — LISINOPRIL 20 MG: 20 TABLET ORAL at 12:02

## 2023-04-20 RX ADMIN — ACETAMINOPHEN 975 MG: 325 TABLET ORAL at 12:02

## 2023-04-20 RX ADMIN — CEFTRIAXONE SODIUM 2 G: 2 INJECTION, POWDER, FOR SOLUTION INTRAMUSCULAR; INTRAVENOUS at 00:18

## 2023-04-20 RX ADMIN — ASPIRIN 81 MG: 81 TABLET, COATED ORAL at 08:18

## 2023-04-20 RX ADMIN — ACETAMINOPHEN 650 MG: 325 TABLET ORAL at 00:30

## 2023-04-20 RX ADMIN — FAMOTIDINE 20 MG: 20 TABLET ORAL at 20:52

## 2023-04-20 RX ADMIN — CEFTRIAXONE SODIUM 2 G: 2 INJECTION, POWDER, FOR SOLUTION INTRAMUSCULAR; INTRAVENOUS at 23:47

## 2023-04-20 RX ADMIN — PRASUGREL 10 MG: 10 TABLET, FILM COATED ORAL at 08:18

## 2023-04-20 RX ADMIN — SENNOSIDES AND DOCUSATE SODIUM 1 TABLET: 50; 8.6 TABLET ORAL at 08:18

## 2023-04-20 RX ADMIN — METOPROLOL SUCCINATE 25 MG: 25 TABLET, EXTENDED RELEASE ORAL at 08:18

## 2023-04-20 RX ADMIN — ACETAMINOPHEN 650 MG: 325 TABLET ORAL at 22:14

## 2023-04-20 RX ADMIN — VANCOMYCIN HYDROCHLORIDE 750 MG: 1 INJECTION, POWDER, LYOPHILIZED, FOR SOLUTION INTRAVENOUS at 22:00

## 2023-04-20 RX ADMIN — ROSUVASTATIN CALCIUM 40 MG: 20 TABLET, FILM COATED ORAL at 20:52

## 2023-04-20 RX ADMIN — FAMOTIDINE 20 MG: 20 TABLET ORAL at 08:18

## 2023-04-20 RX ADMIN — ACETAMINOPHEN 975 MG: 325 TABLET ORAL at 06:08

## 2023-04-20 ASSESSMENT — ACTIVITIES OF DAILY LIVING (ADL)
ADLS_ACUITY_SCORE: 24
ADLS_ACUITY_SCORE: 28
ADLS_ACUITY_SCORE: 24
ADLS_ACUITY_SCORE: 28
ADLS_ACUITY_SCORE: 28
ADLS_ACUITY_SCORE: 24

## 2023-04-20 NOTE — PROGRESS NOTES
"ORTHOPEDIC LOWER EXTREMITY PROGRESS NOTE    POD#3  Patient is a 72 year old male who underwent Procedure(s):  Right Total Knee Arthroplasty Removal, Replacement with Antibiotic Spacer on 2023. Pain is well controlled with just tylenol. Tolerating medication well, no nausea or vomiting. He was on lisinopril PTA and wonders when he will start back on this medication.  Discussed that cultures still showing no growth, awaiting final antibiotic recommendations before discharge    Vitals:   Blood pressure (!) 151/76, pulse 96, temperature 97.4  F (36.3  C), temperature source Oral, resp. rate 16, height 1.854 m (6' 1\"), weight 84.2 kg (185 lb 11.2 oz), SpO2 98 %.  Temp (24hrs), Av.8  F (36.6  C), Min:97.4  F (36.3  C), Max:98.4  F (36.9  C)      Drains: None    EXAM   The patient is awake and alert.  Calves are soft and non-tender.   Sensation is intact.  Dorsiflexion and plantar flexion is intact.  Foot warm with nl cap refill.  The incision is covered with prevena wound vac, empty cannister.   Hinged knee brace on and locked in extension    Labs:   Recent Labs   Lab Test 23  0618 23  0806 23  0957 05/15/20  0930   HGB 10.4* 11.4* 14.0 15.5   INR  --   --   --  1.02     CX: NGTD  ASSESSMENT  S/p R knee explant and placement of antibiotic spacer   Hypertension  PLAN  1. DVT prophylaxis: aspirin and prasugrel  2. Weight Bearing: TDWB (Touch down weight bearing). in hinged knee brace locked in extension.   3. Anticipated discharge date pending antibiotic plan . Discharge to Home with Home Care for IV infusions  4. Cont Pain Control Tylenol and Oxycodone available if needed  5.  ID following appreciate assistance  6. Hypertension - Lisinopril restarted today    Gayatri Bonilla PA-C  [facility]        "

## 2023-04-20 NOTE — PROGRESS NOTES
[facility]    Infectious Disease Progress Note    Date of Service : 04/20/2023       Assessment:  72 YM with R TKA 8 years ago who has been hospitalized with acute onset symptoms about a week ago and synovial fluid analysis consistent with infection. Likely hematogenous infection, though blood cxs are negative. He is S/p right knee I&D with removal of prosthesis and placement of antibiotic spacer.Cxs are with no growth thus far        Recommendations  1. Follow cx data- spoke with lab to hold anaerobic cxs for 2 weeks  2. Treat with ceftriaxone and vancomycin. Will tentatively plan discharge on this regimen unless cxs turn positive in which case antibiotics will be modified accordingly.   3. Antibiotic associated side effects and toxicities were discussed     OPIV antibiotic orders placed in epic until 5/29  ID follow up in 2-3 weeks    ID will follow peripherally  Please notify ID if cxs turn positive for additional recommendations      Nika Ortega MD    Interval History   No new complaints, remains stable, cxs are negative thus far. Tolerating antibiotics without side effects    Physical Exam   Temp: 97.3  F (36.3  C) Temp src: Axillary BP: 126/76 Pulse: 82   Resp: 16 SpO2: 98 % O2 Device: None (Room air)    Vitals:    04/17/23 1010   Weight: 84.2 kg (185 lb 11.2 oz)     Vital Signs with Ranges  Temp:  [97.3  F (36.3  C)-97.7  F (36.5  C)] 97.3  F (36.3  C)  Pulse:  [79-96] 82  Resp:  [16] 16  BP: (126-151)/(74-76) 126/76  SpO2:  [96 %-98 %] 98 %    GENERAL APPEARANCE:  awake  EYES: Eyes grossly normal to inspectiony  RESP: lungs clear   CV: regular rates and rhythm  ABDOMEN: soft, nontender  MS: R knee in immobilizer, wound vac in place  SKIN: no suspicious lesions or rashes    Other:    Medications     lactated ringers 100 mL/hr at 04/18/23 2119       aspirin  81 mg Oral Daily     cefTRIAXone  2 g Intravenous Q24H     ezetimibe  10 mg Oral QPM     famotidine  20 mg Oral Q12H    Or      famotidine  20 mg Intravenous Q12H     lisinopril  20 mg Oral Daily     metoprolol succinate ER  25 mg Oral Daily     polyethylene glycol  17 g Oral Daily     prasugrel  10 mg Oral Daily     rosuvastatin  40 mg Oral Daily     senna-docusate  1 tablet Oral BID     sodium chloride (PF)  10-40 mL Intracatheter Q7 Days     sodium chloride (PF)  3 mL Intracatheter Q8H     vancomycin  750 mg Intravenous Q12H       Data   All microbiology laboratory data reviewed.  Recent Labs   Lab Test 04/19/23  0618 04/18/23  0806 04/17/23  0957 05/15/20  0930   WBC  --  13.1*  --  6.1   HGB 10.4* 11.4* 14.0 15.5   HCT  --  35.0*  --  45.7   MCV  --  97  --  97   PLT  --  328  --  204     Recent Labs   Lab Test 04/20/23  0610 04/19/23  0618 04/18/23  0806   CR 1.16 1.16 1.35*     Recent Labs   Lab Test 04/17/23  2150   SED 48*     Microbiology  4/17 r knee tissue cx pending  4/12 R knee synovial fluid cx negative

## 2023-04-20 NOTE — PHARMACY-VANCOMYCIN DOSING SERVICE
Pharmacy Vancomycin Note  Date of Service 2023  Patient's  1950   72 year old, male    Indication: Bone and Joint Infection  Day of Therapy: 2  Current vancomycin regimen:  1250 mg IV q24h  Current vancomycin monitoring method: AUC  Current vancomycin therapeutic monitoring goal: 400-600 mg*h/L    InsightRX Prediction of Current Vancomycin Regimen  Regimen: 1250 mg IV every 24 hours.  Start time: 23:03 on 2023  Exposure target: AUC24 (range)400-600 mg/L.hr   AUC24,ss: 356 mg/L.hr  Probability of AUC24 > 400: 29 %  Ctrough,ss: 9.8 mg/L  Probability of Ctrough,ss > 20: 2 %  Probability of nephrotoxicity (Lodise YRN ): 6 %      Current estimated CrCl = Estimated Creatinine Clearance: 70.6 mL/min (based on SCr of 1.16 mg/dL).    Creatinine for last 3 days  2023:  9:57 AM Creatinine 1.67 mg/dL  2023:  8:06 AM Creatinine 1.35 mg/dL  2023:  6:18 AM Creatinine 1.16 mg/dL    Recent Vancomycin Levels (past 3 days)  2023:  6:45 PM Vancomycin 6.9 ug/mL    Vancomycin IV Administrations (past 72 hours)                   vancomycin (VANCOCIN) 1,250 mg in 0.9% NaCl 250 mL intermittent infusion (mg) 1,250 mg New Bag 23 2303     1,250 mg New Bag 23 2314                Nephrotoxins and other renal medications (From now, onward)    Start     Dose/Rate Route Frequency Ordered Stop    23 0000  vancomycin (VANCOCIN) 1250 mg/250 mL IVPB        Note to Pharmacy: Check weekly CBC/diff and twice weekly creatinine, vancomycin level  Results to intermed consultants Dr Ortega    1,250 mg Intravenous EVERY 24 HOURS 23 1041 23 2359    23 2100  vancomycin (VANCOCIN) 1,250 mg in 0.9% NaCl 250 mL intermittent infusion         1,250 mg  over 90 Minutes Intravenous EVERY 24 HOURS 23 2030               Contrast Orders - past 72 hours (72h ago, onward)    None          Interpretation of levels and current regimen:  Vancomycin level is reflective of AUC less than  400    Has serum creatinine changed greater than 50% in last 72 hours: Yes    Urine output:  unable to determine    Renal Function: Improving    InsightRX Prediction of Planned New Vancomycin Regimen  Regimen: 750 mg IV every 12 hours.  Start time: 23:03 on 04/19/2023  Exposure target: AUC24 (range)400-600 mg/L.hr   AUC24,ss: 421 mg/L.hr  Probability of AUC24 > 400: 60 %  Ctrough,ss: 14 mg/L  Probability of Ctrough,ss > 20: 10 %  Probability of nephrotoxicity (Lodise YRN 2009): 9 %      Plan:  1. Increase Dose to 750mg IV q12h  2. Vancomycin monitoring method: AUC  3. Vancomycin therapeutic monitoring goal: 400-600 mg*h/L  4. Pharmacy will check vancomycin levels as appropriate in 1-3 Days.  5. Serum creatinine levels will be ordered daily for the first week of therapy and at least twice weekly for subsequent weeks.    Nenita Lee, PharmD

## 2023-04-21 VITALS
WEIGHT: 185.7 LBS | BODY MASS INDEX: 24.61 KG/M2 | HEIGHT: 73 IN | TEMPERATURE: 97.4 F | RESPIRATION RATE: 16 BRPM | HEART RATE: 82 BPM | SYSTOLIC BLOOD PRESSURE: 114 MMHG | OXYGEN SATURATION: 97 % | DIASTOLIC BLOOD PRESSURE: 64 MMHG

## 2023-04-21 LAB
CREAT SERPL-MCNC: 1.06 MG/DL (ref 0.67–1.17)
GFR SERPL CREATININE-BSD FRML MDRD: 75 ML/MIN/1.73M2

## 2023-04-21 PROCEDURE — 250N000013 HC RX MED GY IP 250 OP 250 PS 637: Performed by: ORTHOPAEDIC SURGERY

## 2023-04-21 PROCEDURE — 250N000013 HC RX MED GY IP 250 OP 250 PS 637: Performed by: SPECIALIST/TECHNOLOGIST

## 2023-04-21 PROCEDURE — 258N000003 HC RX IP 258 OP 636: Performed by: ORTHOPAEDIC SURGERY

## 2023-04-21 PROCEDURE — 250N000013 HC RX MED GY IP 250 OP 250 PS 637: Performed by: PHYSICIAN ASSISTANT

## 2023-04-21 PROCEDURE — 250N000011 HC RX IP 250 OP 636: Performed by: ORTHOPAEDIC SURGERY

## 2023-04-21 PROCEDURE — 82565 ASSAY OF CREATININE: CPT | Performed by: ORTHOPAEDIC SURGERY

## 2023-04-21 RX ADMIN — VANCOMYCIN HYDROCHLORIDE 750 MG: 1 INJECTION, POWDER, LYOPHILIZED, FOR SOLUTION INTRAVENOUS at 08:21

## 2023-04-21 RX ADMIN — PRASUGREL 10 MG: 10 TABLET, FILM COATED ORAL at 08:19

## 2023-04-21 RX ADMIN — ASPIRIN 81 MG: 81 TABLET, COATED ORAL at 08:19

## 2023-04-21 RX ADMIN — FAMOTIDINE 20 MG: 20 TABLET ORAL at 08:19

## 2023-04-21 RX ADMIN — POLYETHYLENE GLYCOL 3350 17 G: 17 POWDER, FOR SOLUTION ORAL at 08:19

## 2023-04-21 RX ADMIN — METOPROLOL SUCCINATE 25 MG: 25 TABLET, EXTENDED RELEASE ORAL at 08:19

## 2023-04-21 RX ADMIN — SENNOSIDES AND DOCUSATE SODIUM 1 TABLET: 50; 8.6 TABLET ORAL at 08:19

## 2023-04-21 RX ADMIN — LISINOPRIL 20 MG: 20 TABLET ORAL at 08:19

## 2023-04-21 ASSESSMENT — ACTIVITIES OF DAILY LIVING (ADL)
ADLS_ACUITY_SCORE: 24

## 2023-04-21 NOTE — PROGRESS NOTES
Home Infusion  Sunny will be discharging today and going home on IV vancomycin and ceftriaxone.  I met with Sunny and his wife,  Consuelo at bedside and instructed in IV administration via  PICC with SAS flushing.   Had Sunny and Consuelo perform hands on with practice equipment and teaching sheets. Provided information about supplies and supply delivery, storage of medication, checking of label, dosing times, plan for SNV and 24/7 availability of [facility] staff. [facility] will follow for home RN.   Sunny and Consuelo verbalized understanding of information given. They both demonstrated very good technique with practice and verbalized good understanding of process.  Stated they feel comfortable with administering abx later tonight.   Dosing schedule: Pt will dose IV vancomycin q12 at 0800/2000 and daily ceftriaxone before PM vancomycin dose.   Delivery: medications and supplies will be delivered to pt's home today prior to first home dose.   Sunny is ready for discharge from [facility] perspective.    Jessica Garcia RN  [facility] Liaison  [phone] (M-F 8a-5p) [phone] Office

## 2023-04-21 NOTE — PROGRESS NOTES
Pt is A&O x4. Up with assist of 1GB and walker. Right knee dressing and ace Wrap CDI. Hinged brace and wound Vac in place. Void adequately using urinal  and bathroom. Schedule Tylenol for pain management. Denied pain at this time. Will continue to monitor. .

## 2023-04-21 NOTE — PROGRESS NOTES
"ORTHOPEDIC LOWER EXTREMITY PROGRESS NOTE    POD#4  Patient is a 72 year old male who underwent Procedure(s):  Right Total Knee Arthroplasty Removal, Replacement with Antibiotic Spacer on 2023. Pain is well controlled with just tylenol. Tolerating medication well, no nausea or vomiting. Discussed that cultures still showing no growth, ID plans for ceftriaxone and vancomycin on discharge.     Vitals:   Blood pressure 114/64, pulse 82, temperature 97.4  F (36.3  C), temperature source Oral, resp. rate 16, height 1.854 m (6' 1\"), weight 84.2 kg (185 lb 11.2 oz), SpO2 97 %.  Temp (24hrs), Av.8  F (36.6  C), Min:97.4  F (36.3  C), Max:98.4  F (36.9  C)      Drains: prevena    EXAM   The patient is awake and alert.  Calves are soft and non-tender.   Sensation is intact.  Dorsiflexion and plantar flexion is intact.  Foot warm with nl cap refill.  The incision is covered with prevena wound vac, empty cannister.   Hinged knee brace on and locked in extension    Labs:   Recent Labs   Lab Test 23  0618 23  0806 23  0957 05/15/20  0930   HGB 10.4* 11.4* 14.0 15.5   INR  --   --   --  1.02     CX: NGTD  ASSESSMENT  S/p R knee explant and placement of antibiotic spacer   Hypertension  PLAN  1. DVT prophylaxis: aspirin and prasugrel  2. Weight Bearing: TDWB (Touch down weight bearing). in hinged knee brace locked in extension.   3. Anticipated discharge date pending antibiotic plan . Discharge to Home with Home Care for IV infusions  4. Cont Pain Control Tylenol and Oxycodone available if needed  5.  ID following appreciate assistance    Kjerstin Foss PA-C [facility]          "

## 2023-04-21 NOTE — PROGRESS NOTES
A&O, VSS on RA, pain in control, A1 walker, voiding adequately, discharge instruction done, wound vac switched, questions encouraged and answered, both wife and patient acknowledged understanding.

## 2023-04-22 LAB
BACTERIA SNV CULT: NO GROWTH
BACTERIA TISS BX CULT: NO GROWTH

## 2023-04-25 ENCOUNTER — LAB REQUISITION (OUTPATIENT)
Dept: LAB | Facility: CLINIC | Age: 73
End: 2023-04-25
Payer: COMMERCIAL

## 2023-04-25 DIAGNOSIS — T84.53XA INFECTION AND INFLAMMATORY REACTION DUE TO INTERNAL RIGHT KNEE PROSTHESIS, INITIAL ENCOUNTER (H): ICD-10-CM

## 2023-04-25 LAB
AST SERPL W P-5'-P-CCNC: 47 U/L (ref 10–50)
BASOPHILS # BLD AUTO: 0 10E3/UL (ref 0–0.2)
BASOPHILS NFR BLD AUTO: 0 %
CREAT SERPL-MCNC: 1.18 MG/DL (ref 0.67–1.17)
CRP SERPL-MCNC: 43.72 MG/L
EOSINOPHIL # BLD AUTO: 0.2 10E3/UL (ref 0–0.7)
EOSINOPHIL NFR BLD AUTO: 3 %
ERYTHROCYTE [DISTWIDTH] IN BLOOD BY AUTOMATED COUNT: 12.5 % (ref 10–15)
GFR SERPL CREATININE-BSD FRML MDRD: 66 ML/MIN/1.73M2
HCT VFR BLD AUTO: 34.6 % (ref 40–53)
HGB BLD-MCNC: 11.2 G/DL (ref 13.3–17.7)
IMM GRANULOCYTES # BLD: 0 10E3/UL
IMM GRANULOCYTES NFR BLD: 0 %
LYMPHOCYTES # BLD AUTO: 1.3 10E3/UL (ref 0.8–5.3)
LYMPHOCYTES NFR BLD AUTO: 14 %
MCH RBC QN AUTO: 31.5 PG (ref 26.5–33)
MCHC RBC AUTO-ENTMCNC: 32.4 G/DL (ref 31.5–36.5)
MCV RBC AUTO: 98 FL (ref 78–100)
MONOCYTES # BLD AUTO: 0.7 10E3/UL (ref 0–1.3)
MONOCYTES NFR BLD AUTO: 8 %
NEUTROPHILS # BLD AUTO: 6.9 10E3/UL (ref 1.6–8.3)
NEUTROPHILS NFR BLD AUTO: 75 %
NRBC # BLD AUTO: 0 10E3/UL
NRBC BLD AUTO-RTO: 0 /100
PLATELET # BLD AUTO: 370 10E3/UL (ref 150–450)
RBC # BLD AUTO: 3.55 10E6/UL (ref 4.4–5.9)
VANCOMYCIN SERPL-MCNC: 6.5 UG/ML
WBC # BLD AUTO: 9.2 10E3/UL (ref 4–11)

## 2023-04-25 PROCEDURE — 82565 ASSAY OF CREATININE: CPT | Performed by: SPECIALIST

## 2023-04-25 PROCEDURE — 85004 AUTOMATED DIFF WBC COUNT: CPT | Performed by: SPECIALIST

## 2023-04-25 PROCEDURE — 86140 C-REACTIVE PROTEIN: CPT | Performed by: SPECIALIST

## 2023-04-25 PROCEDURE — 84450 TRANSFERASE (AST) (SGOT): CPT | Performed by: SPECIALIST

## 2023-04-25 PROCEDURE — 80202 ASSAY OF VANCOMYCIN: CPT | Performed by: SPECIALIST

## 2023-04-26 LAB — BACTERIA SNV CULT: NORMAL

## 2023-04-28 ENCOUNTER — LAB REQUISITION (OUTPATIENT)
Dept: LAB | Facility: CLINIC | Age: 73
End: 2023-04-28
Payer: COMMERCIAL

## 2023-04-28 DIAGNOSIS — T84.53XA INFECTION AND INFLAMMATORY REACTION DUE TO INTERNAL RIGHT KNEE PROSTHESIS, INITIAL ENCOUNTER (H): ICD-10-CM

## 2023-04-28 LAB
CREAT SERPL-MCNC: 1.31 MG/DL (ref 0.67–1.17)
GFR SERPL CREATININE-BSD FRML MDRD: 59 ML/MIN/1.73M2
VANCOMYCIN SERPL-MCNC: 6.9 UG/ML

## 2023-04-28 PROCEDURE — 80202 ASSAY OF VANCOMYCIN: CPT | Performed by: SPECIALIST

## 2023-04-28 PROCEDURE — 82565 ASSAY OF CREATININE: CPT | Performed by: SPECIALIST

## 2023-05-01 ENCOUNTER — LAB REQUISITION (OUTPATIENT)
Dept: LAB | Facility: CLINIC | Age: 73
End: 2023-05-01
Payer: COMMERCIAL

## 2023-05-01 DIAGNOSIS — T84.53XA INFECTION AND INFLAMMATORY REACTION DUE TO INTERNAL RIGHT KNEE PROSTHESIS, INITIAL ENCOUNTER (H): ICD-10-CM

## 2023-05-01 LAB
BACTERIA SNV CULT: NORMAL
BACTERIA TISS BX CULT: NORMAL
BASOPHILS # BLD AUTO: 0.1 10E3/UL (ref 0–0.2)
BASOPHILS NFR BLD AUTO: 1 %
CREAT SERPL-MCNC: 1.32 MG/DL (ref 0.67–1.17)
EOSINOPHIL # BLD AUTO: 0.2 10E3/UL (ref 0–0.7)
EOSINOPHIL NFR BLD AUTO: 2 %
ERYTHROCYTE [DISTWIDTH] IN BLOOD BY AUTOMATED COUNT: 13.5 % (ref 10–15)
GFR SERPL CREATININE-BSD FRML MDRD: 58 ML/MIN/1.73M2
HCT VFR BLD AUTO: 35.8 % (ref 40–53)
HGB BLD-MCNC: 11.8 G/DL (ref 13.3–17.7)
IMM GRANULOCYTES # BLD: 0 10E3/UL
IMM GRANULOCYTES NFR BLD: 0 %
LYMPHOCYTES # BLD AUTO: 1.5 10E3/UL (ref 0.8–5.3)
LYMPHOCYTES NFR BLD AUTO: 16 %
MCH RBC QN AUTO: 32.7 PG (ref 26.5–33)
MCHC RBC AUTO-ENTMCNC: 33 G/DL (ref 31.5–36.5)
MCV RBC AUTO: 99 FL (ref 78–100)
MONOCYTES # BLD AUTO: 0.6 10E3/UL (ref 0–1.3)
MONOCYTES NFR BLD AUTO: 6 %
NEUTROPHILS # BLD AUTO: 7.6 10E3/UL (ref 1.6–8.3)
NEUTROPHILS NFR BLD AUTO: 75 %
NRBC # BLD AUTO: 0 10E3/UL
NRBC BLD AUTO-RTO: 0 /100
PLATELET # BLD AUTO: 355 10E3/UL (ref 150–450)
RBC # BLD AUTO: 3.61 10E6/UL (ref 4.4–5.9)
VANCOMYCIN SERPL-MCNC: 12.9 UG/ML
WBC # BLD AUTO: 10 10E3/UL (ref 4–11)

## 2023-05-01 PROCEDURE — 85018 HEMOGLOBIN: CPT | Performed by: SPECIALIST

## 2023-05-01 PROCEDURE — 82565 ASSAY OF CREATININE: CPT | Performed by: SPECIALIST

## 2023-05-01 PROCEDURE — 80202 ASSAY OF VANCOMYCIN: CPT | Performed by: SPECIALIST

## 2023-05-04 ENCOUNTER — LAB REQUISITION (OUTPATIENT)
Dept: LAB | Facility: CLINIC | Age: 73
End: 2023-05-04
Payer: COMMERCIAL

## 2023-05-04 DIAGNOSIS — T84.53XA INFECTION AND INFLAMMATORY REACTION DUE TO INTERNAL RIGHT KNEE PROSTHESIS, INITIAL ENCOUNTER (H): ICD-10-CM

## 2023-05-04 LAB
CREAT SERPL-MCNC: 1.3 MG/DL (ref 0.67–1.17)
GFR SERPL CREATININE-BSD FRML MDRD: 59 ML/MIN/1.73M2
VANCOMYCIN SERPL-MCNC: 14.6 UG/ML

## 2023-05-04 PROCEDURE — 80202 ASSAY OF VANCOMYCIN: CPT | Performed by: SPECIALIST

## 2023-05-04 PROCEDURE — 82565 ASSAY OF CREATININE: CPT | Performed by: SPECIALIST

## 2023-05-05 ENCOUNTER — TELEPHONE (OUTPATIENT)
Dept: CARDIOLOGY | Facility: CLINIC | Age: 73
End: 2023-05-05
Payer: COMMERCIAL

## 2023-05-05 DIAGNOSIS — I25.10 CORONARY ARTERY DISEASE INVOLVING NATIVE CORONARY ARTERY OF NATIVE HEART WITHOUT ANGINA PECTORIS: ICD-10-CM

## 2023-05-05 NOTE — TELEPHONE ENCOUNTER
evangelist message received. RN will send to Dr. Hartmann for further review and recommendation.    Patient's current cardiac/BP meds:    Lisinopril 20mg daily  Metoprolol succinate ER 25mg daily      Dr. Hartmann,   I have run into issues with my right artificial knee. An infection set in and on Monday 4/17 it was removed. The infection is being treated with antibiotics - Ceftriaxone 2000mg once daily and Vancomycin 1000mg twice daily. When I left the hospital on Friday 4/21 my blood pressure was 104 after restarting Lisinopril. Since Tuesday 4/25 a Home Infusion Nurse has taken my blood pressure Tuesdays and Thursday. These readings have been in the low 90s. I take my blood pressure medicine 1st thing in the mornings prior to the nurse arriving.  She said  that because it has been consistently below 110; I should discuss this with you. Please advise.   Thank you   Sunny

## 2023-05-05 NOTE — DISCHARGE SUMMARY
[facility]    Discharge Summary  Orthopedics    Date of Admission:  4/17/2023  Date of Discharge:  4/21/2023  1:17 PM  Discharging Provider: Kjerstin L Foss, PA-C    Discharge Diagnoses   Patient Active Problem List   Diagnosis     Hyperlipidemia LDL goal <70     Benign hypertension     STEMI (ST elevation myocardial infarction) (H)     Chest pain     Coronary artery disease involving native coronary artery of native heart without angina pectoris     Hypercholesterolemia     Ischemic cardiomyopathy     Hyperlipidemia LDL goal <100     ST elevation myocardial infarction (STEMI), unspecified artery (H)     Coronary artery disease involving native coronary artery of native heart, angina presence unspecified     Status post coronary angiogram     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)       Procedure/Surgery Information   Procedure: Procedure(s):  Right Total Knee Arthroplasty Removal, Replacement with Antibiotic Spacer   Surgeon(s): Surgeon(s) and Role:     * Anseth, Scott Duane, MD - Primary     * Nikole Randall PA-C - Assisting   Specimens: ID Type Source Tests Collected by Time Destination   A : right knee synovial fluid Synovial fluid Knee, Right ANAEROBIC BACTERIAL CULTURE ROUTINE, GRAM STAIN, AEROBIC BACTERIAL CULTURE ROUTINE Anseth, Scott Duane, MD 4/17/2023  1:55 PM    B : right knee medial gutter Tissue Knee, Right ANAEROBIC BACTERIAL CULTURE ROUTINE, GRAM STAIN, AEROBIC BACTERIAL CULTURE ROUTINE Anseth, Scott Duane, MD 4/17/2023  2:01 PM    C : right knee suprapatellar pouch Tissue Knee, Right ANAEROBIC BACTERIAL CULTURE ROUTINE, GRAM STAIN, AEROBIC BACTERIAL CULTURE ROUTINE Anseth, Scott Duane, MD 4/17/2023  2:06 PM    D : right knee posterior membrane Tissue Knee, Right ANAEROBIC BACTERIAL CULTURE ROUTINE, GRAM STAIN, AEROBIC BACTERIAL CULTURE ROUTINE Anseth, Scott Duane, MD 4/17/2023  2:22 PM       Non-operative procedures None performed     History of Present  Illness   Bridger Castillo is a very pleasant 72-year-old male who underwent a right total knee replacement approximately 8 years ago.  He has really been very satisfied and pain free until last week when he came in with increasing swelling and pain.  His aspirate showed 22,000 WBCs in the synovial fluid as well as 90% neutrophils.  His serum D-dimer is elevated at 1.3, approximately.  After discussing risks, benefits, possible complications, treatment and alternatives, we decided to come to the operating room for exploration, probable removal of the prosthesis and placement of high-dose antibiotic spacers.  His symptoms have been perhaps lasting as long as 3 weeks.    Hospital Course   Bridger Castillo was admitted on 4/17/2023.  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     Medications discontinued or adjusted during this hospitalization: New narcotics initiated:  tylenol     Antibiotics prescribed at discharge: ceftriaxone and vanco per ID     Imaging study follow up needs:   -None performed        Kjerstin L Foss, PA-C    Discharge Disposition   Discharged to home with home infusions  Condition at discharge: Stable    Pending Results   Final pathology results: No pathology submitted  These results will be followed up by ID  Unresulted Labs Ordered in the Past 30 Days of this Admission     No orders found from 3/18/2023 to 4/18/2023.          Primary Care Physician   GAURAV CARNEY        Consultations This Hospital Stay   PHARMACY TO DOSE VANCO  PHYSICAL THERAPY ADULT IP CONSULT  HOSPITALIST IP CONSULT  CARE MANAGEMENT / SOCIAL WORK IP CONSULT  OCCUPATIONAL THERAPY ADULT IP CONSULT  INFECTIOUS DISEASES IP CONSULT  ORTHOSIS BRACE IP CONSULT  VASCULAR ACCESS ADULT IP CONSULT  CARE MANAGEMENT /  "SOCIAL WORK IP CONSULT    Time Spent on this Encounter   I have spent less than 30 minutes on this discharge.    Discharge Orders      Home Infusion Referral      Follow-up and recommended labs and tests     You are scheduled to see  on May 18th at 9:40 AM at [facility].  [address] [address]   [phone]     Reason for your hospital stay    S/p knee explant and placement of antibiotic spacer     When to call - Contact Surgeon Team    You may experience symptoms that require follow-up before your scheduled appointment. Refer to the \"Stoplight Tool\" for instructions on when to contact your Surgeon Team if you are concerned about pain control, blood clots, constipation, or if you are unable to urinate.     When to call - Reach out to Urgent Care    If you are not able to reach your Surgeon Team and you need immediate care, go to the Orthopedic Walk-in Clinic or Urgent Care at your Surgeon's office.  Do NOT go to the Emergency Room unless you have shortness of breath, chest pain, or other signs of a medical emergency.     When to call - Reasons to Call 911    Call 911 immediately if you experience sudden-onset chest pain, arm weakness/numbness, slurred speech, or shortness of breath     Discharge Instruction - Breathing exercises    Perform breathing exercises using your Incentive Spirometer 10 times per hour while awake for 2 weeks.     Symptoms - Fever Management    A low grade fever can be expected after surgery.  Use acetaminophen (TYLENOL) as needed for fever management.  Contact your Surgeon Team if you have a fever greater than 101.5 F, chills, and/or night sweats.     Symptoms - Constipation management    Constipation (hard, dry bowel movements) is expected after surgery due to the combination of being less active, the anesthetic, and the opioid pain medication.  You can do the following to help reduce constipation:  ~  FLUIDS:  Drink clear liquids (water or Gatorade), " or juice (apple/prune).  ~  DIET:  Eat a fiber rich diet.    ~  ACTIVITY:  Get up and move around several times a day.  Increase your activity as you are able.  MEDICATIONS:  Reduce the risk of constipation by starting medications before you are constipated.  You can take Miralax   (1 packet as directed) and/or a stool softener (Senokot 1-2 tablets 1-2 times a day).  If you already have constipation and these medications are not working, you can get magnesium citrate and use as directed.  If you continue to have constipation you can try an over the counter suppository or enema.  Call your Surgeon Team if it has been greater than 3 days since your last bowel movement.     Symptoms - Reduced Urine Output    Changes in the amount of fluids you drank before and after surgery may result in problems urinating.  It is important to stay well-hydrated after surgery and drink plenty of water. If it has been greater than 8 hours since you have urinated despite drinking plenty of water, call your Surgeon Team.     Activity - Exercises to prevent blood clots    Unless otherwise directed by your Surgeon team, perform the following exercises at least three times per day for the first four weeks after surgery to prevent blood clots in your legs: 1) Point and flex your feet (Ankle Pumps), 2) Move your ankle around in big circles, 3) Wiggle your toes, 4) Walk, even for short distances, several times a day, will help decrease the risk of blood clots.     Comfort and Pain Management - Pain after Surgery    Pain after surgery is normal and expected.  You will have some amount of pain for several weeks after surgery.  Your pain will improve with time.  There are several things you can do to help reduce your pain including: rest, ice, elevation, and using pain medications as needed. Contact your Surgeon Team if you have pain that persists or worsens after surgery despite rest, ice, elevation, and taking your medication(s) as prescribed.  Contact your Surgeon Team if you have new numbness, tingling, or weakness in your operative extremity.     Comfort and Pain Management - Swelling after Surgery    Swelling and/or bruising of the surgical extremity is common and may persist for several months after surgery. In addition to frequent icing and elevation, gentle compressive support with an ACE wrap or tubigrip may help with swelling. Apply compression regularly, removing at least twice daily to perform skin checks. Contact your Surgeon Team if your swelling increases and is NOT associated with an increase in your activity level, or if your swelling increases and is associated with redness and pain.     Comfort and Pain Management - Cold therapy    Ice can be used to control swelling and discomfort after surgery. Place a thin towel over your operative site and apply the ice pack overtop. Leave ice pack in place for 20 minutes, then remove for 20 minutes. Repeat this 20 minutes on/20 minutes off routine as often as tolerated.     Medication Instructions - Acetaminophen (TYLENOL) Instructions    You were discharged with acetaminophen (TYLENOL) for pain management after surgery. Acetaminophen most effectively manages pain symptoms when it is taken on a schedule without missing doses (every four, six, or eight hours). Your Provider will prescribe a safe daily dose between 3000 - 4000 mg.  Do NOT exceed this daily dose. Most patients use acetaminophen for pain control for the first four weeks after surgery.  You can wean from this medication as your pain decreases.     Medication Instructions - NSAID Instructions    You were discharged with an anti-inflammatory medication for pain management to use in combination with acetaminophen (TYLENOL) and the narcotic pain medication.  Take this medication exactly as directed.  You should only take one anti-inflammatory at a time.  Some common anti-inflammatories include: ibuprofen (ADVIL, MOTRIN), naproxen (ALEVE,  "NAPROSYN), celecoxib (CELEBREX), meloxicam (MOBIC), ketorolac (TORADOL).  Take this medication with food and water.     Medication Instructions - Opioids - Tapering Instructions    In the first three days following surgery, your symptoms may warrant use of the narcotic pain medication every four to six hours as prescribed. This is normal. As your pain symptoms improve, focus your efforts on decreasing (tapering) use of narcotic medications. The most successful tapering strategy is to first, decrease the number of tablets you take every 4-6 hours to the minimum prescribed. Then, increase the amount of time between doses.  For example:  First, taper to   or 1 tablet every 4-6 hours.  Then, taper to   or 1 tablet every 6-8 hours.  Then, taper to   or 1 tablet every 8-10 hours.  Then, taper to   or 1 tablet every 10-12 hours.  Then, taper to   or 1 tablet at bedtime.  The bedtime dose can help with comfort during sleep and is typically the last dose to be discontinued after surgery.     Follow Up Care    Follow-up with your Surgeon Team in 2 weeks for wound check.     Medication instructions -  Anticoagulation - aspirin    Take the aspirin as prescribed for a total of four weeks after surgery.  This is given to help minimize your risk of blood clot.     Comfort and Pain Management - LOWER Extremity Elevation    Swelling is expected for several months after surgery. This type of swelling is usually associated with gravity and activity, and can be improved with elevation.   The best way to do this is to get your \"toes above your nose\" by laying down and placing several pillows lengthwise under your calf and heel. When elevating your leg keep your knee completely straight. Perform this elevation as often as possible especially for the first two weeks after surgery.     Medication Instructions - Opioid Instructions (1 - 2 tablets Q 4-6 hours, MAX 6 tablets)    You were discharged with an opioid medication (hydromorphone, " oxycodone, hydrocodone, or tramadol). This medication should only be taken for breakthrough pain that is not controlled with acetaminophen (TYLENOL). If you rate your pain less than 3 you do not need this medication.  Pain rating 0-3:  You do not need this medication.  Pain rating 4-6:  Take 1 tablet every 4-6 hours as needed  Pain rating 7-10:  Take 2 tablets every 4-6 hours as needed.  Do not exceed 6 tablets per day     Activity - Total Knee Arthroplasty     Return to Driving    Return to driving - Driving is NOT permitted until directed by your provider. Under no circumstance are you permitted to drive while using narcotic pain medications.     Dressing / Wound Care - NO Tub Bathing    Tub bathing, swimming, or any other activities that will cause your incision to be submerged in water should be avoided until allowed by your Surgeon.     Precautions    Keep knee in hinged knee brace locked in extension, no range of motion of knee     Toe touch weight bearing    Toe touch weight bearing.     Hinged Knee Brace    Remove for twice daily for skin assessment and hygiene.  Brace settings: locked in extension  Wear hinged knee brace at all times except for skin assessment and hygiene. When removing, ensure that you are safely seated or lying in bed     Dressing / Wound Care - Wound    You have a clean dressing on your surgical wound. Dressing change instructions as follows: dressing will be removed at your follow-up appointment. Contact your Surgeon Team if you have increased redness, warmth around the surgical wound, and/or drainage from the surgical wound.     Dressing / Wound care - Shower with wound/dressing covered    You must COVER your dressing or incision with saran wrap (or any other non-permeable covering) to allow the incision to remain dry while showering.  You may shower 2 days after surgery as long as the surgical wound stays dry. Continue to cover your dressing or incision for showering until your first  office visit.  You are strictly prohibited from soaking   or submerging the surgical wound underwater.     Patient care order    Resume prior to admission once daily dosing after completion of post-operative twice daily dosing for 6 weeks.     Crutches DME    DME Documentation: Describe the reason for need to support medical necessity: Impaired gait status post knee surgery. I, the undersigned, certify that the above prescribed supplies are medically necessary for this patient and is both reasonable and necessary in reference to accepted standards of medical practice in the treatment of this patient's condition and is not prescribed as a convenience.     Walker DME    DME Documentation: Describe the reason for need to support medical necessity: Impaired gait status post knee surgery. I, the undersigned, certify that the above prescribed supplies are medically necessary for this patient and is both reasonable and necessary in reference to accepted standards of medical practice in the treatment of this patient's condition and is not prescribed as a convenience.     Walker Order for DME - ONLY FOR DME    I, the undersigned, certify that the above prescribed supplies are medically necessary for this patient and is both reasonable and necessary in reference to accepted standards of medical and necessary in reference to accepted standards of medical practice in the treatment of this patient's condition and is not prescribed as a convenience.      Crutches Order for DME - ONLY FOR DME    I, the undersigned, certify that the above prescribed supplies are medically necessary for this patient and is both reasonable and necessary in reference to accepted standards of medical and necessary in reference to accepted standards of medical practice in the treatment of this patient's condition and is not prescribed as a convenience.      Discharge Instruction - Regular Diet Adult    Return to your pre-surgery diet unless instructed  otherwise     Discharge Medications   Discharge Medication List as of 4/21/2023 12:24 PM      START taking these medications    Details   acetaminophen (TYLENOL) 325 MG tablet Take 2 tablets (650 mg) by mouth every 4 hours as needed for other (mild pain), Disp-100 tablet, R-0, E-Prescribe      aspirin 81 MG EC tablet Take 1 tablet (81 mg) by mouth 2 times daily, Disp-60 tablet, R-0, E-Prescribe      cefTRIAXone (ROCEPHIN) 2 GM vial Inject 2 g into the vein every 24 hours for 40 days, Disp-800 mL, R-0, TransitionalCheck weekly CBC/diff , creatinine, AST. CRP. [facility]      oxyCODONE (ROXICODONE) 5 MG tablet Take 1-2 tablets (5-10 mg) by mouth every 4 hours as needed for moderate to severe pain, Disp-20 tablet, R-0, E-Prescribe      senna-docusate (SENOKOT-S/PERICOLACE) 8.6-50 MG tablet Take 1-2 tablets by mouth 2 times daily Take while on oral narcotics to prevent or treat constipation., Disp-30 tablet, R-0, E-PrescribeWhile taking narcotics      vancomycin 1,000 mg Inject 1,000 mg into the vein every 12 hours for 38 days, TransitionalCheck weekly CBC diff, and twice weekly Creatinine and Vancomycin level. Dosing per Pharm D based on AUC  [facility]         CONTINUE these medications which have NOT CHANGED    Details   cinnamon 500 MG CAPS Take 500 mg by mouth daily, Historical      ezetimibe (ZETIA) 10 MG tablet Take 1 tablet (10 mg) by mouth every evening, Disp-90 tablet, R-2, E-Prescribe      metoprolol succinate ER (TOPROL XL) 25 MG 24 hr tablet Take 1 tablet (25 mg) by mouth daily, Disp-90 tablet, R-1, E-Prescribe      nitroGLYcerin (NITROSTAT) 0.4 MG sublingual tablet Place 1 tablet (0.4 mg) under the tongue every 5 minutes as needed for chest pain, Disp-25 tablet, R-11, E-Prescribe      rosuvastatin (CRESTOR) 40 MG tablet Take 1 tablet (40 mg) by mouth daily, Disp-90 tablet, R-2, E-Prescribe      Cholecalciferol (VITAMIN D) 2000 UNITS CAPS Take 2,000  Units by mouth daily, Historical      lisinopril (ZESTRIL) 20 MG tablet Take 1 tablet (20 mg) by mouth daily, Disp-90 tablet, R-2, E-Prescribe      prasugrel (EFFIENT) 10 MG TABS tablet Take 1 tablet (10 mg) by mouth daily, Disp-90 tablet, R-2, E-Prescribe         STOP taking these medications       aspirin 81 MG tablet Comments:   Reason for Stopping:         vancomycin (VANCOCIN) 1250 mg/250 mL IVPB Comments:   Reason for Stopping:         vancomycin 750 mg Comments:   Reason for Stopping:             Allergies   No Known Allergies  Data   Most Recent 3 CBC's:Recent Labs   Lab Test 05/01/23  0815 04/25/23  1010 04/19/23  0618 04/18/23  0806   WBC 10.0 9.2  --  13.1*   HGB 11.8* 11.2* 10.4* 11.4*   MCV 99 98  --  97    370  --  328      Most Recent 3 BMP's:  Recent Labs   Lab Test 05/04/23  0830 05/01/23  0815 04/28/23  0815 04/20/23  0610 04/19/23  0618 04/18/23  0806 04/17/23  0957 04/17/23  0957 05/15/20  0930   NA  --   --   --   --  137 132*  --   --  137   POTASSIUM  --   --   --   --   --  4.6  --   --  4.2   CHLORIDE  --   --   --   --   --  98  --   --  105   CO2  --   --   --   --   --  22  --   --  26   BUN  --   --   --   --   --  21.3  --   --  27   CR 1.30* 1.32* 1.31*   < > 1.16 1.35*   < > 1.67* 1.46*   ANIONGAP  --   --   --   --   --  12  --   --  6   RYANNE  --   --   --   --   --  9.3  --   --  9.6   GLC  --   --   --   --  108* 200*  200*  --  103* 101*    < > = values in this interval not displayed.     Most Recent 2 LFT's:  Recent Labs   Lab Test 04/25/23  1010 05/29/19  0934   AST 47  --    ALT  --  16     Most Recent INR's and Anticoagulation Dosing History:  Anticoagulation Dose History         Latest Ref Rng & Units 12/19/2011 5/15/2020   Recent Dosing and Labs   INR 0.86 - 1.14 0.85   1.02                Most Recent 3 Troponin's:No lab results found.  Most Recent Cholesterol Panel:  Recent Labs   Lab Test 05/29/19  0934   CHOL 147   LDL 71   HDL 49   TRIG 136     Most Recent 6  Bacteria Isolates From Any Culture (See EPIC Reports for Culture Details):No lab results found.  Most Recent TSH, T4 and A1c Labs:No lab results found.

## 2023-05-08 ENCOUNTER — LAB REQUISITION (OUTPATIENT)
Dept: LAB | Facility: CLINIC | Age: 73
End: 2023-05-08
Payer: COMMERCIAL

## 2023-05-08 DIAGNOSIS — T84.53XA INFECTION AND INFLAMMATORY REACTION DUE TO INTERNAL RIGHT KNEE PROSTHESIS, INITIAL ENCOUNTER (H): ICD-10-CM

## 2023-05-08 LAB
BASOPHILS # BLD AUTO: 0.1 10E3/UL (ref 0–0.2)
BASOPHILS NFR BLD AUTO: 1 %
CREAT SERPL-MCNC: 1.47 MG/DL (ref 0.67–1.17)
EOSINOPHIL # BLD AUTO: 0.3 10E3/UL (ref 0–0.7)
EOSINOPHIL NFR BLD AUTO: 4 %
ERYTHROCYTE [DISTWIDTH] IN BLOOD BY AUTOMATED COUNT: 13.2 % (ref 10–15)
GFR SERPL CREATININE-BSD FRML MDRD: 51 ML/MIN/1.73M2
HCT VFR BLD AUTO: 38.5 % (ref 40–53)
HGB BLD-MCNC: 12.3 G/DL (ref 13.3–17.7)
IMM GRANULOCYTES # BLD: 0 10E3/UL
IMM GRANULOCYTES NFR BLD: 0 %
LYMPHOCYTES # BLD AUTO: 1.3 10E3/UL (ref 0.8–5.3)
LYMPHOCYTES NFR BLD AUTO: 18 %
MCH RBC QN AUTO: 32 PG (ref 26.5–33)
MCHC RBC AUTO-ENTMCNC: 31.9 G/DL (ref 31.5–36.5)
MCV RBC AUTO: 100 FL (ref 78–100)
MONOCYTES # BLD AUTO: 0.5 10E3/UL (ref 0–1.3)
MONOCYTES NFR BLD AUTO: 7 %
NEUTROPHILS # BLD AUTO: 5.1 10E3/UL (ref 1.6–8.3)
NEUTROPHILS NFR BLD AUTO: 70 %
NRBC # BLD AUTO: 0 10E3/UL
NRBC BLD AUTO-RTO: 0 /100
PLATELET # BLD AUTO: 293 10E3/UL (ref 150–450)
RBC # BLD AUTO: 3.84 10E6/UL (ref 4.4–5.9)
VANCOMYCIN SERPL-MCNC: 17 UG/ML
WBC # BLD AUTO: 7.3 10E3/UL (ref 4–11)

## 2023-05-08 PROCEDURE — 80202 ASSAY OF VANCOMYCIN: CPT | Performed by: SPECIALIST

## 2023-05-08 PROCEDURE — 82565 ASSAY OF CREATININE: CPT | Performed by: SPECIALIST

## 2023-05-08 PROCEDURE — 85025 COMPLETE CBC W/AUTO DIFF WBC: CPT | Performed by: SPECIALIST

## 2023-05-09 RX ORDER — LISINOPRIL 10 MG/1
10 TABLET ORAL DAILY
Qty: 90 TABLET | Refills: 3 | Status: SHIPPED | OUTPATIENT
Start: 2023-05-09 | End: 2023-07-19

## 2023-05-09 NOTE — TELEPHONE ENCOUNTER
August Hartmann MD Graf, Tyler Joseph, RN 8 hours ago (7:41 AM)       Would cut lisinopril in half to 10 mg daily.           Star[employer] message returned to patient  Iliana Hoyos RN on 5/9/2023 at 4:14 PM

## 2023-05-11 ENCOUNTER — LAB REQUISITION (OUTPATIENT)
Dept: LAB | Facility: CLINIC | Age: 73
End: 2023-05-11
Payer: COMMERCIAL

## 2023-05-11 DIAGNOSIS — T84.53XA INFECTION AND INFLAMMATORY REACTION DUE TO INTERNAL RIGHT KNEE PROSTHESIS, INITIAL ENCOUNTER (H): ICD-10-CM

## 2023-05-11 LAB
CREAT SERPL-MCNC: 1.27 MG/DL (ref 0.67–1.17)
GFR SERPL CREATININE-BSD FRML MDRD: 61 ML/MIN/1.73M2
VANCOMYCIN SERPL-MCNC: 15.2 UG/ML

## 2023-05-11 PROCEDURE — 82565 ASSAY OF CREATININE: CPT | Performed by: SPECIALIST

## 2023-05-11 PROCEDURE — 80202 ASSAY OF VANCOMYCIN: CPT | Performed by: SPECIALIST

## 2023-05-15 ENCOUNTER — LAB REQUISITION (OUTPATIENT)
Dept: LAB | Facility: CLINIC | Age: 73
End: 2023-05-15
Payer: COMMERCIAL

## 2023-05-15 DIAGNOSIS — M86.9 OSTEOMYELITIS, UNSPECIFIED (H): ICD-10-CM

## 2023-05-15 LAB
BASOPHILS # BLD AUTO: 0.1 10E3/UL (ref 0–0.2)
BASOPHILS NFR BLD AUTO: 1 %
CREAT SERPL-MCNC: 1.28 MG/DL (ref 0.67–1.17)
EOSINOPHIL # BLD AUTO: 0.3 10E3/UL (ref 0–0.7)
EOSINOPHIL NFR BLD AUTO: 6 %
ERYTHROCYTE [DISTWIDTH] IN BLOOD BY AUTOMATED COUNT: 13.1 % (ref 10–15)
GFR SERPL CREATININE-BSD FRML MDRD: 60 ML/MIN/1.73M2
HCT VFR BLD AUTO: 39 % (ref 40–53)
HGB BLD-MCNC: 12.5 G/DL (ref 13.3–17.7)
IMM GRANULOCYTES # BLD: 0 10E3/UL
IMM GRANULOCYTES NFR BLD: 0 %
LYMPHOCYTES # BLD AUTO: 1.4 10E3/UL (ref 0.8–5.3)
LYMPHOCYTES NFR BLD AUTO: 22 %
MCH RBC QN AUTO: 32.1 PG (ref 26.5–33)
MCHC RBC AUTO-ENTMCNC: 32.1 G/DL (ref 31.5–36.5)
MCV RBC AUTO: 100 FL (ref 78–100)
MONOCYTES # BLD AUTO: 0.4 10E3/UL (ref 0–1.3)
MONOCYTES NFR BLD AUTO: 7 %
NEUTROPHILS # BLD AUTO: 4 10E3/UL (ref 1.6–8.3)
NEUTROPHILS NFR BLD AUTO: 64 %
NRBC # BLD AUTO: 0 10E3/UL
NRBC BLD AUTO-RTO: 0 /100
PLATELET # BLD AUTO: 240 10E3/UL (ref 150–450)
RBC # BLD AUTO: 3.89 10E6/UL (ref 4.4–5.9)
VANCOMYCIN SERPL-MCNC: 15.9 UG/ML
WBC # BLD AUTO: 6.2 10E3/UL (ref 4–11)

## 2023-05-15 PROCEDURE — 82565 ASSAY OF CREATININE: CPT | Performed by: SPECIALIST

## 2023-05-15 PROCEDURE — 85018 HEMOGLOBIN: CPT | Performed by: SPECIALIST

## 2023-05-15 PROCEDURE — 80202 ASSAY OF VANCOMYCIN: CPT | Performed by: SPECIALIST

## 2023-05-18 ENCOUNTER — LAB REQUISITION (OUTPATIENT)
Dept: LAB | Facility: CLINIC | Age: 73
End: 2023-05-18
Payer: COMMERCIAL

## 2023-05-18 DIAGNOSIS — T84.50XA INFECTION AND INFLAMMATORY REACTION DUE TO UNSPECIFIED INTERNAL JOINT PROSTHESIS, INITIAL ENCOUNTER (H): ICD-10-CM

## 2023-05-18 DIAGNOSIS — M86.9 OSTEOMYELITIS, UNSPECIFIED (H): ICD-10-CM

## 2023-05-18 LAB
CREAT SERPL-MCNC: 1.34 MG/DL (ref 0.67–1.17)
CRP SERPL-MCNC: 11.15 MG/L
GFR SERPL CREATININE-BSD FRML MDRD: 57 ML/MIN/1.73M2
VANCOMYCIN SERPL-MCNC: 15 UG/ML

## 2023-05-18 PROCEDURE — 82565 ASSAY OF CREATININE: CPT | Performed by: SPECIALIST

## 2023-05-18 PROCEDURE — 86140 C-REACTIVE PROTEIN: CPT | Performed by: SPECIALIST

## 2023-05-18 PROCEDURE — 80202 ASSAY OF VANCOMYCIN: CPT | Performed by: SPECIALIST

## 2023-05-22 ENCOUNTER — LAB REQUISITION (OUTPATIENT)
Dept: LAB | Facility: CLINIC | Age: 73
End: 2023-05-22
Payer: COMMERCIAL

## 2023-05-22 DIAGNOSIS — M86.9 OSTEOMYELITIS, UNSPECIFIED (H): ICD-10-CM

## 2023-05-22 LAB
BASOPHILS # BLD AUTO: 0.1 10E3/UL (ref 0–0.2)
BASOPHILS NFR BLD AUTO: 1 %
CREAT SERPL-MCNC: 1.6 MG/DL (ref 0.67–1.17)
CRP SERPL-MCNC: 8.24 MG/L
EOSINOPHIL # BLD AUTO: 0.3 10E3/UL (ref 0–0.7)
EOSINOPHIL NFR BLD AUTO: 4 %
ERYTHROCYTE [DISTWIDTH] IN BLOOD BY AUTOMATED COUNT: 13 % (ref 10–15)
GFR SERPL CREATININE-BSD FRML MDRD: 46 ML/MIN/1.73M2
HCT VFR BLD AUTO: 38.8 % (ref 40–53)
HGB BLD-MCNC: 12.7 G/DL (ref 13.3–17.7)
IMM GRANULOCYTES # BLD: 0 10E3/UL
IMM GRANULOCYTES NFR BLD: 0 %
LYMPHOCYTES # BLD AUTO: 1.2 10E3/UL (ref 0.8–5.3)
LYMPHOCYTES NFR BLD AUTO: 15 %
MCH RBC QN AUTO: 32.7 PG (ref 26.5–33)
MCHC RBC AUTO-ENTMCNC: 32.7 G/DL (ref 31.5–36.5)
MCV RBC AUTO: 100 FL (ref 78–100)
MONOCYTES # BLD AUTO: 0.6 10E3/UL (ref 0–1.3)
MONOCYTES NFR BLD AUTO: 8 %
NEUTROPHILS # BLD AUTO: 5.7 10E3/UL (ref 1.6–8.3)
NEUTROPHILS NFR BLD AUTO: 72 %
NRBC # BLD AUTO: 0 10E3/UL
NRBC BLD AUTO-RTO: 0 /100
PLATELET # BLD AUTO: 239 10E3/UL (ref 150–450)
RBC # BLD AUTO: 3.88 10E6/UL (ref 4.4–5.9)
VANCOMYCIN SERPL-MCNC: 17.7 UG/ML
WBC # BLD AUTO: 8 10E3/UL (ref 4–11)

## 2023-05-22 PROCEDURE — 85004 AUTOMATED DIFF WBC COUNT: CPT | Performed by: SPECIALIST

## 2023-05-22 PROCEDURE — 80202 ASSAY OF VANCOMYCIN: CPT | Performed by: SPECIALIST

## 2023-05-22 PROCEDURE — 86140 C-REACTIVE PROTEIN: CPT | Performed by: SPECIALIST

## 2023-05-22 PROCEDURE — 82565 ASSAY OF CREATININE: CPT | Performed by: SPECIALIST

## 2023-05-25 ENCOUNTER — LAB REQUISITION (OUTPATIENT)
Dept: LAB | Facility: CLINIC | Age: 73
End: 2023-05-25
Payer: COMMERCIAL

## 2023-05-25 DIAGNOSIS — M86.9 OSTEOMYELITIS, UNSPECIFIED (H): ICD-10-CM

## 2023-05-25 LAB
CREAT SERPL-MCNC: 1.65 MG/DL (ref 0.67–1.17)
GFR SERPL CREATININE-BSD FRML MDRD: 44 ML/MIN/1.73M2
VANCOMYCIN SERPL-MCNC: 11.7 UG/ML

## 2023-05-25 PROCEDURE — 82565 ASSAY OF CREATININE: CPT | Performed by: SPECIALIST

## 2023-05-25 PROCEDURE — 80202 ASSAY OF VANCOMYCIN: CPT | Performed by: SPECIALIST

## 2023-05-29 ENCOUNTER — LAB REQUISITION (OUTPATIENT)
Dept: LAB | Facility: CLINIC | Age: 73
End: 2023-05-29
Payer: COMMERCIAL

## 2023-05-29 DIAGNOSIS — M86.9 OSTEOMYELITIS, UNSPECIFIED (H): ICD-10-CM

## 2023-05-29 LAB
BASOPHILS # BLD AUTO: 0.1 10E3/UL (ref 0–0.2)
BASOPHILS NFR BLD AUTO: 1 %
CREAT SERPL-MCNC: 1.61 MG/DL (ref 0.67–1.17)
CRP SERPL-MCNC: 14.75 MG/L
EOSINOPHIL # BLD AUTO: 0.2 10E3/UL (ref 0–0.7)
EOSINOPHIL NFR BLD AUTO: 3 %
ERYTHROCYTE [DISTWIDTH] IN BLOOD BY AUTOMATED COUNT: 12.5 % (ref 10–15)
GFR SERPL CREATININE-BSD FRML MDRD: 46 ML/MIN/1.73M2
HCT VFR BLD AUTO: 40.5 % (ref 40–53)
HGB BLD-MCNC: 12.9 G/DL (ref 13.3–17.7)
IMM GRANULOCYTES # BLD: 0 10E3/UL
IMM GRANULOCYTES NFR BLD: 0 %
LYMPHOCYTES # BLD AUTO: 1.3 10E3/UL (ref 0.8–5.3)
LYMPHOCYTES NFR BLD AUTO: 20 %
MCH RBC QN AUTO: 31.5 PG (ref 26.5–33)
MCHC RBC AUTO-ENTMCNC: 31.9 G/DL (ref 31.5–36.5)
MCV RBC AUTO: 99 FL (ref 78–100)
MONOCYTES # BLD AUTO: 0.5 10E3/UL (ref 0–1.3)
MONOCYTES NFR BLD AUTO: 8 %
NEUTROPHILS # BLD AUTO: 4.6 10E3/UL (ref 1.6–8.3)
NEUTROPHILS NFR BLD AUTO: 68 %
NRBC # BLD AUTO: 0 10E3/UL
NRBC BLD AUTO-RTO: 0 /100
PLATELET # BLD AUTO: 272 10E3/UL (ref 150–450)
RBC # BLD AUTO: 4.1 10E6/UL (ref 4.4–5.9)
VANCOMYCIN SERPL-MCNC: 8.8 UG/ML
WBC # BLD AUTO: 6.7 10E3/UL (ref 4–11)

## 2023-05-29 PROCEDURE — 80202 ASSAY OF VANCOMYCIN: CPT | Performed by: SPECIALIST

## 2023-05-29 PROCEDURE — 86140 C-REACTIVE PROTEIN: CPT | Performed by: SPECIALIST

## 2023-05-29 PROCEDURE — 85014 HEMATOCRIT: CPT | Performed by: SPECIALIST

## 2023-05-29 PROCEDURE — 82565 ASSAY OF CREATININE: CPT | Performed by: SPECIALIST

## 2023-05-31 ENCOUNTER — MEDICAL CORRESPONDENCE (OUTPATIENT)
Dept: HEALTH INFORMATION MANAGEMENT | Facility: CLINIC | Age: 73
End: 2023-05-31

## 2023-06-14 ENCOUNTER — TRANSFERRED RECORDS (OUTPATIENT)
Dept: HEALTH INFORMATION MANAGEMENT | Facility: CLINIC | Age: 73
End: 2023-06-14

## 2023-06-14 ENCOUNTER — LAB REQUISITION (OUTPATIENT)
Dept: LAB | Facility: CLINIC | Age: 73
End: 2023-06-14
Payer: COMMERCIAL

## 2023-06-14 DIAGNOSIS — Z47.89 ENCOUNTER FOR OTHER ORTHOPEDIC AFTERCARE: ICD-10-CM

## 2023-06-14 LAB
APPEARANCE FLD: ABNORMAL
BASOPHILS NFR FLD MANUAL: 1 %
CELL COUNT BODY FLUID SOURCE: ABNORMAL
COLOR FLD: YELLOW
CRYSTALS SNV MICRO: NORMAL
LYMPHOCYTES NFR FLD MANUAL: 13 %
MONOS+MACROS NFR FLD MANUAL: 15 %
NEUTS BAND NFR FLD MANUAL: 71 %
WBC # FLD AUTO: 411 /UL

## 2023-06-14 PROCEDURE — 87070 CULTURE OTHR SPECIMN AEROBIC: CPT | Mod: ORL | Performed by: ORTHOPAEDIC SURGERY

## 2023-06-14 PROCEDURE — 87075 CULTR BACTERIA EXCEPT BLOOD: CPT | Mod: ORL | Performed by: ORTHOPAEDIC SURGERY

## 2023-06-14 PROCEDURE — 89060 EXAM SYNOVIAL FLUID CRYSTALS: CPT | Mod: ORL | Performed by: ORTHOPAEDIC SURGERY

## 2023-06-14 PROCEDURE — 89051 BODY FLUID CELL COUNT: CPT | Mod: ORL | Performed by: ORTHOPAEDIC SURGERY

## 2023-06-14 PROCEDURE — 87205 SMEAR GRAM STAIN: CPT | Mod: ORL | Performed by: ORTHOPAEDIC SURGERY

## 2023-06-15 LAB
GRAM STAIN RESULT: NORMAL
GRAM STAIN RESULT: NORMAL

## 2023-06-20 ENCOUNTER — TELEPHONE (OUTPATIENT)
Dept: CARDIOLOGY | Facility: CLINIC | Age: 73
End: 2023-06-20
Payer: COMMERCIAL

## 2023-06-20 LAB — BACTERIA SNV CULT: NO GROWTH

## 2023-06-20 NOTE — TELEPHONE ENCOUNTER
Patient having knee replacement 7/20/23.  Patient has orders for stress echo due in September, patient states he will not be able to walk on treadmill, provider to advise on stress test recommendations.  Provider to also advise on hold instructions for ASA and Prasugrel.    Iliana Hoyos RN on 6/20/2023 at 2:54 PM

## 2023-06-21 NOTE — TELEPHONE ENCOUNTER
RN called patient and left detailed VM with Dr. Thomas recommendations below. RN advised patient to call back with any further questions/concerns.             Hold prasugrel.  Take ASA if ok with orthopedics.     Dr. Hartmann

## 2023-06-29 LAB — BACTERIA SNV CULT: NORMAL

## 2023-07-19 ENCOUNTER — ANESTHESIA EVENT (OUTPATIENT)
Dept: SURGERY | Facility: CLINIC | Age: 73
End: 2023-07-19
Payer: COMMERCIAL

## 2023-07-19 RX ORDER — LISINOPRIL 20 MG/1
0.5 TABLET ORAL DAILY
COMMUNITY
End: 2023-10-02

## 2023-07-19 RX ORDER — ASPIRIN 81 MG/1
1 TABLET ORAL DAILY
Status: ON HOLD | COMMUNITY
End: 2023-07-22

## 2023-07-19 NOTE — PROGRESS NOTES
PTA medications updated by Medication Scribe prior to surgery via phone call with patient (last doses completed by Nurse)     Medication history sources: Patient, Surescripts, H&P and Patient's home med list  In the past week, patient estimated taking medication this percent of the time: Greater than 90%      Significant changes made to the medication list:  Patient reports no longer taking the following meds (med scribe removed from PTA med list): Oxycodone, Senna-Docusate, Acetaminophen      Additional medication history information:   None    Medication reconciliation completed by provider prior to medication history? No    Time spent in this activity: 30 minutes    The information provided in this note is only as accurate as the sources available at the time of update(s)    Prior to Admission medications    Medication Sig Last Dose Taking? Auth Provider Long Term End Date   aspirin 81 MG EC tablet Take 1 tablet by mouth daily 7/12/2023 at am Yes Reported, Patient     B Complex Vitamins (B COMPLEX PO) Take 1 tablet by mouth daily 7/12/2023 at am Yes Reported, Patient     Cholecalciferol (VITAMIN D) 2000 UNITS CAPS Take 1 capsule by mouth daily 7/12/2023 at am Yes Reported, Patient     cinnamon 500 MG CAPS Take 1 capsule by mouth daily 7/12/2023 at am Yes Reported, Patient     diphenhydrAMINE-acetaminophen (TYLENOL PM)  MG tablet Take 1 tablet by mouth At Bedtime 7/19/2023 at pm Yes Reported, Patient     ezetimibe (ZETIA) 10 MG tablet Take 1 tablet (10 mg) by mouth every evening 7/19/2023 at pm Yes August Hartmann MD Yes    lisinopril (ZESTRIL) 20 MG tablet Take 0.5 tablets by mouth daily (0.5 x 20 mg = 10 mg) 7/19/2023 at am Yes Reported, Patient No    metoprolol succinate ER (TOPROL XL) 25 MG 24 hr tablet Take 1 tablet (25 mg) by mouth daily  at am Yes August Hartmann MD Yes    nitroGLYcerin (NITROSTAT) 0.4 MG sublingual tablet Place 1 tablet (0.4 mg) under the tongue every 5 minutes as  needed for chest pain Unknown at prn Yes August Hartmann MD Yes    prasugrel (EFFIENT) 10 MG TABS tablet Take 1 tablet (10 mg) by mouth daily 7/12/2023 at am Yes August Hartmann MD Yes    rosuvastatin (CRESTOR) 40 MG tablet Take 1 tablet (40 mg) by mouth daily 7/19/2023 at pm Yes August Hartmann MD Yes      Medication history completed by:    Fercho Whitehead CPhT  Medication [facility]

## 2023-07-20 ENCOUNTER — HOSPITAL ENCOUNTER (INPATIENT)
Facility: CLINIC | Age: 73
LOS: 2 days | Discharge: HOME | End: 2023-07-22
Attending: ORTHOPAEDIC SURGERY | Admitting: ORTHOPAEDIC SURGERY
Payer: COMMERCIAL

## 2023-07-20 ENCOUNTER — APPOINTMENT (OUTPATIENT)
Dept: GENERAL RADIOLOGY | Facility: CLINIC | Age: 73
End: 2023-07-20
Attending: SPECIALIST/TECHNOLOGIST
Payer: COMMERCIAL

## 2023-07-20 ENCOUNTER — ANESTHESIA (OUTPATIENT)
Dept: SURGERY | Facility: CLINIC | Age: 73
End: 2023-07-20
Payer: COMMERCIAL

## 2023-07-20 DIAGNOSIS — Z96.651 S/P REVISION OF TOTAL KNEE, RIGHT: Primary | ICD-10-CM

## 2023-07-20 LAB
% LINING CELLS, BODY FLUID: 10 %
ABO/RH(D): NORMAL
ANTIBODY SCREEN: NEGATIVE
APPEARANCE FLD: CLEAR
CELL COUNT BODY FLUID SOURCE: NORMAL
COLOR FLD: YELLOW
CREAT SERPL-MCNC: 1.67 MG/DL (ref 0.67–1.17)
CRYSTALS SNV MICRO: NORMAL
GFR SERPL CREATININE-BSD FRML MDRD: 44 ML/MIN/1.73M2
GLUCOSE SERPL-MCNC: 104 MG/DL (ref 70–99)
GRAM STAIN RESULT: NORMAL
LYMPHOCYTES NFR FLD MANUAL: 50 %
MONOS+MACROS NFR FLD MANUAL: 8 %
NEUTS BAND NFR FLD MANUAL: 32 %
POTASSIUM SERPL-SCNC: 4.2 MMOL/L (ref 3.4–5.3)
SODIUM SERPL-SCNC: 138 MMOL/L (ref 136–145)
SPECIMEN EXPIRATION DATE: NORMAL
WBC # FLD AUTO: 315 /UL

## 2023-07-20 PROCEDURE — 82947 ASSAY GLUCOSE BLOOD QUANT: CPT | Performed by: ANESTHESIOLOGY

## 2023-07-20 PROCEDURE — 360N000078 HC SURGERY LEVEL 5, PER MIN: Performed by: ORTHOPAEDIC SURGERY

## 2023-07-20 PROCEDURE — 710N000009 HC RECOVERY PHASE 1, LEVEL 1, PER MIN: Performed by: ORTHOPAEDIC SURGERY

## 2023-07-20 PROCEDURE — 258N000003 HC RX IP 258 OP 636: Performed by: NURSE ANESTHETIST, CERTIFIED REGISTERED

## 2023-07-20 PROCEDURE — 250N000011 HC RX IP 250 OP 636: Performed by: ANESTHESIOLOGY

## 2023-07-20 PROCEDURE — 999N000063 XR KNEE PORT RIGHT 1/2 VIEWS: Mod: RT

## 2023-07-20 PROCEDURE — 250N000011 HC RX IP 250 OP 636: Performed by: SPECIALIST/TECHNOLOGIST

## 2023-07-20 PROCEDURE — 86901 BLOOD TYPING SEROLOGIC RH(D): CPT | Performed by: ANESTHESIOLOGY

## 2023-07-20 PROCEDURE — 258N000001 HC RX 258: Performed by: ORTHOPAEDIC SURGERY

## 2023-07-20 PROCEDURE — 84132 ASSAY OF SERUM POTASSIUM: CPT | Performed by: ANESTHESIOLOGY

## 2023-07-20 PROCEDURE — 120N000001 HC R&B MED SURG/OB

## 2023-07-20 PROCEDURE — 250N000009 HC RX 250: Performed by: ORTHOPAEDIC SURGERY

## 2023-07-20 PROCEDURE — 89060 EXAM SYNOVIAL FLUID CRYSTALS: CPT | Performed by: ORTHOPAEDIC SURGERY

## 2023-07-20 PROCEDURE — 250N000011 HC RX IP 250 OP 636: Mod: JZ | Performed by: SPECIALIST/TECHNOLOGIST

## 2023-07-20 PROCEDURE — 250N000011 HC RX IP 250 OP 636: Performed by: ORTHOPAEDIC SURGERY

## 2023-07-20 PROCEDURE — 258N000003 HC RX IP 258 OP 636: Performed by: SPECIALIST/TECHNOLOGIST

## 2023-07-20 PROCEDURE — 86850 RBC ANTIBODY SCREEN: CPT | Performed by: ANESTHESIOLOGY

## 2023-07-20 PROCEDURE — C1776 JOINT DEVICE (IMPLANTABLE): HCPCS | Performed by: ORTHOPAEDIC SURGERY

## 2023-07-20 PROCEDURE — 250N000013 HC RX MED GY IP 250 OP 250 PS 637: Performed by: SPECIALIST/TECHNOLOGIST

## 2023-07-20 PROCEDURE — 250N000011 HC RX IP 250 OP 636: Performed by: NURSE ANESTHETIST, CERTIFIED REGISTERED

## 2023-07-20 PROCEDURE — 272N000001 HC OR GENERAL SUPPLY STERILE: Performed by: ORTHOPAEDIC SURGERY

## 2023-07-20 PROCEDURE — 250N000009 HC RX 250: Performed by: ANESTHESIOLOGY

## 2023-07-20 PROCEDURE — 250N000009 HC RX 250: Performed by: NURSE ANESTHETIST, CERTIFIED REGISTERED

## 2023-07-20 PROCEDURE — 99252 IP/OBS CONSLTJ NEW/EST SF 35: CPT | Performed by: NURSE PRACTITIONER

## 2023-07-20 PROCEDURE — 250N000011 HC RX IP 250 OP 636: Mod: JZ | Performed by: NURSE ANESTHETIST, CERTIFIED REGISTERED

## 2023-07-20 PROCEDURE — 89051 BODY FLUID CELL COUNT: CPT | Performed by: ORTHOPAEDIC SURGERY

## 2023-07-20 PROCEDURE — 0SPC0EZ REMOVAL OF ARTICULATING SPACER FROM RIGHT KNEE JOINT, OPEN APPROACH: ICD-10-PCS | Performed by: ORTHOPAEDIC SURGERY

## 2023-07-20 PROCEDURE — 82565 ASSAY OF CREATININE: CPT | Performed by: ORTHOPAEDIC SURGERY

## 2023-07-20 PROCEDURE — 370N000017 HC ANESTHESIA TECHNICAL FEE, PER MIN: Performed by: ORTHOPAEDIC SURGERY

## 2023-07-20 PROCEDURE — 87205 SMEAR GRAM STAIN: CPT | Performed by: ORTHOPAEDIC SURGERY

## 2023-07-20 PROCEDURE — 87075 CULTR BACTERIA EXCEPT BLOOD: CPT | Performed by: ORTHOPAEDIC SURGERY

## 2023-07-20 PROCEDURE — C1713 ANCHOR/SCREW BN/BN,TIS/BN: HCPCS | Performed by: ORTHOPAEDIC SURGERY

## 2023-07-20 PROCEDURE — 36415 COLL VENOUS BLD VENIPUNCTURE: CPT | Performed by: ANESTHESIOLOGY

## 2023-07-20 PROCEDURE — 84295 ASSAY OF SERUM SODIUM: CPT | Performed by: NURSE PRACTITIONER

## 2023-07-20 PROCEDURE — 250N000025 HC SEVOFLURANE, PER MIN: Performed by: ORTHOPAEDIC SURGERY

## 2023-07-20 PROCEDURE — 87070 CULTURE OTHR SPECIMN AEROBIC: CPT | Performed by: ORTHOPAEDIC SURGERY

## 2023-07-20 PROCEDURE — 999N000141 HC STATISTIC PRE-PROCEDURE NURSING ASSESSMENT: Performed by: ORTHOPAEDIC SURGERY

## 2023-07-20 PROCEDURE — 0SRC0JZ REPLACEMENT OF RIGHT KNEE JOINT WITH SYNTHETIC SUBSTITUTE, OPEN APPROACH: ICD-10-PCS | Performed by: ORTHOPAEDIC SURGERY

## 2023-07-20 PROCEDURE — 258N000003 HC RX IP 258 OP 636: Performed by: ANESTHESIOLOGY

## 2023-07-20 DEVICE — TIBIAL BEARING INSERT - PS
Type: IMPLANTABLE DEVICE | Site: KNEE | Status: FUNCTIONAL
Brand: TRIATHLON

## 2023-07-20 DEVICE — TOBRA FULL DOSE ANTIBIOTIC BONE CEMENT, 10 PACK CATALOG NUMBER IS 6197-9-010
Type: IMPLANTABLE DEVICE | Site: KNEE | Status: FUNCTIONAL
Brand: SIMPLEX

## 2023-07-20 DEVICE — UNIVERSAL TIBIAL BASEPLATE
Type: IMPLANTABLE DEVICE | Site: KNEE | Status: FUNCTIONAL
Brand: TRIATHLON

## 2023-07-20 DEVICE — CEMENTED STEM
Type: IMPLANTABLE DEVICE | Site: KNEE | Status: FUNCTIONAL
Brand: TRIATHLON

## 2023-07-20 DEVICE — IMPLANTABLE DEVICE: Type: IMPLANTABLE DEVICE | Site: KNEE | Status: FUNCTIONAL

## 2023-07-20 DEVICE — BUCK FEMORAL CEMENT RESTRICTOR 25MM
Type: IMPLANTABLE DEVICE | Site: KNEE | Status: FUNCTIONAL
Brand: BUCK

## 2023-07-20 DEVICE — FEMORAL POSTERIOR AUGMENT
Type: IMPLANTABLE DEVICE | Site: KNEE | Status: FUNCTIONAL
Brand: TRIATHLON

## 2023-07-20 RX ORDER — NEOSTIGMINE METHYLSULFATE 1 MG/ML
VIAL (ML) INJECTION PRN
Status: DISCONTINUED | OUTPATIENT
Start: 2023-07-20 | End: 2023-07-20

## 2023-07-20 RX ORDER — DEXMEDETOMIDINE HYDROCHLORIDE 4 UG/ML
INJECTION, SOLUTION INTRAVENOUS PRN
Status: DISCONTINUED | OUTPATIENT
Start: 2023-07-20 | End: 2023-07-20

## 2023-07-20 RX ORDER — ONDANSETRON 4 MG/1
4 TABLET, ORALLY DISINTEGRATING ORAL EVERY 30 MIN PRN
Status: CANCELLED | OUTPATIENT
Start: 2023-07-20

## 2023-07-20 RX ORDER — ONDANSETRON 4 MG/1
4 TABLET, ORALLY DISINTEGRATING ORAL EVERY 30 MIN PRN
Status: DISCONTINUED | OUTPATIENT
Start: 2023-07-20 | End: 2023-07-20 | Stop reason: HOSPADM

## 2023-07-20 RX ORDER — PROPOFOL 10 MG/ML
INJECTION, EMULSION INTRAVENOUS PRN
Status: DISCONTINUED | OUTPATIENT
Start: 2023-07-20 | End: 2023-07-20

## 2023-07-20 RX ORDER — METOPROLOL SUCCINATE 25 MG/1
25 TABLET, EXTENDED RELEASE ORAL DAILY
Status: DISCONTINUED | OUTPATIENT
Start: 2023-07-21 | End: 2023-07-22 | Stop reason: HOSPADM

## 2023-07-20 RX ORDER — ASPIRIN 81 MG/1
81 TABLET ORAL 2 TIMES DAILY
Status: DISCONTINUED | OUTPATIENT
Start: 2023-07-20 | End: 2023-07-22 | Stop reason: HOSPADM

## 2023-07-20 RX ORDER — DEXAMETHASONE SODIUM PHOSPHATE 4 MG/ML
INJECTION, SOLUTION INTRA-ARTICULAR; INTRALESIONAL; INTRAMUSCULAR; INTRAVENOUS; SOFT TISSUE PRN
Status: DISCONTINUED | OUTPATIENT
Start: 2023-07-20 | End: 2023-07-20

## 2023-07-20 RX ORDER — FENTANYL CITRATE 0.05 MG/ML
50 INJECTION, SOLUTION INTRAMUSCULAR; INTRAVENOUS EVERY 5 MIN PRN
Status: DISCONTINUED | OUTPATIENT
Start: 2023-07-20 | End: 2023-07-20 | Stop reason: HOSPADM

## 2023-07-20 RX ORDER — ONDANSETRON 4 MG/1
4 TABLET, ORALLY DISINTEGRATING ORAL EVERY 6 HOURS PRN
Status: DISCONTINUED | OUTPATIENT
Start: 2023-07-20 | End: 2023-07-22 | Stop reason: HOSPADM

## 2023-07-20 RX ORDER — OXYCODONE HYDROCHLORIDE 5 MG/1
5 TABLET ORAL
Status: CANCELLED | OUTPATIENT
Start: 2023-07-20

## 2023-07-20 RX ORDER — FENTANYL CITRATE 0.05 MG/ML
25 INJECTION, SOLUTION INTRAMUSCULAR; INTRAVENOUS
Status: CANCELLED | OUTPATIENT
Start: 2023-07-20

## 2023-07-20 RX ORDER — OXYCODONE HYDROCHLORIDE 5 MG/1
10 TABLET ORAL
Status: CANCELLED | OUTPATIENT
Start: 2023-07-20

## 2023-07-20 RX ORDER — ACETAMINOPHEN 325 MG/1
975 TABLET ORAL ONCE
Status: COMPLETED | OUTPATIENT
Start: 2023-07-20 | End: 2023-07-20

## 2023-07-20 RX ORDER — NALOXONE HYDROCHLORIDE 0.4 MG/ML
0.4 INJECTION, SOLUTION INTRAMUSCULAR; INTRAVENOUS; SUBCUTANEOUS
Status: DISCONTINUED | OUTPATIENT
Start: 2023-07-20 | End: 2023-07-22 | Stop reason: HOSPADM

## 2023-07-20 RX ORDER — NALOXONE HYDROCHLORIDE 0.4 MG/ML
0.2 INJECTION, SOLUTION INTRAMUSCULAR; INTRAVENOUS; SUBCUTANEOUS
Status: DISCONTINUED | OUTPATIENT
Start: 2023-07-20 | End: 2023-07-22 | Stop reason: HOSPADM

## 2023-07-20 RX ORDER — ONDANSETRON 2 MG/ML
INJECTION INTRAMUSCULAR; INTRAVENOUS PRN
Status: DISCONTINUED | OUTPATIENT
Start: 2023-07-20 | End: 2023-07-20

## 2023-07-20 RX ORDER — LISINOPRIL 10 MG/1
10 TABLET ORAL DAILY
Status: DISCONTINUED | OUTPATIENT
Start: 2023-07-21 | End: 2023-07-22 | Stop reason: HOSPADM

## 2023-07-20 RX ORDER — LIDOCAINE 40 MG/G
CREAM TOPICAL
Status: DISCONTINUED | OUTPATIENT
Start: 2023-07-20 | End: 2023-07-22 | Stop reason: HOSPADM

## 2023-07-20 RX ORDER — FENTANYL CITRATE 50 UG/ML
INJECTION, SOLUTION INTRAMUSCULAR; INTRAVENOUS PRN
Status: DISCONTINUED | OUTPATIENT
Start: 2023-07-20 | End: 2023-07-20

## 2023-07-20 RX ORDER — LABETALOL HYDROCHLORIDE 5 MG/ML
10 INJECTION, SOLUTION INTRAVENOUS
Status: DISCONTINUED | OUTPATIENT
Start: 2023-07-20 | End: 2023-07-20 | Stop reason: HOSPADM

## 2023-07-20 RX ORDER — ONDANSETRON 2 MG/ML
4 INJECTION INTRAMUSCULAR; INTRAVENOUS EVERY 6 HOURS PRN
Status: DISCONTINUED | OUTPATIENT
Start: 2023-07-20 | End: 2023-07-22 | Stop reason: HOSPADM

## 2023-07-20 RX ORDER — PROCHLORPERAZINE MALEATE 5 MG
5 TABLET ORAL EVERY 6 HOURS PRN
Status: DISCONTINUED | OUTPATIENT
Start: 2023-07-20 | End: 2023-07-22 | Stop reason: HOSPADM

## 2023-07-20 RX ORDER — LIDOCAINE HYDROCHLORIDE 20 MG/ML
INJECTION, SOLUTION INFILTRATION; PERINEURAL PRN
Status: DISCONTINUED | OUTPATIENT
Start: 2023-07-20 | End: 2023-07-20

## 2023-07-20 RX ORDER — HYDROMORPHONE HCL IN WATER/PF 6 MG/30 ML
0.2 PATIENT CONTROLLED ANALGESIA SYRINGE INTRAVENOUS EVERY 5 MIN PRN
Status: DISCONTINUED | OUTPATIENT
Start: 2023-07-20 | End: 2023-07-20 | Stop reason: HOSPADM

## 2023-07-20 RX ORDER — CEFAZOLIN SODIUM/WATER 2 G/20 ML
2 SYRINGE (ML) INTRAVENOUS SEE ADMIN INSTRUCTIONS
Status: DISCONTINUED | OUTPATIENT
Start: 2023-07-20 | End: 2023-07-20 | Stop reason: HOSPADM

## 2023-07-20 RX ORDER — ALBUTEROL SULFATE 0.83 MG/ML
2.5 SOLUTION RESPIRATORY (INHALATION) EVERY 4 HOURS PRN
Status: DISCONTINUED | OUTPATIENT
Start: 2023-07-20 | End: 2023-07-20 | Stop reason: HOSPADM

## 2023-07-20 RX ORDER — TRANEXAMIC ACID 650 MG/1
1950 TABLET ORAL ONCE
Status: COMPLETED | OUTPATIENT
Start: 2023-07-20 | End: 2023-07-20

## 2023-07-20 RX ORDER — SODIUM CHLORIDE, SODIUM LACTATE, POTASSIUM CHLORIDE, CALCIUM CHLORIDE 600; 310; 30; 20 MG/100ML; MG/100ML; MG/100ML; MG/100ML
INJECTION, SOLUTION INTRAVENOUS CONTINUOUS
Status: DISCONTINUED | OUTPATIENT
Start: 2023-07-20 | End: 2023-07-20 | Stop reason: HOSPADM

## 2023-07-20 RX ORDER — POLYETHYLENE GLYCOL 3350 17 G/17G
17 POWDER, FOR SOLUTION ORAL DAILY
Status: DISCONTINUED | OUTPATIENT
Start: 2023-07-21 | End: 2023-07-22 | Stop reason: HOSPADM

## 2023-07-20 RX ORDER — AMOXICILLIN 250 MG
1 CAPSULE ORAL 2 TIMES DAILY
Status: DISCONTINUED | OUTPATIENT
Start: 2023-07-20 | End: 2023-07-22 | Stop reason: HOSPADM

## 2023-07-20 RX ORDER — PROPOFOL 10 MG/ML
INJECTION, EMULSION INTRAVENOUS CONTINUOUS PRN
Status: DISCONTINUED | OUTPATIENT
Start: 2023-07-20 | End: 2023-07-20

## 2023-07-20 RX ORDER — ONDANSETRON 2 MG/ML
4 INJECTION INTRAMUSCULAR; INTRAVENOUS EVERY 30 MIN PRN
Status: CANCELLED | OUTPATIENT
Start: 2023-07-20

## 2023-07-20 RX ORDER — HYDRALAZINE HYDROCHLORIDE 20 MG/ML
2.5-5 INJECTION INTRAMUSCULAR; INTRAVENOUS EVERY 10 MIN PRN
Status: DISCONTINUED | OUTPATIENT
Start: 2023-07-20 | End: 2023-07-20 | Stop reason: HOSPADM

## 2023-07-20 RX ORDER — HYDROMORPHONE HCL IN WATER/PF 6 MG/30 ML
0.2 PATIENT CONTROLLED ANALGESIA SYRINGE INTRAVENOUS
Status: DISCONTINUED | OUTPATIENT
Start: 2023-07-20 | End: 2023-07-22 | Stop reason: HOSPADM

## 2023-07-20 RX ORDER — FENTANYL CITRATE 0.05 MG/ML
25 INJECTION, SOLUTION INTRAMUSCULAR; INTRAVENOUS EVERY 5 MIN PRN
Status: DISCONTINUED | OUTPATIENT
Start: 2023-07-20 | End: 2023-07-20 | Stop reason: HOSPADM

## 2023-07-20 RX ORDER — SODIUM CHLORIDE, SODIUM LACTATE, POTASSIUM CHLORIDE, CALCIUM CHLORIDE 600; 310; 30; 20 MG/100ML; MG/100ML; MG/100ML; MG/100ML
INJECTION, SOLUTION INTRAVENOUS CONTINUOUS
Status: DISCONTINUED | OUTPATIENT
Start: 2023-07-20 | End: 2023-07-21

## 2023-07-20 RX ORDER — MEPERIDINE HYDROCHLORIDE 25 MG/ML
12.5 INJECTION INTRAMUSCULAR; INTRAVENOUS; SUBCUTANEOUS EVERY 5 MIN PRN
Status: DISCONTINUED | OUTPATIENT
Start: 2023-07-20 | End: 2023-07-20 | Stop reason: HOSPADM

## 2023-07-20 RX ORDER — OXYCODONE HYDROCHLORIDE 5 MG/1
5 TABLET ORAL EVERY 4 HOURS PRN
Status: DISCONTINUED | OUTPATIENT
Start: 2023-07-20 | End: 2023-07-22 | Stop reason: HOSPADM

## 2023-07-20 RX ORDER — MAGNESIUM HYDROXIDE 1200 MG/15ML
LIQUID ORAL PRN
Status: DISCONTINUED | OUTPATIENT
Start: 2023-07-20 | End: 2023-07-20 | Stop reason: HOSPADM

## 2023-07-20 RX ORDER — HYDROMORPHONE HCL IN WATER/PF 6 MG/30 ML
0.4 PATIENT CONTROLLED ANALGESIA SYRINGE INTRAVENOUS
Status: DISCONTINUED | OUTPATIENT
Start: 2023-07-20 | End: 2023-07-22 | Stop reason: HOSPADM

## 2023-07-20 RX ORDER — GLYCOPYRROLATE 0.2 MG/ML
INJECTION, SOLUTION INTRAMUSCULAR; INTRAVENOUS PRN
Status: DISCONTINUED | OUTPATIENT
Start: 2023-07-20 | End: 2023-07-20

## 2023-07-20 RX ORDER — ACETAMINOPHEN 325 MG/1
975 TABLET ORAL EVERY 8 HOURS
Status: DISCONTINUED | OUTPATIENT
Start: 2023-07-20 | End: 2023-07-22 | Stop reason: HOSPADM

## 2023-07-20 RX ORDER — VANCOMYCIN HYDROCHLORIDE 1 G/20ML
INJECTION, POWDER, LYOPHILIZED, FOR SOLUTION INTRAVENOUS PRN
Status: DISCONTINUED | OUTPATIENT
Start: 2023-07-20 | End: 2023-07-20 | Stop reason: HOSPADM

## 2023-07-20 RX ORDER — ONDANSETRON 2 MG/ML
4 INJECTION INTRAMUSCULAR; INTRAVENOUS EVERY 30 MIN PRN
Status: DISCONTINUED | OUTPATIENT
Start: 2023-07-20 | End: 2023-07-20 | Stop reason: HOSPADM

## 2023-07-20 RX ORDER — HYDROMORPHONE HCL IN WATER/PF 6 MG/30 ML
0.4 PATIENT CONTROLLED ANALGESIA SYRINGE INTRAVENOUS EVERY 5 MIN PRN
Status: DISCONTINUED | OUTPATIENT
Start: 2023-07-20 | End: 2023-07-20 | Stop reason: HOSPADM

## 2023-07-20 RX ORDER — BISACODYL 10 MG
10 SUPPOSITORY, RECTAL RECTAL DAILY PRN
Status: DISCONTINUED | OUTPATIENT
Start: 2023-07-20 | End: 2023-07-22 | Stop reason: HOSPADM

## 2023-07-20 RX ORDER — CEFAZOLIN SODIUM/WATER 2 G/20 ML
2 SYRINGE (ML) INTRAVENOUS
Status: COMPLETED | OUTPATIENT
Start: 2023-07-20 | End: 2023-07-20

## 2023-07-20 RX ORDER — ACETAMINOPHEN 325 MG/1
650 TABLET ORAL EVERY 4 HOURS PRN
Status: DISCONTINUED | OUTPATIENT
Start: 2023-07-23 | End: 2023-07-22 | Stop reason: HOSPADM

## 2023-07-20 RX ORDER — FAMOTIDINE 20 MG/1
20 TABLET, FILM COATED ORAL DAILY
Status: DISCONTINUED | OUTPATIENT
Start: 2023-07-20 | End: 2023-07-22 | Stop reason: HOSPADM

## 2023-07-20 RX ORDER — CEFAZOLIN SODIUM 2 G/100ML
2 INJECTION, SOLUTION INTRAVENOUS EVERY 8 HOURS
Status: DISCONTINUED | OUTPATIENT
Start: 2023-07-20 | End: 2023-07-22 | Stop reason: HOSPADM

## 2023-07-20 RX ORDER — OXYCODONE HYDROCHLORIDE 5 MG/1
10 TABLET ORAL EVERY 4 HOURS PRN
Status: DISCONTINUED | OUTPATIENT
Start: 2023-07-20 | End: 2023-07-22 | Stop reason: HOSPADM

## 2023-07-20 RX ADMIN — MIDAZOLAM 2 MG: 1 INJECTION INTRAMUSCULAR; INTRAVENOUS at 13:30

## 2023-07-20 RX ADMIN — MIDAZOLAM 1 MG: 1 INJECTION INTRAMUSCULAR; INTRAVENOUS at 12:05

## 2023-07-20 RX ADMIN — PHENYLEPHRINE HYDROCHLORIDE 200 MCG: 10 INJECTION INTRAVENOUS at 16:31

## 2023-07-20 RX ADMIN — ROCURONIUM BROMIDE 50 MG: 50 INJECTION, SOLUTION INTRAVENOUS at 13:40

## 2023-07-20 RX ADMIN — TRANEXAMIC ACID 1950 MG: 650 TABLET ORAL at 10:59

## 2023-07-20 RX ADMIN — ROCURONIUM BROMIDE 10 MG: 50 INJECTION, SOLUTION INTRAVENOUS at 14:58

## 2023-07-20 RX ADMIN — PROPOFOL 200 MG: 10 INJECTION, EMULSION INTRAVENOUS at 13:40

## 2023-07-20 RX ADMIN — SODIUM CHLORIDE, POTASSIUM CHLORIDE, SODIUM LACTATE AND CALCIUM CHLORIDE: 600; 310; 30; 20 INJECTION, SOLUTION INTRAVENOUS at 12:05

## 2023-07-20 RX ADMIN — ASPIRIN 81 MG: 81 TABLET, COATED ORAL at 20:13

## 2023-07-20 RX ADMIN — DEXMEDETOMIDINE HYDROCHLORIDE 12 MCG: 200 INJECTION INTRAVENOUS at 14:52

## 2023-07-20 RX ADMIN — GLYCOPYRROLATE 0.8 MG: 0.2 INJECTION, SOLUTION INTRAMUSCULAR; INTRAVENOUS at 17:00

## 2023-07-20 RX ADMIN — HYDROMORPHONE HYDROCHLORIDE 0.5 MG: 1 INJECTION, SOLUTION INTRAMUSCULAR; INTRAVENOUS; SUBCUTANEOUS at 14:52

## 2023-07-20 RX ADMIN — PHENYLEPHRINE HYDROCHLORIDE 50 MCG: 10 INJECTION INTRAVENOUS at 13:40

## 2023-07-20 RX ADMIN — NEOSTIGMINE METHYLSULFATE 5 MG: 1 INJECTION, SOLUTION INTRAVENOUS at 17:00

## 2023-07-20 RX ADMIN — ONDANSETRON 4 MG: 2 INJECTION INTRAMUSCULAR; INTRAVENOUS at 16:39

## 2023-07-20 RX ADMIN — PHENYLEPHRINE HYDROCHLORIDE 50 MCG: 10 INJECTION INTRAVENOUS at 15:02

## 2023-07-20 RX ADMIN — Medication 15 ML: at 12:05

## 2023-07-20 RX ADMIN — OXYCODONE HYDROCHLORIDE 5 MG: 5 TABLET ORAL at 20:13

## 2023-07-20 RX ADMIN — SODIUM CHLORIDE, POTASSIUM CHLORIDE, SODIUM LACTATE AND CALCIUM CHLORIDE: 600; 310; 30; 20 INJECTION, SOLUTION INTRAVENOUS at 20:14

## 2023-07-20 RX ADMIN — Medication 2 G: at 13:48

## 2023-07-20 RX ADMIN — PHENYLEPHRINE HYDROCHLORIDE 50 MCG: 10 INJECTION INTRAVENOUS at 13:59

## 2023-07-20 RX ADMIN — ACETAMINOPHEN 975 MG: 325 TABLET, FILM COATED ORAL at 10:59

## 2023-07-20 RX ADMIN — PHENYLEPHRINE HYDROCHLORIDE 0.3 MCG/KG/MIN: 10 INJECTION INTRAVENOUS at 14:03

## 2023-07-20 RX ADMIN — ACETAMINOPHEN 975 MG: 325 TABLET, FILM COATED ORAL at 20:13

## 2023-07-20 RX ADMIN — FENTANYL CITRATE 50 MCG: 50 INJECTION, SOLUTION INTRAMUSCULAR; INTRAVENOUS at 13:40

## 2023-07-20 RX ADMIN — PHENYLEPHRINE HYDROCHLORIDE 100 MCG: 10 INJECTION INTRAVENOUS at 13:57

## 2023-07-20 RX ADMIN — LIDOCAINE HYDROCHLORIDE 80 MG: 20 INJECTION, SOLUTION INFILTRATION; PERINEURAL at 13:40

## 2023-07-20 RX ADMIN — SENNOSIDES AND DOCUSATE SODIUM 1 TABLET: 50; 8.6 TABLET ORAL at 20:13

## 2023-07-20 RX ADMIN — ROCURONIUM BROMIDE 20 MG: 50 INJECTION, SOLUTION INTRAVENOUS at 14:19

## 2023-07-20 RX ADMIN — PROPOFOL 30 MCG/KG/MIN: 10 INJECTION, EMULSION INTRAVENOUS at 13:44

## 2023-07-20 RX ADMIN — CEFAZOLIN SODIUM 2 G: 2 INJECTION, SOLUTION INTRAVENOUS at 20:38

## 2023-07-20 RX ADMIN — FENTANYL CITRATE 50 MCG: 50 INJECTION, SOLUTION INTRAMUSCULAR; INTRAVENOUS at 14:09

## 2023-07-20 RX ADMIN — PHENYLEPHRINE HYDROCHLORIDE 200 MCG: 10 INJECTION INTRAVENOUS at 16:29

## 2023-07-20 RX ADMIN — ROCURONIUM BROMIDE 10 MG: 50 INJECTION, SOLUTION INTRAVENOUS at 15:58

## 2023-07-20 RX ADMIN — DEXAMETHASONE SODIUM PHOSPHATE 10 MG: 4 INJECTION, SOLUTION INTRA-ARTICULAR; INTRALESIONAL; INTRAMUSCULAR; INTRAVENOUS; SOFT TISSUE at 13:58

## 2023-07-20 RX ADMIN — FAMOTIDINE 20 MG: 20 TABLET ORAL at 21:30

## 2023-07-20 ASSESSMENT — ACTIVITIES OF DAILY LIVING (ADL)
ADLS_ACUITY_SCORE: 26
ADLS_ACUITY_SCORE: 37
ADLS_ACUITY_SCORE: 26
ADLS_ACUITY_SCORE: 26

## 2023-07-20 NOTE — ANESTHESIA PROCEDURE NOTES
Femoral and Adductor canal Procedure Note    Pre-Procedure   Staff -        Anesthesiologist:  Mague Recinos MD       Performed By: Anesthesiologist       Location: pre-op       Pre-Anesthestic Checklist: patient identified, IV checked, site marked, risks and benefits discussed, informed consent, monitors and equipment checked, pre-op evaluation, at physician/surgeon's request and post-op pain management  Timeout:       Correct Patient: Yes        Correct Procedure: Yes        Correct Site: Yes        Correct Position: Yes        Correct Laterality: Yes        Site Marked: Yes  Procedure Documentation  Procedure: Femoral, Adductor canal       Patient Position: supine       Patient Prep/Sterile Barriers: sterile gloves, mask       Skin prep: Chloraprep       Local skin infiltrated with 3 mL of 1% lidocaine.        Needle Type: insulated and short bevel       Needle Gauge: 20.        Needle Length (millimeters): 100        Ultrasound guided       1. Ultrasound was used to identify targeted nerve, plexus, vascular marker, or fascial plane and place a needle adjacent to it in real-time.       2. Ultrasound was used to visualize the spread of anesthetic in close proximity to the above referenced structure.       3. A permanent image is entered into the patient's record.    Assessment/Narrative         The placement was negative for: blood aspirated, painful injection and site bleeding       Paresthesias: No.       Test dose of mL at.         Test dose negative, 3 minutes after injection, for signs of intravascular, subdural, or intrathecal injection.       Bolus given via needle..        Secured via.        Insertion/Infusion Method: Single Shot       Complications: none       Injection made incrementally with aspirations every 5 mL.    Medication(s) Administered   Bupivacaine 0.5% w/ 1:400K Epi (Injection) - Injection   15 mL - 7/20/2023 12:05:00 PM   Comments:  Ultrasound Interpretation, Peripheral Nerve  "Block    1. Under ultrasound guidance, the needle was inserted and placed in close proximity to the target nerve(s).  2. Ultrasound was also used to visualize the spread of the anesthetic in close proximity to the nerve(s) being blocked.  Local anesthetic was administered in incremental doses, with intermittent negative aspiration.    3. The nerve(s) appeared anatomically normal.  4. There were no apparent abnormal pathological findings.  5. A permanent ultrasound image was saved in the patient's record.    Pt tolerated well.    No complications.      The surgeon has given a verbal order transferring care of this patient to me for the performance of a regional analgesia block for post-op pain control. It is requested of me because I am uniquely trained and qualified to perform this block and the surgeon is neither trained nor qualified to perform this procedure.      FOR [facility] ([facility] [place]) ONLY:   Pain Team Contact information: please page the Pain Team Via [employer]. Search \"Pain\". During daytime hours, please page the attending first. At night please page the resident first.      "

## 2023-07-20 NOTE — PROCEDURES
POST OPERATIVE NOTE-IMMEDIATE :    Preoperative Diagnosis:  S/p I and D infected Right TKA  S/p Placement of high dose articulating antibiotic spacer    Postoperative Diagnosis:  same    Procedures:  Right TKA revision, all components  Removal of Right knee articulating antibiotic spacer    Prosthetic Devices:  See operative report    Surgeon(s) and Assistants (if any):    Surgeon(s):  Nikole Randall PA-C Anseth, Scott Duane, MD    Anesthesia:  General  Adductor canal block    Drains:  none    Specimens:  Tissue and fluid sent to microbiology    Complications:  none    Findings/Conclusions:  See dictated operative report    Estimated Blood Loss: 50 mL       Condition on discharge from OR:  Satisfactory    Plan:   1. Antibiotic Prophylaxis was given, 2 grams of ancef. Antibiotics given within 1 hour of surgical incision and discontinued when cultures are negative.  2. DVT prophylaxis aspirn and sequential compression devices will be started within 24hrs of surgery.  3. Weight Bearing TTWB on the right lower extremity  4. Discharge anticipated date when medically stable most likely Saturday or Sunday. Possibly home with home care  5. Pain Medication : Oxycodone, Tylenol and Celebrex  6. Prevena Wound VAC for 14 days postoperatively  7. Extended oral antibiotic prophylaxis  8. ID consult  9. Remove ham[place] AM POD #1    Scott Duane Anseth, MD    @C(1)@ [facility]  [phone]

## 2023-07-20 NOTE — OP NOTE
Procedure Date: 07/20/2023    PREOPERATIVE DIAGNOSES:    1.  Status post right infected total knee.  2.  Status post I and D with removal of infected total knee and placement of high dose articulating antibiotic spacer.    POSTOPERATIVE DIAGNOSES:    1.  Status post right infected total knee.  2.  Status post I and D with removal of infected total knee and placement of high dose articulating antibiotic spacer.    PROCEDURE PERFORMED:  Right total knee revision with complete revision of all components and removal of articulating spacer.    SURGEON:  Scott Duane ,Anseth MD     ASSISTANTS:    1.  Nikole Randall PA-C.  2.  Waqas Uribe PA-C.    ANESTHESIA:    1.  General.  2.  Adductor canal block.    SPECIMENS:  Fluid and tissue sent to microbiology for cell count and culture.    ESTIMATED BLOOD LOSS:  50 mL    BLOOD REPLACEMENT:  None.    FLUIDS:  Per anesthesia.    COMPLICATIONS:  None.    DRAINS:  None.    TOURNIQUET TIME:  128 minutes at 250 mmHg.    INDICATIONS FOR PROCEDURE:  The patient is a very pleasant 72-year-old male who was in for followup with persistent swelling in his total knee.  Several years after being performed, his aspiration showed greater than 5000 WBCs, 90% neutrophils, elevated blood work, and he was diagnosed with a chronically infected right total knee.  He subsequently underwent debridement, removal of the total knee and placement of a high dose articulating spacer.  He has tolerated the spacer well.  His lab values returned to normal and he has been off his antibiotics now for a few weeks and his last aspiration showed normal cell count and differential in the synovial fluid without any growth.  He has always been a culture negative infection to date.  After discussing the risks, benefits, possible complications, and treatment alternatives, the patient wished to proceed with replantation of his knee in order to improve his lifestyle, reduce his pain, and increase his activity.    The  risks, benefits, possible complications, and treatment alternatives that were discussed included but were not limited to wear, loosening, infection, neurovascular damage, foot drop, thromboembolic disease, the physiological stresses of the surgery, limb length inequality, persistent pain, stiffness, and dislocation.  All of his questions were satisfactorily answered and he wished to proceed with joint replacement surgery again to improve his lifestyle and reduce his pain.    IMPLANTS:    1.  Femoral component is a Elizabeth Triathlon TS femur, right, size 7.  Distal augments are 5 mm medial and laterally, posterior augments are 5 mm medially and laterally.  Stem is a 15 x 50 stem.  2.  Tibial component is a [place] Triathlon universal baseplate, size 7, with a 15 x 50 mm stem and a +25 mm extension as well as 10 mm medial and lateral blocks.  3.  Tibial cone is a size E Triathlon Tritanium cone.  4.  Tibial insert is a [place] Triathlon X3 PS insert, 16 mm thick.  5.  Patellar component is a Elizabeth Triathlon X3 39 mm x 11 mm symmetric patella.  6.  Bone cement is Simplex radiopaque bone cement with tobramycin, 6 batches.    DESCRIPTION OF PROCEDURE:  The patient was brought to the operating room on a bed.  After adequate induction of anesthetic, a tourniquet was placed proximal on the right thigh and the right lower extremity prepped and draped free in the usual sterile fashion using Betadine scrub and ChloraPrep paint.  A timeout procedure was performed.  The leg was elevated and exsanguinated, flexed to 90 degrees, and tourniquet was inflated to 250 mmHg.    An anterior incision utilizing the previous scar was performed on the anterior aspect of the knee.  Full thickness flaps were created to allow for a medial parapatellar arthrotomy with a quad snip.  We then released the meniscal tibial ligament as feasible medially and laterally.  We released the majority of the scar tissue in the retropatellar space as well  and were able to dislocate the patella into the lateral gutter.  At this stage of the game, we recreated our medial and lateral gutters and suprapatellar pouch to allow appropriate mobilization of the patella and the extensor mechanism for access to the knee.  Once this was done, we were able to flex the knee to 90 degrees.  We used a 1/2-inch straight osteotome.  We broke up the bone/prosthesis interface between the cement and the polyethylene insert and we were able to remove that without difficulty.  We then turned our attention to the femur.    The femur was actually surprisingly well fixed.  We had to use thin flexible osteotomes.  We went around the femur circumferentially until it was dislodged and we removed it with retrograde impaction with minimal to no bone loss.  We then used curettes to debride the fibrotic interface on the anterior chamfer, distal posterior, posterior chamfer positions, removing any residual retained cement as necessary.  Once the femur was out of the way, we then were able to translate the tibia forward, using electrocautery to define the borders of the tibia medially, posteriorly, and laterally.  Once the retractors were placed, we then used a 1/2-inch osteotome to break up the bone cement here and remove it in large chunks until we could grasp the intramedullary portion and we removed the intramedullary dowel from the tibia and from the femur.  With the dowels out, we circumferentially went around the tibia to identify its bony anatomy.  Then we turned our attention to the tibial preparation.    Access to the tibia was gained using the tapered drill.  We then used Rogers curettes to remove any fibrotic tissue.  This was sent to the microbiology department for culture.  We then sequentially reamed the tibia all the way up to a 16, which engaged the isthmus.  With the 16 well-engaged the isthmus, we brought up our proximal tibial cutting guide and made a perpendicular freshening cut  to the long axis taking probably less than a millimeter across the tibia.  We then sized the tibia, sized for a size 7.  We pinned our trial in the appropriate amount of external rotation so that the center of the trial was between the middle and medial one-thirds of the tubercle.  We punched and then drilled for the keel and the post.  We had a fairly large metaphyseal defect so, after we punched, we put our reamer back in and then we reamed with a conical reamer for a cone.  The patient is a big manuel, and so we reamed all the way up to a size E and we were even able to recess that slightly below the new cut proximal tibial surface.  We then placed our trial in with 5 mm augments medially and laterally and we turned our attention to the femur.    We gained access to the medullary canal of the femur. With the Rogers's, we removed that fibrotic membrane.  We sent some of that to microbiology for culture and cell count.  We then sequentially reamed the medullary canal of the femur all the way up to a 19, where we engaged the isthmus.  We then brought up our distal femoral cutting guide.  We pinned it in place.  It was set for 6 degrees of valgus.  We used just a skinning cut to get our initial location.  Then we ended up taking enough bone for 5 mm augments distally.  We then brought up our 4-in-1 cutting guide with the 5 mm augments on it.  We pinned this in the appropriate amount of external rotation so that the block matched the transepicondylar axis at approximately 3 degrees and it was perpendicular to [place]'s line.  Anterior cut just freshened the bone slightly, anterior chamfer cut freshened the bone slightly, posterior cut was an air ball as usual, and then the posterior condylar cut, the 5 mm augment gave us good bony apposition and good bony contact.  After cutting this, we then brought up our posterior stabilized cutting guide.  We pinned this in place.  We cut for the posterior stabilized box.  Then  we reamed distally up to a 20.  We then removed the cutting guide, removed the bony fragment, with the PCL attachment we debrided the posterior aspect of the knee of any residual fibrotic tissue and scar.  We used a file to make sure that we had a smooth, well contoured block for the total stabilized condylar femur to fit in.  Then we assembled our femur, 5 mm distal augments medially and laterally, 5 mm posterior augments with a size 7 and a 15 stem.  We inserted it down.  It seated very nicely against the bone, appropriate position and size, no evidence of any rocking.  We then trialed the knee.  We were pretty loose with a 16 all the way around.  So then we took it apart.  We put 10 mm augments on the tibia and now with a 16 mm augment in, the knee came out to full extension, excellent stability to varus and valgus stresses.  It bent up well, had excellent stability to anterior and posterior stresses and our patellar tracking appeared to be appropriate.  Satisfied, we then turned our attention to cementing the tibia.    Trial components were removed.  Tibia stabilized in anterior subluxed position.  We did check the tibial cut with intramedullary based checking apparatus and intramedullary base rasp.  We then placed a cement restrictor in the proximal tibia after irrigating it.  We then continued to irrigate the upper tibia.  We placed in our E Tritanium cone and then we vacuum mixed 3 batches of Simplex radiopaque bone cement with tobramycin.  The tibia was cemented in place. Again we took care to position the tibia in the appropriate amount of external rotation so that the center of the tray was between the middle and medial one-thirds of the tibial tubercle.  We held the tray in position until the cement had hardened.  Once the cement hardened, we inspected the periphery of the component, removed any extravasation of cement as necessary.  We then retrialed the knee.  Again, we found with the size 7 femur with  5 mm distal and 5 mm posterior stems circumferentially, we had excellent stability with a 16 PS insert.  Satisfied, we removed the trials.  We irrigated the femur copiously.  We then placed 2 cement restrictors.  We stacked 2 cement restrictors in the femoral medullary canal to keep any of them from being blown out. We then mixed up 3 batches of Simplex radiopaque bone cement and we cemented in our femoral component.  Again, we impacted this down until it was seated well on the medial side with the appropriate amount of external rotation, and we held it in position.  Excess cement was removed with [place] elevators.    We also flipped over the knee cap.  We shaved the kneecap down.  We had a good cut on it and it was well protected by the bony cover.  We just freshened that cut guide, got down to about 16 mm circumferentially.  It was a 39 patella before, and we drilled the peg holes for the 39.  We brought up our 39 patella and we cemented this in place and stabilized the patellar components with patellar clamp.  The wound was irrigated with warm saline and then dilute Betadine until the cement had hardened.  Once the cement had hardened, we let the tourniquet down at 128 minutes.  We then trialed the knee with the tourniquet down with a 16 PS insert in and the knee came out to full extension.  It flexed fully to 125 degrees, had excellent stability to varus and valgus stresses at zero, 30 and 90, and our patellar tracking was ideal, engaging both condyles at 90 degrees without tilt or subluxation.  Satisfied that the knee was well balanced, appropriately positioned with a good alignment and patellar tracking, we then removed the trial and brought up our size 16 PS insert and we impacted it into position.  Locking mechanism was checked and was found to be stable.  With this, we irrigated the wounds copiously and began a layered closure.  Final range of motion, alignment, stability, and patellar tracking were  outstanding.  We then began the layered closure.    The arthrotomy was closed using #1 Vicryl in interrupted fashion.  The subcutaneous tissues were closed using 2-0 Vicryl in a buried interrupted fashion.  The skin was brought together everted and approximated with staples.  Sterile dressings were applied.  The patient was awakened and transported to postanesthesia care in stable condition.  At the end of the case, sponge and needle counts were correct.    Hemostasis was obtained throughout the procedure and prior to the closure of each layer using electrocautery.  Pulsatile irrigation and dilute Betadine irrigation were used during the procedure.  Surgical team body exhaust systems and high exchange airflow were used during the procedure as well.  The patient did receive 2 grams of Ancef within 1 hour of the onset of procedure.    POSTOPERATIVE PLAN:    1.  Touchdown weightbearing on the right lower extremity for the next 2-6 weeks.  2.  Antibiotic prophylaxis will be with Ancef 2 grams IV every 8 hours x2 doses to be concluded 24 hours postop.  3.  DVT prophylaxis will be with aspirin 81 mg 1 p.o. b.i.d. for the next 6 weeks.  4.  Pain control will be with a combination of oxycodone, Tylenol, and Celebrex.  5.  We will plan on a Prevena wound VAC for the next 2 weeks until the first postoperative visit.  6.  We will plan on using a hinged range of motion brace, lock the knee out in extension for the first week to allow it to rest, and then begin gentle range of motion.  7.  Prevena wound VAC for a full 14 days postop.  8.  We will get an ID consult.  9.  We will plan on extended oral antibiotic prophylaxis for at least 14 days postop and we will discuss with infectious disease.    Scott Duane Anseth, MD        D: 2023   T: 2023   MT: md    Name:     ACOSTA MARTINEZ  MRN:      [id number]        Account:        [id number]   :      1950           Procedure Date: 2023     Document:  [id number]

## 2023-07-20 NOTE — ANESTHESIA CARE TRANSFER NOTE
Patient: Bridger Castillo    Procedure: Procedure(s):  Right Total Knee Arthroplasty Revision       Diagnosis: 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) [T84.53XA]  Diagnosis Additional Information: No value filed.    Anesthesia Type:   General     Note:    Oropharynx: oropharynx clear of all foreign objects and spontaneously breathing  Level of Consciousness: awake  Oxygen Supplementation: face mask  Level of Supplemental Oxygen (L/min / FiO2): 6  Independent Airway: airway patency satisfactory and stable  Dentition: dentition unchanged  Vital Signs Stable: post-procedure vital signs reviewed and stable  Report to RN Given: handoff report given  Patient transferred to: PACU  Comments: Neuromuscular blockade reversed after TOF 4/4, spontaneous respirations, adequate tidal volumes, followed commands to voice, oropharynx suctioned with soft flexible catheter, extubated atraumatically, extubated with suction, airway patent after extubation.  Oxygen via facemask at 6 liters per minute to PACU. Oxygen tubing connected to wall O2 in PACU, SpO2, NiBP, and EKG monitors and alarms on and functioning, Eleazar Hugger warmer connected to patient gown, report on patient's clinical status given to PACU RN, RN questions answered.     Handoff Report: Identifed the Patient, Identified the Reponsible Provider, Reviewed the pertinent medical history, Discussed the surgical course, Reviewed Intra-OP anesthesia mangement and issues during anesthesia, Set expectations for post-procedure period and Allowed opportunity for questions and acknowledgement of understanding      Vitals:  Vitals Value Taken Time   /86 07/20/23 1727   Temp     Pulse 99 07/20/23 1729   Resp 22 07/20/23 1729   SpO2 99 % 07/20/23 1729   Vitals shown include unvalidated device data.    Electronically Signed By: JIMY Burroughs CRNA  July 20, 2023  5:30 PM

## 2023-07-20 NOTE — ANESTHESIA PREPROCEDURE EVALUATION
Anesthesia Pre-Procedure Evaluation    Patient: Bridger Castillo   MRN: [id number] : 1950        Procedure : Procedure(s):  Right Total Knee Arthroplasty Revision          Past Medical History:   Diagnosis Date     Arthritis      CKD (chronic kidney disease) stage 3, GFR 30-59 ml/min (H)      Degenerative joint disease      Hyperlipemia      Hypertension      Ischemic cardiomyopathy      Left carpal tunnel syndrome      Myocardial infarction (H)      STEMI     Post PTCA     JENNIFER to RCA  stent   different artery      Past Surgical History:   Procedure Laterality Date     ARTHROPLASTY REVISION KNEE Right 2023    Procedure: Right Total Knee Arthroplasty Removal, Replacement with Antibiotic Spacer;  Surgeon: Anseth, Scott Duane, MD;  Location:  OR     COLONOSCOPY N/A 2020    Procedure: COLONOSCOPY, WITH POLYPECTOMY AND BIOPSY;  Surgeon: Luis Beach MD;  Location:  GI     CV CORONARY ANGIOGRAM N/A 5/15/2020    Procedure: Coronary Angiogram;  Surgeon: August Hartmann MD;  Location:  HEART CARDIAC CATH LAB     CV PCI STENT DRUG ELUTING N/A 5/15/2020    Procedure: Percutaneous Coronary Intervention Stent Drug Eluting;  Surgeon: August Hartmann MD;  Location:  HEART CARDIAC CATH LAB     HEART CATH, ANGIOPLASTY      JENNIFER RCA , 70% stenosis LAD     ORTHOPEDIC SURGERY      right knee replacement      No Known Allergies   Social History     Tobacco Use     Smoking status: Never     Smokeless tobacco: Never   Substance Use Topics     Alcohol use: Never     Alcohol/week: 1.0 standard drink of alcohol     Types: 1 Cans of beer per week      Wt Readings from Last 1 Encounters:   23 84.2 kg (185 lb 11.2 oz)        Anesthesia Evaluation   Pt has had prior anesthetic.     No history of anesthetic complications       ROS/MED HX  ENT/Pulmonary:    (-) sleep apnea   Neurologic:    (-) no CVA   Cardiovascular:     (+) Dyslipidemia hypertension--CAD --stent-     METS/Exercise Tolerance:     Hematologic:       Musculoskeletal:       GI/Hepatic:    (-) GERD   Renal/Genitourinary:     (+) renal disease, type: CRI,     Endo:    (-) Type II DM   Psychiatric/Substance Use:       Infectious Disease:       Malignancy:       Other:            Physical Exam    Airway        Mallampati: II   TM distance: > 3 FB   Neck ROM: full   Mouth opening: > 3 cm    Respiratory Devices and Support         Dental       (+) Minor Abnormalities - some fillings, tiny chips      Cardiovascular   cardiovascular exam normal          Pulmonary   pulmonary exam normal                OUTSIDE LABS:  CBC:   Lab Results   Component Value Date    WBC 6.7 05/29/2023    WBC 8.0 05/22/2023    HGB 12.9 (L) 05/29/2023    HGB 12.7 (L) 05/22/2023    HCT 40.5 05/29/2023    HCT 38.8 (L) 05/22/2023     05/29/2023     05/22/2023     BMP:   Lab Results   Component Value Date     04/19/2023     (L) 04/18/2023    POTASSIUM 4.6 04/18/2023    POTASSIUM 4.2 05/15/2020    CHLORIDE 98 04/18/2023    CHLORIDE 105 05/15/2020    CO2 22 04/18/2023    CO2 26 05/15/2020    BUN 21.3 04/18/2023    BUN 27 05/15/2020    CR 1.61 (H) 05/29/2023    CR 1.65 (H) 05/25/2023     (H) 04/19/2023     (H) 04/18/2023     (H) 04/18/2023     COAGS:   Lab Results   Component Value Date    PTT 31 05/15/2020    INR 1.02 05/15/2020     POC:   Lab Results   Component Value Date     (H) 05/30/2010     HEPATIC:   Lab Results   Component Value Date    PROTTOTAL 7.6 10/21/2014    ALT 16 05/29/2019    AST 47 04/25/2023    ALKPHOS 64 10/21/2014    BILITOTAL 1.2 10/21/2014     OTHER:   Lab Results   Component Value Date    RYANNE 9.3 04/18/2023    SED 48 (H) 04/17/2023       Anesthesia Plan    ASA Status:  3   NPO Status:  NPO Appropriate    Anesthesia Type: General.     - Airway: ETT   Induction: Propofol, Intravenous.   Maintenance: Balanced.        Consents    Anesthesia Plan(s) and associated risks,  benefits, and realistic alternatives discussed. Questions answered and patient/representative(s) expressed understanding.    - Discussed:     - Discussed with:  Patient         Postoperative Care    Pain management: Multi-modal analgesia.   PONV prophylaxis: Ondansetron (or other 5HT-3), Dexamethasone or Solumedrol, Background Propofol Infusion     Comments:                Mague Recinos MD, MD

## 2023-07-20 NOTE — ANESTHESIA PROCEDURE NOTES
Airway       Patient location during procedure: OR       Procedure Start/Stop Times: 7/20/2023 1:43 PM  Staff -        Anesthesiologist:  Felix Culver MD       CRNA: Liss Rolle APRN CRNA       Performed By: CRNA  Consent for Airway        Urgency: elective  Indications and Patient Condition       Indications for airway management: landon-procedural       Induction type:intravenous       Mask difficulty assessment: 1 - vent by mask    Final Airway Details       Final airway type: endotracheal airway       Successful airway: Oral  Endotracheal Airway Details        Cuffed: yes       Successful intubation technique: direct laryngoscopy       DL Blade Type: MAC 4       Grade View of Cords: 1       Adjucts: stylet       Position: Right       Measured from: lips       Secured at (cm): 22       Bite block used: None    Post intubation assessment        Placement verified by: capnometry, equal breath sounds and chest rise        Number of attempts at approach: 1       Secured with: commercial tube simon and pink tape       Ease of procedure: easy       Dentition: Intact and Unchanged    Medication(s) Administered   Medication Administration Time: 7/20/2023 1:43 PM

## 2023-07-21 ENCOUNTER — APPOINTMENT (OUTPATIENT)
Dept: PHYSICAL THERAPY | Facility: CLINIC | Age: 73
End: 2023-07-21
Attending: ORTHOPAEDIC SURGERY
Payer: COMMERCIAL

## 2023-07-21 LAB
ANION GAP SERPL CALCULATED.3IONS-SCNC: 13 MMOL/L (ref 7–15)
BUN SERPL-MCNC: 29.5 MG/DL (ref 8–23)
CALCIUM SERPL-MCNC: 8.8 MG/DL (ref 8.8–10.2)
CHLORIDE SERPL-SCNC: 103 MMOL/L (ref 98–107)
CREAT SERPL-MCNC: 1.7 MG/DL (ref 0.67–1.17)
DEPRECATED HCO3 PLAS-SCNC: 20 MMOL/L (ref 22–29)
GFR SERPL CREATININE-BSD FRML MDRD: 43 ML/MIN/1.73M2
GLUCOSE SERPL-MCNC: 179 MG/DL (ref 70–99)
HGB BLD-MCNC: 11.1 G/DL (ref 13.3–17.7)
POTASSIUM SERPL-SCNC: 4.8 MMOL/L (ref 3.4–5.3)
SODIUM SERPL-SCNC: 136 MMOL/L (ref 136–145)

## 2023-07-21 PROCEDURE — 250N000013 HC RX MED GY IP 250 OP 250 PS 637: Performed by: NURSE PRACTITIONER

## 2023-07-21 PROCEDURE — 97116 GAIT TRAINING THERAPY: CPT | Mod: GP

## 2023-07-21 PROCEDURE — 250N000013 HC RX MED GY IP 250 OP 250 PS 637: Performed by: SPECIALIST/TECHNOLOGIST

## 2023-07-21 PROCEDURE — 97530 THERAPEUTIC ACTIVITIES: CPT | Mod: GP

## 2023-07-21 PROCEDURE — 97161 PT EVAL LOW COMPLEX 20 MIN: CPT | Mod: GP

## 2023-07-21 PROCEDURE — 80048 BASIC METABOLIC PNL TOTAL CA: CPT | Performed by: NURSE PRACTITIONER

## 2023-07-21 PROCEDURE — 85018 HEMOGLOBIN: CPT | Performed by: SPECIALIST/TECHNOLOGIST

## 2023-07-21 PROCEDURE — 99232 SBSQ HOSP IP/OBS MODERATE 35: CPT | Performed by: INTERNAL MEDICINE

## 2023-07-21 PROCEDURE — 36415 COLL VENOUS BLD VENIPUNCTURE: CPT | Performed by: NURSE PRACTITIONER

## 2023-07-21 PROCEDURE — 258N000003 HC RX IP 258 OP 636: Performed by: NURSE PRACTITIONER

## 2023-07-21 PROCEDURE — 250N000011 HC RX IP 250 OP 636: Mod: JZ | Performed by: SPECIALIST/TECHNOLOGIST

## 2023-07-21 PROCEDURE — 99254 IP/OBS CNSLTJ NEW/EST MOD 60: CPT | Performed by: INTERNAL MEDICINE

## 2023-07-21 PROCEDURE — 120N000001 HC R&B MED SURG/OB

## 2023-07-21 RX ORDER — DOXYCYCLINE 100 MG/1
100 CAPSULE ORAL EVERY 12 HOURS SCHEDULED
Status: DISCONTINUED | OUTPATIENT
Start: 2023-07-22 | End: 2023-07-22 | Stop reason: HOSPADM

## 2023-07-21 RX ORDER — SODIUM CHLORIDE 9 MG/ML
INJECTION, SOLUTION INTRAVENOUS CONTINUOUS
Status: DISCONTINUED | OUTPATIENT
Start: 2023-07-21 | End: 2023-07-21

## 2023-07-21 RX ADMIN — ASPIRIN 81 MG: 81 TABLET, COATED ORAL at 09:36

## 2023-07-21 RX ADMIN — FAMOTIDINE 20 MG: 20 TABLET ORAL at 09:36

## 2023-07-21 RX ADMIN — CEFAZOLIN SODIUM 2 G: 2 INJECTION, SOLUTION INTRAVENOUS at 05:11

## 2023-07-21 RX ADMIN — CEFAZOLIN SODIUM 2 G: 2 INJECTION, SOLUTION INTRAVENOUS at 20:54

## 2023-07-21 RX ADMIN — SENNOSIDES AND DOCUSATE SODIUM 1 TABLET: 50; 8.6 TABLET ORAL at 09:36

## 2023-07-21 RX ADMIN — ASPIRIN 81 MG: 81 TABLET, COATED ORAL at 20:53

## 2023-07-21 RX ADMIN — ACETAMINOPHEN 975 MG: 325 TABLET, FILM COATED ORAL at 14:07

## 2023-07-21 RX ADMIN — ACETAMINOPHEN 975 MG: 325 TABLET, FILM COATED ORAL at 20:53

## 2023-07-21 RX ADMIN — POLYETHYLENE GLYCOL 3350 17 G: 17 POWDER, FOR SOLUTION ORAL at 09:36

## 2023-07-21 RX ADMIN — METOPROLOL SUCCINATE 25 MG: 25 TABLET, EXTENDED RELEASE ORAL at 09:36

## 2023-07-21 RX ADMIN — SODIUM CHLORIDE: 9 INJECTION, SOLUTION INTRAVENOUS at 02:18

## 2023-07-21 RX ADMIN — SENNOSIDES AND DOCUSATE SODIUM 1 TABLET: 50; 8.6 TABLET ORAL at 20:53

## 2023-07-21 RX ADMIN — CEFAZOLIN SODIUM 2 G: 2 INJECTION, SOLUTION INTRAVENOUS at 15:22

## 2023-07-21 RX ADMIN — ACETAMINOPHEN 975 MG: 325 TABLET, FILM COATED ORAL at 05:11

## 2023-07-21 ASSESSMENT — ACTIVITIES OF DAILY LIVING (ADL)
ADLS_ACUITY_SCORE: 26
DEPENDENT_IADLS:: INDEPENDENT

## 2023-07-21 NOTE — CONSULTS
[facility]    Infectious Disease Consultation     Date of Admission:  7/20/2023  Date of Consult (When I saw the patient): 07/21/23    Assessment & Plan   Bridger Castillo is a 72 year old who was admitted on 7/20/2023.     Impression  1. 71 yo had a R TKA placed 8 years ago who was recently hospitalized with acute onset right knee pain. Synovial fluid analysis consistent with infection with 22,310 Wbc with PMN predominance. . Blood cxs and synovial fluid cxs remained negative. He underwent right knee I&D with removal of prosthesis and placement of antibiotic spacer on 4/17. Cxs remained negative for growth. He finished a course of  IV ceftriaxone and Vancomycin  For 6 weeks until 5/29.  2. His lab values returned to normal and he has been off his antibiotics now for a few weeks and his last aspiration showed normal cell count and differential in the synovial fluid without any growth  3.  s/p revision TKA on 7/ 20.   4. ID consulted for oral antibiotics prophylaxis post revision.     Recommendations:   With no data on the previous cultures hard to come up with an optimal oral antibiotics plan for prophylaxis.   Can probably use a combination of Doxycycline 100 mg BID + augmentin 875 mg BID which provides broad coverage.   Please continue to follow up on the cultures from yesterday and call ID if any positive micro.         Aj Sawant MD    Reason for Consult   Reason for consult: I was asked to evaluate this patient for TKA infection .    Primary Care Physician   GAURAV CARNEY    Chief Complaint   Revision TKA     History is obtained from the patient and medical records    History of Present Illness   Bridger Castillo is a 72 year old male who presented for revision TKA   See more history in A and P above     Past Medical History   I have reviewed this patient's medical history and updated it with pertinent information if needed.   Past Medical History:   Diagnosis Date     Arthritis       CKD (chronic kidney disease) stage 3, GFR 30-59 ml/min (H)      Degenerative joint disease      Hyperlipemia      Hypertension      Ischemic cardiomyopathy      Left carpal tunnel syndrome      Myocardial infarction (H)     2011 STEMI     Post PTCA     JENNIFER to RCA 2011/// 2nd stent  2020 different artery       Past Surgical History   I have reviewed this patient's surgical history and updated it with pertinent information if needed.  Past Surgical History:   Procedure Laterality Date     ARTHROPLASTY REVISION KNEE Right 4/17/2023    Procedure: Right Total Knee Arthroplasty Removal, Replacement with Antibiotic Spacer;  Surgeon: Anseth, Scott Duane, MD;  Location:  OR     COLONOSCOPY N/A 7/16/2020    Procedure: COLONOSCOPY, WITH POLYPECTOMY AND BIOPSY;  Surgeon: Luis Beach MD;  Location:  GI     CV CORONARY ANGIOGRAM N/A 5/15/2020    Procedure: Coronary Angiogram;  Surgeon: August Hartmann MD;  Location:  HEART CARDIAC CATH LAB     CV PCI STENT DRUG ELUTING N/A 5/15/2020    Procedure: Percutaneous Coronary Intervention Stent Drug Eluting;  Surgeon: August Hartmann MD;  Location:  HEART CARDIAC CATH LAB     HEART CATH, ANGIOPLASTY      JENNIFER RCA 2011, 70% stenosis LAD     ORTHOPEDIC SURGERY      right knee replacement       Prior to Admission Medications   Prior to Admission Medications   Prescriptions Last Dose Informant Patient Reported? Taking?   B Complex Vitamins (B COMPLEX PO) 7/12/2023 at am Self Yes Yes   Sig: Take 1 tablet by mouth daily   Cholecalciferol (VITAMIN D) 2000 UNITS CAPS 7/12/2023 at am Self Yes Yes   Sig: Take 1 capsule by mouth daily   aspirin 81 MG EC tablet 7/12/2023 at am Self Yes Yes   Sig: Take 1 tablet by mouth daily   cinnamon 500 MG CAPS 7/12/2023 at am Self Yes Yes   Sig: Take 1 capsule by mouth daily   diphenhydrAMINE-acetaminophen (TYLENOL PM)  MG tablet 7/19/2023 at pm Self Yes Yes   Sig: Take 1 tablet by mouth At Bedtime   ezetimibe  (ZETIA) 10 MG tablet 7/19/2023 at pm Self No Yes   Sig: Take 1 tablet (10 mg) by mouth every evening   lisinopril (ZESTRIL) 20 MG tablet 7/19/2023 at am Self Yes Yes   Sig: Take 0.5 tablets by mouth daily (0.5 x 20 mg = 10 mg)   metoprolol succinate ER (TOPROL XL) 25 MG 24 hr tablet 7/20/2023 at am Self No Yes   Sig: Take 1 tablet (25 mg) by mouth daily   nitroGLYcerin (NITROSTAT) 0.4 MG sublingual tablet Unknown at prn Self No Yes   Sig: Place 1 tablet (0.4 mg) under the tongue every 5 minutes as needed for chest pain   prasugrel (EFFIENT) 10 MG TABS tablet 7/12/2023 at am Self No Yes   Sig: Take 1 tablet (10 mg) by mouth daily   rosuvastatin (CRESTOR) 40 MG tablet 7/19/2023 at pm Self No Yes   Sig: Take 1 tablet (40 mg) by mouth daily      Facility-Administered Medications: None     Allergies   No Known Allergies    Immunization History   Immunization History   Administered Date(s) Administered     COVID-19 Bivalent 18+ (Moderna) 01/11/2023     COVID-19 Monovalent 18+ (Moderna) 02/26/2021, 03/26/2021, 11/16/2021, 04/29/2022       Social History   I have reviewed this patient's social history and updated it with pertinent information if needed. Bridger KATHLEEN Jonathan  reports that he has never smoked. He has never used smokeless tobacco. He reports that he does not drink alcohol and does not use drugs.    Family History   I have reviewed this patient's family history and updated it with pertinent information if needed.   Family History   Problem Relation Age of Onset     Hypertension Brother      Hypertension Father      Cancer - colorectal Father      Cancer Mother         lung     Other - See Comments Brother         heamachromatosis     Coronary Artery Disease Brother 61        MI       Review of Systems   The 10 point Review of Systems is negative    Physical Exam   Temp: 97.5  F (36.4  C) Temp src: Oral BP: 136/70 Pulse: 88   Resp: 20 SpO2: 96 % O2 Device: None (Room air) Oxygen Delivery: 2 LPM  Vital Signs with  Ranges  Temp:  [97.5  F (36.4  C)-98  F (36.7  C)] 97.5  F (36.4  C)  Pulse:  [] 88  Resp:  [13-26] 20  BP: (129-165)/(70-93) 136/70  SpO2:  [96 %-100 %] 96 %  194 lbs 1.6 oz  Body mass index is 26.32 kg/m .    GENERAL APPEARANCE:  awake  EYES: Eyes grossly normal to inspection  NECK: no adenopathy  RESP: lungs clear   CV: regular rates and rhythm  LYMPHATICS: normal ant/post cervical and supraclavicular nodes  ABDOMEN: soft, nontender  MS: extremities normal  SKIN: no suspicious lesions or rashes        Data   Lab Results   Component Value Date    WBC 6.7 05/29/2023    HGB 11.1 (L) 07/21/2023    HCT 40.5 05/29/2023     05/29/2023     07/21/2023    POTASSIUM 4.8 07/21/2023    CHLORIDE 103 07/21/2023    CO2 20 (L) 07/21/2023    BUN 29.5 (H) 07/21/2023    CR 1.70 (H) 07/21/2023     (H) 07/21/2023    SED 48 (H) 04/17/2023    TROPI 16.100 (HH) 12/20/2011    AST 47 04/25/2023    ALT 16 05/29/2019    ALKPHOS 64 10/21/2014    BILITOTAL 1.2 10/21/2014    INR 1.02 05/15/2020     No results for input(s): CULT in the last 168 hours.  No lab results found.    Invalid input(s): UC       All cultures:  No results for input(s): CULTURE in the last 168 hours.   Blood culture:  No results found for this or any previous visit.   Urine culture:  No results found for this or any previous visit.

## 2023-07-21 NOTE — PROGRESS NOTES
Orthopedic Surgery  Bridger Castillo  2023  Admit Date:  2023  POD 1 Day Post-Op  S/P Procedure(s):  Right Total Knee Arthroplasty Revision    Patient is feeling well.  Pain controlled.  Tolerating oral intake.  No events overnight. Discussed pain and recovery expectations with patient.  Discharge instructions reviewed.    Alert and orient to person, place, and time.  Vital Sign Ranges  Temperature Temp  Av.7  F (36.5  C)  Min: 97.5  F (36.4  C)  Max: 98  F (36.7  C)   Blood pressure Systolic (24hrs), Av , Min:129 , Max:165        Diastolic (24hrs), Av, Min:70, Max:93      Pulse Pulse  Av.5  Min: 86  Max: 101   Respirations Resp  Av.7  Min: 13  Max: 26   Pulse oximetry SpO2  Av.5 %  Min: 96 %  Max: 100 %       Right knee derssing is clean, dry, and intact. Vac canister is empty.  Minimal erythema of the surrounding skin.  Bilateral calves are soft, non-tender.  right lower extremity is NVI.    Labs:  Recent Labs   Lab Test 23  0713 23  1053 23  0806   POTASSIUM 4.8 4.2 4.6     Recent Labs   Lab Test 23  0713 23  0830 23  0805   HGB 11.1* 12.9* 12.7*     Recent Labs   Lab Test 05/15/20  0930   INR 1.02     Recent Labs   Lab Test 23  0830 23  0805 05/15/23  0815    239 240       A/P  1. S/p revision right TKA   Continue aspirin for DVT prophylaxis.     Mobilize with PT/OT TDWB.     Continue current pain regimen   ID consult, appreciate input, ortho would prefer at least 4 weeks of oral therapy since this was a culture negative infection   Leave dressing undisturbed    Hinged knee brace locked in extension when up    2. Disposition   Anticipate d/c to home tomorrow    Kjerstin L Foss, PA-C

## 2023-07-21 NOTE — PROGRESS NOTES
Patient vital signs are at baseline: Yes  Patient able to ambulate as they were prior to admission or with assist devices provided by therapies during their stay:  Yes, Ax1 crutches and GB  Patient MUST void prior to discharge:  Yes, hamilton removed this AM. Voiding well.  Patient able to tolerate oral intake:  Yes  Pain has adequate pain control using Oral analgesics:  Yes  Does patient have an identified :  Yes, wife at bedside  Has goal D/C date and time been discussed with patient:  Yes, tomorrow

## 2023-07-21 NOTE — PLAN OF CARE
07/21/23 1012   Appointment Info   Signing Clinician's Name / Credentials (PT) Aubree Lemon DPT   Rehab Comments (PT) (S)  TTWB RLE, hinged knee brace locked in extension at all times   Living Environment   People in Home spouse   Current Living Arrangements house   Transportation Anticipated family or friend will provide   Living Environment Comments Pt has 5+7 stairs to upstairs with railing on L side.   Self-Care   Usual Activity Tolerance good   Current Activity Tolerance fair   Equipment Currently Used at Home crutches   Activity/Exercise/Self-Care Comment pt uses crutches downstairs and walker upstairs, new railing in stalled on L side with easier  for pt   General Information   Onset of Illness/Injury or Date of Surgery 07/20/23   Referring Physician Anseth, Scott Duane, MD   Pertinent History of Current Problem (include personal factors and/or comorbidities that impact the POC) RLE removal of spacer, R knee revision, TTWB RLE knee locked in extension w/ hinged knee brace   Weight-Bearing Status - RLE toe touch weight-bearing   Cognition   Affect/Mental Status (Cognition) WNL   Integumentary/Edema   Integumentary/Edema Comments Wound vac, act bandage, hinged knee brace locked in extension RLE   Posture    Posture Forward head position   Range of Motion (ROM)   ROM Comment LLE WFL   Strength (Manual Muscle Testing)   Strength Comments Difficulty with RLE SLR wih knee immobilizer, grossly > 3/5 strength   Transfers   Comment, (Transfers) STS crutches CGA   Gait/Stairs (Locomotion)   Distance in Feet 3' eval, 100' treatment   Comment, (Gait/Stairs) CGA crutches, NWB RLE, no LOB, slow pace   Balance   Balance Comments good dynamic balance with use of crutches   Sensory Examination   Sensory Perception Comments intact to light touch   Clinical Impression   Criteria for Skilled Therapeutic Intervention Yes, treatment indicated   PT Diagnosis (PT) impaired IND with transfers and gait   Influenced by the  following impairments post op weakness, TTWB RLE, impaired RLE knee ROM 2/2 locked in extension   Functional limitations due to impairments impaired IND with transfers, gait, stairs   Clinical Presentation (PT Evaluation Complexity) Stable/Uncomplicated   Clinical Presentation Rationale clinical judgement   Clinical Decision Making (Complexity) low complexity   Planned Therapy Interventions (PT) bed mobility training;cryotherapy;gait training;patient/family education;stair training;transfer training   Risk & Benefits of therapy have been explained evaluation/treatment results reviewed;care plan/treatment goals reviewed;risks/benefits reviewed;patient   PT Total Evaluation Time   PT Eval, Low Complexity Minutes (00221) 10   Plan of Care Review   Plan of Care Reviewed With patient   Physical Therapy Goals   PT Frequency Daily   PT Predicted Duration/Target Date for Goal Attainment 07/24/23   PT Goals Bed Mobility;Transfers;Stairs;Gait   PT: Bed Mobility Supine to/from sit;Supervision/stand-by assist   PT: Transfers Supervision/stand-by assist;Sit to/from stand;Bed to/from chair;Assistive device;Within precautions   PT: Gait Supervision/stand-by assist;100 feet;Crutches;Within precautions   PT: Stairs Supervision/stand-by assist;Greater than 10 stairs;Rail on left;Assistive device;Within precautions   PT Discharge Planning   PT Plan Bed mobility, stairs, transfers, gait   PT Discharge Recommendation (DC Rec) home with assist   PT Rationale for DC Rec Pt had recent surgery in april with spacer placed to R knee, TTWB with knee brace. Familiar with mobility and donning/doffing brace from previous surgery. Pt mobilizing well with crutches, performing NWB RLE with knee brace in extension, no LOB. mobilizing well in PT. No PT needs at DC. All equipment needs met at home. Recommend assist from family with household tasks, A x 1 on stairs   PT Brief overview of current status SBA transfers and gait with crutches   Total  Session Time   Total Session Time (sum of timed and untimed services) 10

## 2023-07-21 NOTE — CONSULTS
[facility]  Consult Note - Hospitalist Service  Date of Admission:  7/20/2023  Consult Requested by: Dr. 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  He previously has had removal of the infected prosthetic joint, articulated antibiotic spacer placed, long-term antimicrobials and on 7/20/2023 underwent removal of antibiotic spacer with revision of a new prosthetic joint.    removal of antibiotic spacer with revision of a new of a new prosthetic joint, right total knee on 7/20/2023 with Dr. Dominique  - Defer analgesia, mobility, therapies, antimicrobials, pharmacologic DVT prophylaxis to the primary surgical service    ischemic cardiomyopathy, with last LVEF 60-65% on exercise stress echo in 2020  CAD with drug-eluting stent to the LAD in 2020 off of dual antiplatelet therapy, prasugrel and aspirin for the last 7 days preoperatively  - Recommend resuming prasugrel and aspirin ASAP when safe to do so from a postoperative perspective    CKD 3, baseline creatinine 1.6 since May 2023  Prior hyponatremia  -Add on sodium to today's labs  - BMP in the a.m.  -Change IV fluids to 0.9% saline given his tendency to run hyponatremic    Hypertension  Hyperlipidemia  -Hold ACE inhibitor, can resume on the a.m. of 7/21/2023 pending potassium and sodium levels  - Resume metoprolol with hold parameters    Chronic anemia, baseline 12 g range since May 2023 likely secondary to his CKD  - Hemoglobin anticipated for the morning     The patient's care was discussed with the Attending Physician, Dr. Amilcar Harper, Bedside Nurse and Patient.    Clinically Significant Risk Factors Present on Admission                # Drug Induced Platelet Defect: home  medication list includes an antiplatelet medication   # Hypertension: Noted on problem list      # Overweight: Estimated body mass index is 26.32 kg/m  as calculated from the following:    Height as of this encounter: 1.829 m (6').    Weight as of this encounter: 88 kg (194 lb 1.6 oz).        --increases all cause morbidity and mortality  -OP follow up re: weight loss & lifestyle changes       [facility]IM[facility]  Hospitalist Service  Securely message with [employer] (more info)  Text page via 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  He previously has had removal of the infected prosthetic joint, articulated antibiotic spacer placed, long-term antimicrobials and on 7/20/2023 underwent removal of antibiotic spacer with revision of a new prosthetic joint.  Duration of his surgery was 4 hours.  He received general endotracheal anesthesia as well as peripheral nerve block.  Perioperative medications included dexamethasone, phenylephrine, 1.7 L LR.  Urine output was 700 mL, EBL 50 mL.  Hospitalist service was asked to assist with postoperative medical management    Patient has been holding his DAPT, which is he understands is lifelong, for the last 7 days.  He denies any cardiopulmonary symptoms thereafter.  Particularly denies any chest pain palpitations or dyspnea.  No bleeding, fevers chills cough syncope anorexia GI  complaints.  No weakness or falls.  He last took his ACE inhibitor on the a.m. of 7/19/2023, last dose of metoprolol was the morning of surgery.  Home BPs usually run less than 100 up to 100  teens systolic.      Past Medical History    Past Medical History:   Diagnosis Date     Arthritis      CKD (chronic kidney disease) stage 3, GFR 30-59 ml/min (H)      Degenerative joint disease      Hyperlipemia      Hypertension      Ischemic cardiomyopathy      Left carpal tunnel syndrome      Myocardial infarction (H)     2011 STEMI     Post PTCA     JENNIFER to RCA 2011/// 2nd stent  2020 different artery       Past Surgical History   Past Surgical History:   Procedure Laterality Date     ARTHROPLASTY REVISION KNEE Right 4/17/2023    Procedure: Right Total Knee Arthroplasty Removal, Replacement with Antibiotic Spacer;  Surgeon: Anseth, Scott Duane, MD;  Location:  OR     COLONOSCOPY N/A 7/16/2020    Procedure: COLONOSCOPY, WITH POLYPECTOMY AND BIOPSY;  Surgeon: Luis Beach MD;  Location:  GI     CV CORONARY ANGIOGRAM N/A 5/15/2020    Procedure: Coronary Angiogram;  Surgeon: August Hartmann MD;  Location:  HEART CARDIAC CATH LAB     CV PCI STENT DRUG ELUTING N/A 5/15/2020    Procedure: Percutaneous Coronary Intervention Stent Drug Eluting;  Surgeon: August Hartmann MD;  Location:  HEART CARDIAC CATH LAB     HEART CATH, ANGIOPLASTY      JENNIFER RCA 2011, 70% stenosis LAD     ORTHOPEDIC SURGERY      right knee replacement       Medications   Medications Prior to Admission   Medication Sig Dispense Refill Last Dose     aspirin 81 MG EC tablet Take 1 tablet by mouth daily   7/12/2023 at am     B Complex Vitamins (B COMPLEX PO) Take 1 tablet by mouth daily   7/12/2023 at am     Cholecalciferol (VITAMIN D) 2000 UNITS CAPS Take 1 capsule by mouth daily   7/12/2023 at am     cinnamon 500 MG CAPS Take 1 capsule by mouth daily   7/12/2023 at am     diphenhydrAMINE-acetaminophen (TYLENOL PM)  MG tablet Take 1 tablet by mouth At Bedtime   7/19/2023 at pm     ezetimibe (ZETIA) 10 MG tablet Take 1 tablet (10 mg) by mouth every evening 90 tablet 2 7/19/2023 at pm     lisinopril (ZESTRIL) 20 MG  tablet Take 0.5 tablets by mouth daily (0.5 x 20 mg = 10 mg)   7/19/2023 at am     metoprolol succinate ER (TOPROL XL) 25 MG 24 hr tablet Take 1 tablet (25 mg) by mouth daily 90 tablet 1 7/20/2023 at am     nitroGLYcerin (NITROSTAT) 0.4 MG sublingual tablet Place 1 tablet (0.4 mg) under the tongue every 5 minutes as needed for chest pain 25 tablet 11 Unknown at prn     prasugrel (EFFIENT) 10 MG TABS tablet Take 1 tablet (10 mg) by mouth daily 90 tablet 2 7/12/2023 at am     rosuvastatin (CRESTOR) 40 MG tablet Take 1 tablet (40 mg) by mouth daily 90 tablet 2 7/19/2023 at pm       Physical Exam   Vital Signs: Temp: 97.7  F (36.5  C) Temp src: Oral BP: (!) 145/78 Pulse: 94   Resp: 19 SpO2: 97 % O2 Device: None (Room air) Oxygen Delivery: 2 LPM  Weight: 194 lbs 1.6 oz    Constitutional: vs as per EMR  General:  adult pt lying in bed without acute distress  Neuro: +follows commands wiggle toes and show 2 fingers bilat, face symmetric, tongue midline, speech fluent  Eyes pupils equal round 3mm briskly reactive bilat, sclera nonicteric, noninjected, conjunctiva pink,  Head, ENT & mouth: NC/AT,  mouth moist oral mucosa  Neck: supple  CV S1S2  resp: CTAB upper lobes  gi:normoactive bowel sounds, soft, nontender, nondisteded  Ext: no edema or mottling, right lower extremity is Ace wrapped thigh, to toe.  Movement, sensation preserved, brisk capillary refill, easily palpable dorsalis pedis pulse  Skin: no rashes on exposed skin  Lymph: defer  Musculoskeletal no bony joint deformities    Medical Decision Making       25 MINUTES SPENT BY ME on the date of service doing chart review, history, exam, documentation & further activities per the note.      Data     I have personally reviewed the following data over the past 24 hrs:    N/A  \   N/A   / N/A     138 N/A N/A /  104 (H)   4.2 N/A 1.67 (H) \

## 2023-07-21 NOTE — PROGRESS NOTES
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  He previously has had removal of the infected prosthetic joint, articulated antibiotic spacer placed, long-term antimicrobials and on 7/20/2023 underwent removal of antibiotic spacer with revision of a new prosthetic joint.    Removal of antibiotic spacer with revision of a new of a new prosthetic joint, right total knee on 7/20/2023 with Dr. Dominique  -Defer analgesia, mobility, therapies, antimicrobials, pharmacologic DVT prophylaxis to the primary surgical service.  -ID consulted    Ischemic cardiomyopathy  CAD with drug-eluting stent to the LAD in 2020  Last LVEF 60-65% on exercise stress echo in 2020. Has been off of dual antiplatelet therapy, prasugrel and aspirin for 7 days preoperatively.  -Recommend resuming prasugrel and aspirin ASAP when safe to do so from a postoperative perspective    CKD 3, baseline creatinine 1.6 since May 2023  -discharge IV fluids  Recent Labs   Lab 07/21/23  0713 07/20/23  1053    138   POTASSIUM 4.8 4.2   CHLORIDE 103  --    CO2 20*  --    ANIONGAP 13  --    * 104*   BUN 29.5*  --    CR 1.70* 1.67*   GFRESTIMATED 43* 44*   RYANNE 8.8  --      Hypertension  Hyperlipidemia  -metoprolol  -resume ACE inhibitor    Chronic anemia  Baseline 12 mg/dl range since May 2023 likely secondary to his CKD.  Recent Labs   Lab 07/21/23  0713   HGB 11.1*        Diet: Advance Diet as Tolerated: Regular Diet Adult    DVT Prophylaxis: Ambulate every shift  Tariq Catheter: Not present  Lines: PRESENT           Cardiac Monitoring: None  Code Status: Full Code      Clinically Significant Risk Factors Present on Admission                # Drug Induced  Platelet Defect: home medication list includes an antiplatelet medication   # Hypertension: Noted on problem list      # Overweight: Estimated body mass index is 26.32 kg/m  as calculated from the following:    Height as of this encounter: 1.829 m (6').    Weight as of this encounter: 88 kg (194 lb 1.6 oz).            Disposition Plan      Expected Discharge Date: 07/22/2023           Per primary service       Thais Moreno MD  Hospitalist Service  [facility]  Securely message with [employer] (more info)  Text page via [employer] Paging/Directory   ______________________________________________________________________    Interval History   No complaints.  I am following up on labs today and medications.  These all look good and we can resume the medications previously held.  Afebrile.  Vital signs stable.  Pain controlled.    Physical Exam   Vital Signs: Temp: 98  F (36.7  C) Temp src: Oral BP: 129/73 Pulse: 95   Resp: 20 SpO2: 97 % O2 Device: None (Room air) Oxygen Delivery: 2 LPM  Weight: 194 lbs 1.6 oz    General Appearance: Sitting up in bed, NAD   Respiratory: clear to auscultation bilaterally with good inspiratory effort  Cardiovascular: Regular, no tenderness to palpation of anterior chest wall  GI: Normal bowel sounds  Skin: No jaundice or acute rashes  Neuro/psych: Pleasant and cooperative, no focal deficits, speech normal, no overt thought disorder or confusion    Medical Decision Making       MANAGEMENT DISCUSSED with the following over the past 24 hours: Prior consult notes, primary service notes   NOTE(S)/MEDICAL RECORDS REVIEWED over the past 24 hours: Medication administration record, lab trends, adjustment of medications  Tests ORDERED & REVIEWED in the past 24 hours:  Tests REVIEWED in the past 24 hours:  - See lab/imaging results included in the data section of the note      Data     I have personally reviewed the following data over the past 24 hrs:    N/A  \   11.1 (L)   /  N/A     136 103 29.5 (H) /  179 (H)   4.8 20 (L) 1.70 (H) \

## 2023-07-21 NOTE — CONSULTS
Care Management Initial Consult    General Information  Assessment completed with: Patient, Spouse or significant other, Patient and spouse  Type of CM/SW Visit: Initial Assessment    Primary Care Provider verified and updated as needed: Yes   Readmission within the last 30 days: no previous admission in last 30 days      Reason for Consult: discharge planning  Advance Care Planning: Advance Care Planning Reviewed: verified with patient          Communication Assessment  Patient's communication style: spoken language (English or Bilingual)    Hearing Difficulty or Deaf: no   Wear Glasses or Blind: yes    Cognitive  Cognitive/Neuro/Behavioral: WDL  Level of Consciousness: sedated                   Living Environment:   People in home: spouse  Consuelo  Current living Arrangements: house      Able to return to prior arrangements: yes       Family/Social Support:  Care provided by: self  Provides care for: no one  Marital Status:   Wife  Consuelo       Description of Support System: Supportive, Involved    Support Assessment: Adequate family and caregiver support    Current Resources:   Patient receiving home care services: No     Community Resources: None  Equipment currently used at home: none  Supplies currently used at home: None    Employment/Financial:  Employment Status: employed full-time        Financial Concerns: No concerns identified   Referral to Financial Worker: No       Does the patient's insurance plan have a 3 day qualifying hospital stay waiver?  No    Lifestyle & Psychosocial Needs:  Social Determinants of Health     Tobacco Use: Low Risk  (7/20/2023)    Patient History      Smoking Tobacco Use: Never      Smokeless Tobacco Use: Never      Passive Exposure: Not on file   Alcohol Use: Not on file   Financial Resource Strain: Not on file   Food Insecurity: Not on file   Transportation Needs: Not on file   Physical Activity: Not on file   Stress: Not on file   Social Connections: Not on file    Intimate Partner Violence: Not on file   Depression: Not on file   Housing Stability: Not on file       Functional Status:  Prior to admission patient needed assistance:   Dependent ADLs:: Independent  Dependent IADLs:: Independent       Mental Health Status:  Mental Health Status: No Current Concerns       Chemical Dependency Status:  Chemical Dependency Status: No Current Concerns             Values/Beliefs:  Spiritual, Cultural Beliefs, Faith Practices, Values that affect care: yes       Cultural/Faith Practices Patient Routinely Participates In: prayer       Additional Information:  CM Initial Consult    Bridger Castillo is a 72 year old male who presents for a Preop Evaluation undergoing right total knee arthoplasty revision for treatment of infection of total right knee replacement.    Writer met with the patient in the room with their spouse present.    Patient confirmed the PCP listed in the facesheet.     Patient confirmed the address for their home listed correctly in the facesheet. Patient stated they live in a [facility] with their wife, Consuelo. Patient stated that they were not receiving any HHC or connected to any community resources.     Patient stated that they were independent in all their ADL's. Patient stated that their spouse was their social support.     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 Patient stated that is their only source of income. Patient stated that they have no financial concerns.     Patient stated that they have no concerns with discharging back home.       TRESSA Shipman  [facility]  Social Work

## 2023-07-21 NOTE — ANESTHESIA POSTPROCEDURE EVALUATION
Patient: Bridger Castillo    Procedure: Procedure(s):  Right Total Knee Arthroplasty Revision       Anesthesia Type:  General    Note:  Disposition: Admission   Postop Pain Control: Uneventful            Sign Out: Well controlled pain   PONV: No   Neuro/Psych: Uneventful            Sign Out: Acceptable/Baseline neuro status   Airway/Respiratory: Uneventful            Sign Out: Acceptable/Baseline resp. status   CV/Hemodynamics: Uneventful            Sign Out: Acceptable CV status; No obvious hypovolemia; No obvious fluid overload   Other NRE: NONE   DID A NON-ROUTINE EVENT OCCUR? No           Last vitals:  Vitals Value Taken Time   /85 07/20/23 1845   Temp 36.4  C (97.6  F) 07/20/23 1727   Pulse 87 07/20/23 1847   Resp 15 07/20/23 1847   SpO2 100 % 07/20/23 1848   Vitals shown include unvalidated device data.    Electronically Signed By: Tiffany Navarro MD  July 21, 2023  12:01 AM

## 2023-07-21 NOTE — PROGRESS NOTES
Pt A&Ox4; VSS on RA. CMS intact. Pain managed with current regimen. Tariq was discontinued @ 0500 per order. Pt able to dangle feet @ bedside. Ace wrap dressing on R knee CDI; wound vac in place, no drainage noted this shift. NS running @ 100 mL/hr. Discharge pending

## 2023-07-21 NOTE — PROGRESS NOTES
Occupational Therapy: Orders received. Chart reviewed and discussed with care team.? Occupational Therapy not indicated due to pt not having any IP OT needs.  Pt has AE at home to complete ADL tasks.  Good support from family and friends  IP mobility needs addressed by PT.? Defer discharge recommendations to treatment team.? Will complete orders.

## 2023-07-22 ENCOUNTER — APPOINTMENT (OUTPATIENT)
Dept: PHYSICAL THERAPY | Facility: CLINIC | Age: 73
End: 2023-07-22
Attending: ORTHOPAEDIC SURGERY
Payer: COMMERCIAL

## 2023-07-22 VITALS
HEART RATE: 71 BPM | HEIGHT: 72 IN | RESPIRATION RATE: 16 BRPM | WEIGHT: 194.1 LBS | TEMPERATURE: 97.4 F | DIASTOLIC BLOOD PRESSURE: 65 MMHG | SYSTOLIC BLOOD PRESSURE: 128 MMHG | BODY MASS INDEX: 26.29 KG/M2 | OXYGEN SATURATION: 97 %

## 2023-07-22 LAB — GLUCOSE BLDC GLUCOMTR-MCNC: 103 MG/DL (ref 70–99)

## 2023-07-22 PROCEDURE — 250N000013 HC RX MED GY IP 250 OP 250 PS 637: Performed by: SPECIALIST/TECHNOLOGIST

## 2023-07-22 PROCEDURE — 97530 THERAPEUTIC ACTIVITIES: CPT | Mod: GP

## 2023-07-22 PROCEDURE — 250N000011 HC RX IP 250 OP 636: Mod: JZ | Performed by: SPECIALIST/TECHNOLOGIST

## 2023-07-22 PROCEDURE — 250N000013 HC RX MED GY IP 250 OP 250 PS 637: Performed by: NURSE PRACTITIONER

## 2023-07-22 PROCEDURE — 97116 GAIT TRAINING THERAPY: CPT | Mod: GP

## 2023-07-22 RX ORDER — ASPIRIN 81 MG/1
81 TABLET ORAL DAILY
Qty: 45 TABLET | Refills: 0 | Status: SHIPPED | OUTPATIENT
Start: 2023-07-22 | End: 2023-09-27

## 2023-07-22 RX ORDER — ACETAMINOPHEN 325 MG/1
975 TABLET ORAL EVERY 8 HOURS
Qty: 60 TABLET | Refills: 0 | Status: SHIPPED | OUTPATIENT
Start: 2023-07-22 | End: 2024-03-04

## 2023-07-22 RX ORDER — AMOXICILLIN 250 MG
1 CAPSULE ORAL 2 TIMES DAILY
Qty: 60 TABLET | Refills: 0 | Status: SHIPPED | OUTPATIENT
Start: 2023-07-22 | End: 2024-03-04

## 2023-07-22 RX ORDER — OXYCODONE HYDROCHLORIDE 5 MG/1
5 TABLET ORAL EVERY 4 HOURS PRN
Qty: 33 TABLET | Refills: 0 | Status: SHIPPED | OUTPATIENT
Start: 2023-07-22 | End: 2024-03-04

## 2023-07-22 RX ORDER — DOXYCYCLINE 100 MG/1
100 CAPSULE ORAL EVERY 12 HOURS
Qty: 28 CAPSULE | Refills: 0 | Status: SHIPPED | OUTPATIENT
Start: 2023-07-22 | End: 2024-03-04

## 2023-07-22 RX ADMIN — FAMOTIDINE 20 MG: 20 TABLET ORAL at 08:27

## 2023-07-22 RX ADMIN — METOPROLOL SUCCINATE 25 MG: 25 TABLET, EXTENDED RELEASE ORAL at 08:27

## 2023-07-22 RX ADMIN — ACETAMINOPHEN 975 MG: 325 TABLET, FILM COATED ORAL at 05:18

## 2023-07-22 RX ADMIN — LISINOPRIL 10 MG: 10 TABLET ORAL at 08:27

## 2023-07-22 RX ADMIN — ASPIRIN 81 MG: 81 TABLET, COATED ORAL at 08:27

## 2023-07-22 RX ADMIN — CEFAZOLIN SODIUM 2 G: 2 INJECTION, SOLUTION INTRAVENOUS at 05:19

## 2023-07-22 RX ADMIN — POLYETHYLENE GLYCOL 3350 17 G: 17 POWDER, FOR SOLUTION ORAL at 08:27

## 2023-07-22 RX ADMIN — SENNOSIDES AND DOCUSATE SODIUM 1 TABLET: 50; 8.6 TABLET ORAL at 08:27

## 2023-07-22 ASSESSMENT — ACTIVITIES OF DAILY LIVING (ADL)
ADLS_ACUITY_SCORE: 26

## 2023-07-22 NOTE — PLAN OF CARE
Patient vital signs are at baseline: Yes  Patient able to ambulate as they were prior to admission or with assist devices provided by therapies during their stay:  Yes, crutches. Toe touch weight bearing RLE.  Patient MUST void prior to discharge:  Yes  Patient able to tolerate oral intake:  Yes  Pain has adequate pain control using Oral analgesics:  Yes  Does patient have an identified :  Yes  Has goal D/C date and time been discussed with patient:  Yes    Patient discharged home with portable wound vac connected. Discharge instructions and AVS reviewed with patient who verbalizes understanding. PIV removed. All belongings sent with patient.

## 2023-07-22 NOTE — PLAN OF CARE
Physical Therapy Discharge Summary    Reason for therapy discharge:    All goals and outcomes met, no further needs identified.    Progress towards therapy goal(s). See goals on Care Plan in [facility] electronic health record for goal details.  Goals met    Therapy recommendation(s):    No further therapy is recommended.

## 2023-07-22 NOTE — PROGRESS NOTES
The patient was seen and examined by Dr. Dominique, below represents his examination and plan.    Orthopedic Surgery  Bridger Castillo  7/22/2023  Admit Date:  7/20/2023  POD 2 Days Post-Op  S/P Procedure(s):  Right Total Knee Arthroplasty Revision    Patient is feeling well.  Pain controlled.  Tolerating oral intake.  No events overnight. Discussed pain and recovery expectations with patient.  Discharge instructions reviewed.    Alert and orient to person, place, and time.  Vital Sign Ranges  /67 (BP Location: Left arm, Patient Position: Semi-Edwards's)   Pulse 72   Temp 97.9  F (36.6  C) (Oral)   Resp 16   Ht 1.829 m (6')   Wt 88 kg (194 lb 1.6 oz)   SpO2 95%   BMI 26.32 kg/m    Right knee derssing is clean, dry, and intact. Vac canister is empty.  Minimal erythema of the surrounding skin.  Bilateral calves are soft, non-tender.  right lower extremity is NVI.    Labs:  Hemoglobin   Date Value Ref Range Status   07/21/2023 11.1 (L) 13.3 - 17.7 g/dL Final   05/15/2020 15.5 13.3 - 17.7 g/dL Final     All cultures:  Recent Labs   Lab 07/20/23  1509 07/20/23  1451 07/20/23  1425 07/20/23  1418   CULTURE No anaerobic organisms isolated after 1 day  No growth, less than 1 day No anaerobic organisms isolated after 1 day  No growth, less than 1 day No anaerobic organisms isolated after 1 day  No growth, less than 1 day No anaerobic organisms isolated after 1 day  No growth, less than 1 day       A/P  1. S/p revision right TKA   Continue aspirin daily 81mg + PTA prasurgel for DVT prophylaxis.     Mobilize with PT/OT TDWB.     Continue current pain regimen   ID consult, appreciate input, will likely plan on augmentin + doxy x 2 weeks   Leave dressing undisturbed   Hinged knee brace locked in extension when up    2. Disposition   Anticipate d/c to home today    Kjerstin L Foss, PA-C

## 2023-07-22 NOTE — PROGRESS NOTES
"Dr. Sawant paged via [facility]-  \"Pt discharging- wondering if he needs to wait until after taking his 1:30 dose of Ancef or if he's okay to leave before. D/c on Augmentin. \"    Received call back- no need to stay at hospital for remaining dose of Ancef. Okay for patient to discharge.   "

## 2023-07-22 NOTE — PLAN OF CARE
Goal Outcome Evaluation:    Patient vital signs are at baseline: Yes, on RA  Patient able to ambulate as they were prior to admission or with assist devices provided by therapies during their stay:  Yes, SBA with crutches. TTWB  Patient MUST void prior to discharge:  Yes, uses bedside urinal  Patient able to tolerate oral intake:  Yes  Pain has adequate pain control using Oral analgesics:  Yes, schedule tylenol given  Does patient have an identified :  Yes  Has goal D/C date and time been discussed with patient:  Yes    A&Ox4. CMS intact. Wound vac in place & intact. Will continue to monitor.

## 2023-07-25 LAB
BACTERIA SNV CULT: NO GROWTH
BACTERIA TISS BX CULT: NO GROWTH

## 2023-07-27 LAB
BACTERIA TISS BX CULT: NORMAL

## 2023-08-03 LAB — BACTERIA SNV CULT: NORMAL

## 2023-09-27 DIAGNOSIS — E78.5 HYPERLIPIDEMIA LDL GOAL <70: ICD-10-CM

## 2023-09-27 DIAGNOSIS — Z96.651 S/P REVISION OF TOTAL KNEE, RIGHT: ICD-10-CM

## 2023-09-27 RX ORDER — ASPIRIN 81 MG/1
81 TABLET ORAL DAILY
Qty: 90 TABLET | Refills: 1 | Status: SHIPPED | OUTPATIENT
Start: 2023-09-27

## 2023-09-27 RX ORDER — ROSUVASTATIN CALCIUM 40 MG/1
40 TABLET, COATED ORAL DAILY
Qty: 90 TABLET | Refills: 1 | Status: SHIPPED | OUTPATIENT
Start: 2023-09-27 | End: 2024-05-13

## 2023-10-02 DIAGNOSIS — I10 HTN (HYPERTENSION): Primary | ICD-10-CM

## 2023-10-02 DIAGNOSIS — I21.3 ST ELEVATION MYOCARDIAL INFARCTION (STEMI), UNSPECIFIED ARTERY (H): ICD-10-CM

## 2023-10-02 RX ORDER — LISINOPRIL 20 MG/1
10 TABLET ORAL DAILY
Qty: 45 TABLET | Refills: 0 | Status: SHIPPED | OUTPATIENT
Start: 2023-10-02 | End: 2023-12-14

## 2023-10-02 RX ORDER — EZETIMIBE 10 MG/1
10 TABLET ORAL EVERY EVENING
Qty: 90 TABLET | Refills: 0 | Status: SHIPPED | OUTPATIENT
Start: 2023-10-02 | End: 2024-01-25

## 2023-12-06 DIAGNOSIS — I25.10 CORONARY ARTERY DISEASE INVOLVING NATIVE CORONARY ARTERY OF NATIVE HEART WITHOUT ANGINA PECTORIS: ICD-10-CM

## 2023-12-06 RX ORDER — METOPROLOL SUCCINATE 25 MG/1
25 TABLET, EXTENDED RELEASE ORAL DAILY
Qty: 90 TABLET | Refills: 0 | Status: SHIPPED | OUTPATIENT
Start: 2023-12-06 | End: 2024-03-11

## 2023-12-14 DIAGNOSIS — I10 HTN (HYPERTENSION): ICD-10-CM

## 2023-12-14 RX ORDER — LISINOPRIL 20 MG/1
10 TABLET ORAL DAILY
Qty: 45 TABLET | Refills: 0 | Status: SHIPPED | OUTPATIENT
Start: 2023-12-14 | End: 2024-03-14

## 2024-01-24 DIAGNOSIS — I21.3 ST ELEVATION MYOCARDIAL INFARCTION (STEMI), UNSPECIFIED ARTERY (H): Primary | ICD-10-CM

## 2024-01-25 DIAGNOSIS — I21.3 ST ELEVATION MYOCARDIAL INFARCTION (STEMI), UNSPECIFIED ARTERY (H): ICD-10-CM

## 2024-01-25 RX ORDER — EZETIMIBE 10 MG/1
10 TABLET ORAL EVERY EVENING
Qty: 90 TABLET | Refills: 0 | Status: SHIPPED | OUTPATIENT
Start: 2024-01-25 | End: 2024-05-02

## 2024-01-29 ENCOUNTER — HOSPITAL ENCOUNTER (OUTPATIENT)
Dept: CARDIOLOGY | Facility: CLINIC | Age: 74
Discharge: HOME | End: 2024-01-29
Attending: INTERNAL MEDICINE | Admitting: INTERNAL MEDICINE
Payer: COMMERCIAL

## 2024-01-29 DIAGNOSIS — I21.3 ST ELEVATION MYOCARDIAL INFARCTION (STEMI), UNSPECIFIED ARTERY (H): ICD-10-CM

## 2024-01-29 PROCEDURE — 255N000002 HC RX 255 OP 636: Performed by: INTERNAL MEDICINE

## 2024-01-29 PROCEDURE — 93018 CV STRESS TEST I&R ONLY: CPT | Performed by: INTERNAL MEDICINE

## 2024-01-29 PROCEDURE — 93350 STRESS TTE ONLY: CPT | Mod: 26 | Performed by: INTERNAL MEDICINE

## 2024-01-29 PROCEDURE — 93325 DOPPLER ECHO COLOR FLOW MAPG: CPT | Mod: 26 | Performed by: INTERNAL MEDICINE

## 2024-01-29 PROCEDURE — 93321 DOPPLER ECHO F-UP/LMTD STD: CPT | Mod: 26 | Performed by: INTERNAL MEDICINE

## 2024-01-29 PROCEDURE — 93016 CV STRESS TEST SUPVJ ONLY: CPT | Performed by: INTERNAL MEDICINE

## 2024-01-29 PROCEDURE — 999N000208 ECHO STRESS ECHOCARDIOGRAM

## 2024-01-29 RX ADMIN — PERFLUTREN 5 ML: 6.52 INJECTION, SUSPENSION INTRAVENOUS at 10:57

## 2024-03-04 ENCOUNTER — OFFICE VISIT (OUTPATIENT)
Dept: CARDIOLOGY | Facility: CLINIC | Age: 74
End: 2024-03-04
Payer: COMMERCIAL

## 2024-03-04 VITALS
HEART RATE: 58 BPM | HEIGHT: 72 IN | BODY MASS INDEX: 28.15 KG/M2 | SYSTOLIC BLOOD PRESSURE: 138 MMHG | WEIGHT: 207.8 LBS | DIASTOLIC BLOOD PRESSURE: 77 MMHG | OXYGEN SATURATION: 97 %

## 2024-03-04 DIAGNOSIS — E78.5 HYPERLIPIDEMIA LDL GOAL <70: ICD-10-CM

## 2024-03-04 DIAGNOSIS — I25.10 CORONARY ARTERY DISEASE INVOLVING NATIVE CORONARY ARTERY OF NATIVE HEART WITHOUT ANGINA PECTORIS: ICD-10-CM

## 2024-03-04 DIAGNOSIS — I21.3 ST ELEVATION MYOCARDIAL INFARCTION (STEMI), UNSPECIFIED ARTERY (H): Primary | ICD-10-CM

## 2024-03-04 PROCEDURE — 99214 OFFICE O/P EST MOD 30 MIN: CPT | Performed by: INTERNAL MEDICINE

## 2024-03-04 NOTE — LETTER
3/4/2024    GAURAV NEUMANN MD  [facility] [address]  [place] [address]    RE: Bridger Castillo       Dear Colleague,     I had the pleasure of seeing Bridger Castillo in the [facility].  HPI and Plan:     Sunny Castillo is a pleasant 72 y/o male here for annual follow up.  History of hypertension, CAD, STEMI 2001, JNENIFER to RCA, LAD 2020.      Infected knee 4/23.  Replaced in July 2023.  Some venous insufficiency in right LE.  Some swelling there wearing compression stocking.     Stress echocardiogram `1/29/24 Recumbent bicycle stress at [facility] negative.  Images reviewed.     Cholesteral checked by Dr. Neumann and has been good.  No issues with medications.     Slight swellilng right LE.     Today's clinic visit entailed:  Review of the result(s) of each unique test - stress echocardiogram, labs  The following tests were independently interpreted by me as noted in my documentation: stress echocardiogram  Ordering of each unique test  Prescription drug management  30 minutes spent by me on the date of the encounter doing chart review, history and exam, documentation and further activities per the note  Provider  Link to [facility] Help Grid     The level of medical decision making during this visit was of moderate complexity.      No orders of the defined types were placed in this encounter.    No orders of the defined types were placed in this encounter.    Medications Discontinued During This Encounter   Medication Reason    acetaminophen (TYLENOL) 325 MG tablet Med Rec(No AVS / No eCancel)    senna-docusate (SENOKOT-S/PERICOLACE) 8.6-50 MG tablet Med Rec(No AVS / No eCancel)         Encounter Diagnoses   Name Primary?    ST elevation myocardial infarction (STEMI), unspecified artery (H) Yes    Coronary artery disease involving native coronary artery of native heart without angina pectoris     Hyperlipidemia LDL goal <70        CURRENT MEDICATIONS:  Current Outpatient Medications   Medication Sig  Dispense Refill    aspirin 81 MG EC tablet Take 1 tablet (81 mg) by mouth daily 90 tablet 1    B Complex Vitamins (B COMPLEX PO) Take 1 tablet by mouth daily      Cholecalciferol (VITAMIN D) 2000 UNITS CAPS Take 1 capsule by mouth daily      cinnamon 500 MG CAPS Take 1 capsule by mouth daily      ezetimibe (ZETIA) 10 MG tablet Take 1 tablet (10 mg) by mouth every evening 90 tablet 0    lisinopril (ZESTRIL) 20 MG tablet Take 0.5 tablets (10 mg) by mouth daily (0.5 x 20 mg = 10 mg) 45 tablet 0    metoprolol succinate ER (TOPROL XL) 25 MG 24 hr tablet Take 1 tablet (25 mg) by mouth daily 90 tablet 0    nitroGLYcerin (NITROSTAT) 0.4 MG sublingual tablet Place 1 tablet (0.4 mg) under the tongue every 5 minutes as needed for chest pain 25 tablet 11    prasugrel (EFFIENT) 10 MG TABS tablet Take 1 tablet (10 mg) by mouth daily 90 tablet 2    rosuvastatin (CRESTOR) 40 MG tablet Take 1 tablet (40 mg) by mouth daily 90 tablet 1    amoxicillin-clavulanate (AUGMENTIN) 875-125 MG tablet Take 1 tablet by mouth every 12 hours 28 tablet 0    diphenhydrAMINE-acetaminophen (TYLENOL PM)  MG tablet Take 1 tablet by mouth At Bedtime      doxycycline hyclate (VIBRAMYCIN) 100 MG capsule Take 1 capsule (100 mg) by mouth every 12 hours 28 capsule 0    oxyCODONE (ROXICODONE) 5 MG tablet Take 1 tablet (5 mg) by mouth every 4 hours as needed for moderate pain 33 tablet 0       ALLERGIES   No Known Allergies    PAST MEDICAL HISTORY:  Past Medical History:   Diagnosis Date    Arthritis     CKD (chronic kidney disease) stage 3, GFR 30-59 ml/min (H)     Degenerative joint disease     Hyperlipemia     Hypertension     Ischemic cardiomyopathy     Left carpal tunnel syndrome     Myocardial infarction (H)     2011 STEMI    Post PTCA     JENNIFER to RCA 2011/// 2nd stent  2020 different artery       PAST SURGICAL HISTORY:  Past Surgical History:   Procedure Laterality Date    ARTHROPLASTY REVISION KNEE Right 4/17/2023    Procedure: Right Total Knee  Arthroplasty Removal, Replacement with Antibiotic Spacer;  Surgeon: Anseth, Scott Duane, MD;  Location:  OR    ARTHROPLASTY REVISION KNEE Right 7/20/2023    Procedure: Right Total Knee Arthroplasty Revision;  Surgeon: Anseth, Scott Duane, MD;  Location:  OR    COLONOSCOPY N/A 7/16/2020    Procedure: COLONOSCOPY, WITH POLYPECTOMY AND BIOPSY;  Surgeon: Luis Beach MD;  Location:  GI    CV CORONARY ANGIOGRAM N/A 5/15/2020    Procedure: Coronary Angiogram;  Surgeon: August Hartmann MD;  Location:  HEART CARDIAC CATH LAB    CV PCI STENT DRUG ELUTING N/A 5/15/2020    Procedure: Percutaneous Coronary Intervention Stent Drug Eluting;  Surgeon: August Hartmann MD;  Location:  HEART CARDIAC CATH LAB    HEART CATH, ANGIOPLASTY      JENNIFER RCA 2011, 70% stenosis LAD    ORTHOPEDIC SURGERY      right knee replacement       FAMILY HISTORY:  Family History   Problem Relation Age of Onset    Hypertension Brother     Hypertension Father     Cancer - colorectal Father     Cancer Mother         lung    Other - See Comments Brother         heamachromatosis    Coronary Artery Disease Brother 61        MI       SOCIAL HISTORY:  Social History     Socioeconomic History    Marital status:      Spouse name: None    Number of children: None    Years of education: None    Highest education level: None   Tobacco Use    Smoking status: Never    Smokeless tobacco: Never   Substance and Sexual Activity    Alcohol use: Never     Alcohol/week: 1.0 standard drink of alcohol     Types: 1 Cans of beer per week    Drug use: Never   Other Topics Concern    Caffeine Concern Yes     Comment: ice tea 16oz    Special Diet No    Exercise Yes     Comment: biking, 10,000 steps daily     Seat Belt Yes    Parent/sibling w/ CABG, MI or angioplasty before 65F 55M? No       Review of Systems:  Skin:        Eyes:       ENT:       Respiratory:  Negative    Cardiovascular:    Positive for;edema  Gastroenterology:       Genitourinary:       Musculoskeletal:       Neurologic:       Psychiatric:       Heme/Lymph/Imm:  Negative    Endocrine:  Negative      Physical Exam:  Vitals: /77   Pulse 58   Ht 1.829 m (6')   Wt 94.3 kg (207 lb 12.8 oz)   SpO2 97%   BMI 28.18 kg/m      Constitutional:           Skin:             Head:           Eyes:           Lymph:      ENT:           Neck:           Respiratory:            Cardiac:                                                           GI:           Extremities and Muscular Skeletal:                 Neurological:           Psych:         Recent Lab Results:  LIPID RESULTS:  Lab Results   Component Value Date    CHOL 147 05/29/2019    HDL 49 05/29/2019    LDL 71 05/29/2019    TRIG 136 05/29/2019    CHOLHDLRATIO 2.7 03/27/2015       LIVER ENZYME RESULTS:  Lab Results   Component Value Date    AST 47 04/25/2023    AST 28 10/21/2014    ALT 16 05/29/2019       CBC RESULTS:  Lab Results   Component Value Date    WBC 6.7 05/29/2023    WBC 6.1 05/15/2020    RBC 4.10 (L) 05/29/2023    RBC 4.72 05/15/2020    HGB 11.1 (L) 07/21/2023    HGB 15.5 05/15/2020    HCT 40.5 05/29/2023    HCT 45.7 05/15/2020    MCV 99 05/29/2023    MCV 97 05/15/2020    MCH 31.5 05/29/2023    MCH 32.8 05/15/2020    MCHC 31.9 05/29/2023    MCHC 33.9 05/15/2020    RDW 12.5 05/29/2023    RDW 12.3 05/15/2020     05/29/2023     05/15/2020       BMP RESULTS:  Lab Results   Component Value Date     07/21/2023     05/15/2020    POTASSIUM 4.8 07/21/2023    POTASSIUM 4.2 05/15/2020    CHLORIDE 103 07/21/2023    CHLORIDE 105 05/15/2020    CO2 20 (L) 07/21/2023    CO2 26 05/15/2020    ANIONGAP 13 07/21/2023    ANIONGAP 6 05/15/2020     (H) 07/22/2023     (H) 05/15/2020    BUN 29.5 (H) 07/21/2023    BUN 27 05/15/2020    CR 1.70 (H) 07/21/2023    CR 1.46 (H) 05/15/2020    GFRESTIMATED 43 (L) 07/21/2023    GFRESTIMATED 49 (L) 05/15/2020    GFRESTBLACK 57 (L) 05/15/2020    RYANNE 8.8  "07/21/2023    RYANNE 9.6 05/15/2020        A1C RESULTS:  No results found for: \"A1C\"    INR RESULTS:  Lab Results   Component Value Date    INR 1.02 05/15/2020    INR 0.85 (L) 12/19/2011           CC  No referring provider defined for this encounter.        Thank you for allowing me to participate in the care of your patient.      Sincerely,     HANSEL ALVAREZ MD     [facility]  "

## 2024-03-04 NOTE — PROGRESS NOTES
HPI and Plan:     Sunny Castillo is a pleasant 74 y/o male here for annual follow up.  History of hypertension, CAD, STEMI 2001, JENNIFER to RCA, LAD 2020.      Infected knee 4/23.  Replaced in July 2023.  Some venous insufficiency in right LE.  Some swelling there wearing compression stocking.     Stress echocardiogram `1/29/24 Recumbent bicycle stress at [facility] negative.  Images reviewed.     Cholesteral checked by Dr. Neumann and has been good.  No issues with medications.     Slight swellilng right LE.     Today's clinic visit entailed:  Review of the result(s) of each unique test - stress echocardiogram, labs  The following tests were independently interpreted by me as noted in my documentation: stress echocardiogram  Ordering of each unique test  Prescription drug management  30 minutes spent by me on the date of the encounter doing chart review, history and exam, documentation and further activities per the note  Provider  Link to [facility] Help Grid     The level of medical decision making during this visit was of moderate complexity.      No orders of the defined types were placed in this encounter.    No orders of the defined types were placed in this encounter.    Medications Discontinued During This Encounter   Medication Reason    acetaminophen (TYLENOL) 325 MG tablet Med Rec(No AVS / No eCancel)    senna-docusate (SENOKOT-S/PERICOLACE) 8.6-50 MG tablet Med Rec(No AVS / No eCancel)         Encounter Diagnoses   Name Primary?    ST elevation myocardial infarction (STEMI), unspecified artery (H) Yes    Coronary artery disease involving native coronary artery of native heart without angina pectoris     Hyperlipidemia LDL goal <70        CURRENT MEDICATIONS:  Current Outpatient Medications   Medication Sig Dispense Refill    aspirin 81 MG EC tablet Take 1 tablet (81 mg) by mouth daily 90 tablet 1    B Complex Vitamins (B COMPLEX PO) Take 1 tablet by mouth daily      Cholecalciferol (VITAMIN D) 2000 UNITS CAPS Take 1 capsule  by mouth daily      cinnamon 500 MG CAPS Take 1 capsule by mouth daily      ezetimibe (ZETIA) 10 MG tablet Take 1 tablet (10 mg) by mouth every evening 90 tablet 0    lisinopril (ZESTRIL) 20 MG tablet Take 0.5 tablets (10 mg) by mouth daily (0.5 x 20 mg = 10 mg) 45 tablet 0    metoprolol succinate ER (TOPROL XL) 25 MG 24 hr tablet Take 1 tablet (25 mg) by mouth daily 90 tablet 0    nitroGLYcerin (NITROSTAT) 0.4 MG sublingual tablet Place 1 tablet (0.4 mg) under the tongue every 5 minutes as needed for chest pain 25 tablet 11    prasugrel (EFFIENT) 10 MG TABS tablet Take 1 tablet (10 mg) by mouth daily 90 tablet 2    rosuvastatin (CRESTOR) 40 MG tablet Take 1 tablet (40 mg) by mouth daily 90 tablet 1    amoxicillin-clavulanate (AUGMENTIN) 875-125 MG tablet Take 1 tablet by mouth every 12 hours 28 tablet 0    diphenhydrAMINE-acetaminophen (TYLENOL PM)  MG tablet Take 1 tablet by mouth At Bedtime      doxycycline hyclate (VIBRAMYCIN) 100 MG capsule Take 1 capsule (100 mg) by mouth every 12 hours 28 capsule 0    oxyCODONE (ROXICODONE) 5 MG tablet Take 1 tablet (5 mg) by mouth every 4 hours as needed for moderate pain 33 tablet 0       ALLERGIES   No Known Allergies    PAST MEDICAL HISTORY:  Past Medical History:   Diagnosis Date    Arthritis     CKD (chronic kidney disease) stage 3, GFR 30-59 ml/min (H)     Degenerative joint disease     Hyperlipemia     Hypertension     Ischemic cardiomyopathy     Left carpal tunnel syndrome     Myocardial infarction (H)     2011 STEMI    Post PTCA     JENNIFER to RCA 2011/// 2nd stent  2020 different artery       PAST SURGICAL HISTORY:  Past Surgical History:   Procedure Laterality Date    ARTHROPLASTY REVISION KNEE Right 4/17/2023    Procedure: Right Total Knee Arthroplasty Removal, Replacement with Antibiotic Spacer;  Surgeon: Anseth, Scott Duane, MD;  Location: [facility] OR    ARTHROPLASTY REVISION KNEE Right 7/20/2023    Procedure: Right Total Knee Arthroplasty Revision;  Surgeon:  Anseth, Scott Duane, MD;  Location:  OR    COLONOSCOPY N/A 7/16/2020    Procedure: COLONOSCOPY, WITH POLYPECTOMY AND BIOPSY;  Surgeon: Luis Beach MD;  Location:  GI    CV CORONARY ANGIOGRAM N/A 5/15/2020    Procedure: Coronary Angiogram;  Surgeon: August Hartmann MD;  Location:  HEART CARDIAC CATH LAB    CV PCI STENT DRUG ELUTING N/A 5/15/2020    Procedure: Percutaneous Coronary Intervention Stent Drug Eluting;  Surgeon: August Hartmann MD;  Location:  HEART CARDIAC CATH LAB    HEART CATH, ANGIOPLASTY      JENNIFER RCA 2011, 70% stenosis LAD    ORTHOPEDIC SURGERY      right knee replacement       FAMILY HISTORY:  Family History   Problem Relation Age of Onset    Hypertension Brother     Hypertension Father     Cancer - colorectal Father     Cancer Mother         lung    Other - See Comments Brother         heamachromatosis    Coronary Artery Disease Brother 61        MI       SOCIAL HISTORY:  Social History     Socioeconomic History    Marital status:      Spouse name: None    Number of children: None    Years of education: None    Highest education level: None   Tobacco Use    Smoking status: Never    Smokeless tobacco: Never   Substance and Sexual Activity    Alcohol use: Never     Alcohol/week: 1.0 standard drink of alcohol     Types: 1 Cans of beer per week    Drug use: Never   Other Topics Concern    Caffeine Concern Yes     Comment: ice tea 16oz    Special Diet No    Exercise Yes     Comment: biking, 10,000 steps daily     Seat Belt Yes    Parent/sibling w/ CABG, MI or angioplasty before 65F 55M? No       Review of Systems:  Skin:        Eyes:       ENT:       Respiratory:  Negative    Cardiovascular:    Positive for;edema  Gastroenterology:      Genitourinary:       Musculoskeletal:       Neurologic:       Psychiatric:       Heme/Lymph/Imm:  Negative    Endocrine:  Negative      Physical Exam:  Vitals: /77   Pulse 58   Ht 1.829 m (6')   Wt 94.3 kg (207 lb 12.8  "oz)   SpO2 97%   BMI 28.18 kg/m      Constitutional:           Skin:             Head:           Eyes:           Lymph:      ENT:           Neck:           Respiratory:            Cardiac:                                                           GI:           Extremities and Muscular Skeletal:                 Neurological:           Psych:         Recent Lab Results:  LIPID RESULTS:  Lab Results   Component Value Date    CHOL 147 05/29/2019    HDL 49 05/29/2019    LDL 71 05/29/2019    TRIG 136 05/29/2019    CHOLHDLRATIO 2.7 03/27/2015       LIVER ENZYME RESULTS:  Lab Results   Component Value Date    AST 47 04/25/2023    AST 28 10/21/2014    ALT 16 05/29/2019       CBC RESULTS:  Lab Results   Component Value Date    WBC 6.7 05/29/2023    WBC 6.1 05/15/2020    RBC 4.10 (L) 05/29/2023    RBC 4.72 05/15/2020    HGB 11.1 (L) 07/21/2023    HGB 15.5 05/15/2020    HCT 40.5 05/29/2023    HCT 45.7 05/15/2020    MCV 99 05/29/2023    MCV 97 05/15/2020    MCH 31.5 05/29/2023    MCH 32.8 05/15/2020    MCHC 31.9 05/29/2023    MCHC 33.9 05/15/2020    RDW 12.5 05/29/2023    RDW 12.3 05/15/2020     05/29/2023     05/15/2020       BMP RESULTS:  Lab Results   Component Value Date     07/21/2023     05/15/2020    POTASSIUM 4.8 07/21/2023    POTASSIUM 4.2 05/15/2020    CHLORIDE 103 07/21/2023    CHLORIDE 105 05/15/2020    CO2 20 (L) 07/21/2023    CO2 26 05/15/2020    ANIONGAP 13 07/21/2023    ANIONGAP 6 05/15/2020     (H) 07/22/2023     (H) 05/15/2020    BUN 29.5 (H) 07/21/2023    BUN 27 05/15/2020    CR 1.70 (H) 07/21/2023    CR 1.46 (H) 05/15/2020    GFRESTIMATED 43 (L) 07/21/2023    GFRESTIMATED 49 (L) 05/15/2020    GFRESTBLACK 57 (L) 05/15/2020    RYANNE 8.8 07/21/2023    RYANNE 9.6 05/15/2020        A1C RESULTS:  No results found for: \"A1C\"    INR RESULTS:  Lab Results   Component Value Date    INR 1.02 05/15/2020    INR 0.85 (L) 12/19/2011           CC  No referring provider defined for this " encounter.

## 2024-03-11 DIAGNOSIS — I25.10 CORONARY ARTERY DISEASE INVOLVING NATIVE CORONARY ARTERY OF NATIVE HEART WITHOUT ANGINA PECTORIS: ICD-10-CM

## 2024-03-11 DIAGNOSIS — I21.3 ST ELEVATION MYOCARDIAL INFARCTION (STEMI), UNSPECIFIED ARTERY (H): ICD-10-CM

## 2024-03-11 RX ORDER — METOPROLOL SUCCINATE 25 MG/1
25 TABLET, EXTENDED RELEASE ORAL DAILY
Qty: 90 TABLET | Refills: 4 | Status: SHIPPED | OUTPATIENT
Start: 2024-03-11

## 2024-03-11 RX ORDER — PRASUGREL 10 MG/1
10 TABLET, FILM COATED ORAL DAILY
Qty: 90 TABLET | Refills: 4 | Status: SHIPPED | OUTPATIENT
Start: 2024-03-11

## 2024-03-14 DIAGNOSIS — I10 HTN (HYPERTENSION): ICD-10-CM

## 2024-03-14 RX ORDER — LISINOPRIL 20 MG/1
10 TABLET ORAL DAILY
Qty: 45 TABLET | Refills: 4 | Status: SHIPPED | OUTPATIENT
Start: 2024-03-14 | End: 2024-09-12

## 2024-05-02 DIAGNOSIS — I21.3 ST ELEVATION MYOCARDIAL INFARCTION (STEMI), UNSPECIFIED ARTERY (H): ICD-10-CM

## 2024-05-02 RX ORDER — EZETIMIBE 10 MG/1
10 TABLET ORAL EVERY EVENING
Qty: 90 TABLET | Refills: 3 | Status: SHIPPED | OUTPATIENT
Start: 2024-05-02 | End: 2024-05-16

## 2024-05-02 NOTE — TELEPHONE ENCOUNTER
Received refill request for:  [facility] Cardiology Refill Guideline reviewed.  Medication meets criteria for refill.    Sun Rhoades RN, BSN  05/02/24 at 2:35 PM

## 2024-05-13 DIAGNOSIS — E78.5 HYPERLIPIDEMIA LDL GOAL <70: ICD-10-CM

## 2024-05-13 RX ORDER — ROSUVASTATIN CALCIUM 40 MG/1
40 TABLET, COATED ORAL DAILY
Qty: 90 TABLET | Refills: 3 | Status: SHIPPED | OUTPATIENT
Start: 2024-05-13

## 2024-05-16 DIAGNOSIS — I21.3 ST ELEVATION MYOCARDIAL INFARCTION (STEMI), UNSPECIFIED ARTERY (H): ICD-10-CM

## 2024-05-16 RX ORDER — EZETIMIBE 10 MG/1
10 TABLET ORAL EVERY EVENING
Qty: 90 TABLET | Refills: 4 | Status: SHIPPED | OUTPATIENT
Start: 2024-05-16

## 2024-09-12 DIAGNOSIS — I10 HTN (HYPERTENSION): ICD-10-CM

## 2024-09-12 RX ORDER — LISINOPRIL 20 MG/1
10 TABLET ORAL DAILY
Qty: 45 TABLET | Refills: 2 | Status: SHIPPED | OUTPATIENT
Start: 2024-09-12

## 2024-10-21 NOTE — PROGRESS NOTES
[facility]  Consult Note - Hospitalist Service      Date of Admission:  4/17/2023  Consult Requested by: Orthopedics        Assessment & Plan  Bridger Castillo is a 72 year old male with PMH significant for hypertension, dyslipidemia, CAD with prior MI and stent placement, CKD stage III, ischemic cardiomyopathy, prior right knee replacement in 2014 who was admitted to [facility] on 4/17/2023 by the orthopedic service for total right knee arthroplasty removal, replacement with antibiotic spacer for INFECTED right TKA with Dr. Dominique.  No immediate postoperative complications, EBL25 mL. Preoperative H&P reviewed.     Given the above, will defer formal consult. Routine post-operative cares, IVF, DVT prophylaxis, and pain management to orthopedic service.  -- Labs reviewed this morning  Lab Results   Component Value Date    WBC 13.1 (H) 04/18/2023    HGB 11.4 (L) 04/18/2023    HCT 35.0 (L) 04/18/2023     04/18/2023     (L) 04/18/2023    POTASSIUM 4.6 04/18/2023    CHLORIDE 98 04/18/2023    CO2 22 04/18/2023    BUN 21.3 04/18/2023    CR 1.35 (H) 04/18/2023     (H) 04/18/2023     (H) 04/18/2023    SED 48 (H) 04/17/2023   - Na 132, Cr 1.35 (baseline)  - Ok for discharge from medicine standpoint when approved by orthopedic surgery.  - PT and OT to assess per Ortho.   - Bowel regimen in place while on pain regimen.   --No changes to PTA medication regimen at discharge unless issues arise throughout stay.       No charge note.    Amilcar Beatty PA-C  [facility] Hospitalist Service   Detail Level: Detailed Hide Additional Notes?: No

## 2025-03-02 ENCOUNTER — HEALTH MAINTENANCE LETTER (OUTPATIENT)
Age: 75
End: 2025-03-02

## 2025-03-31 ENCOUNTER — MYC REFILL (OUTPATIENT)
Dept: CARDIOLOGY | Facility: CLINIC | Age: 75
End: 2025-03-31
Payer: COMMERCIAL

## 2025-03-31 DIAGNOSIS — E78.5 HYPERLIPIDEMIA LDL GOAL <70: ICD-10-CM

## 2025-03-31 DIAGNOSIS — I21.3 ST ELEVATION MYOCARDIAL INFARCTION (STEMI), UNSPECIFIED ARTERY (H): ICD-10-CM

## 2025-03-31 DIAGNOSIS — I25.10 CORONARY ARTERY DISEASE INVOLVING NATIVE CORONARY ARTERY OF NATIVE HEART WITHOUT ANGINA PECTORIS: ICD-10-CM

## 2025-03-31 DIAGNOSIS — I10 HTN (HYPERTENSION): ICD-10-CM

## 2025-03-31 RX ORDER — LISINOPRIL 20 MG/1
10 TABLET ORAL DAILY
Qty: 45 TABLET | Refills: 0 | Status: SHIPPED | OUTPATIENT
Start: 2025-03-31 | End: 2025-04-01

## 2025-03-31 RX ORDER — EZETIMIBE 10 MG/1
10 TABLET ORAL EVERY EVENING
Qty: 90 TABLET | Refills: 0 | Status: SHIPPED | OUTPATIENT
Start: 2025-03-31

## 2025-03-31 RX ORDER — ROSUVASTATIN CALCIUM 40 MG/1
40 TABLET, COATED ORAL DAILY
Qty: 90 TABLET | Refills: 0 | Status: SHIPPED | OUTPATIENT
Start: 2025-03-31

## 2025-03-31 RX ORDER — METOPROLOL SUCCINATE 25 MG/1
25 TABLET, EXTENDED RELEASE ORAL DAILY
Qty: 90 TABLET | Refills: 0 | Status: SHIPPED | OUTPATIENT
Start: 2025-03-31

## 2025-03-31 RX ORDER — PRASUGREL 10 MG/1
10 TABLET, FILM COATED ORAL DAILY
Qty: 90 TABLET | Refills: 0 | Status: SHIPPED | OUTPATIENT
Start: 2025-03-31 | End: 2025-04-01

## 2025-04-01 ENCOUNTER — OFFICE VISIT (OUTPATIENT)
Dept: CARDIOLOGY | Facility: CLINIC | Age: 75
End: 2025-04-01
Payer: MEDICARE

## 2025-04-01 VITALS
WEIGHT: 209 LBS | BODY MASS INDEX: 28.31 KG/M2 | HEIGHT: 72 IN | HEART RATE: 56 BPM | SYSTOLIC BLOOD PRESSURE: 120 MMHG | DIASTOLIC BLOOD PRESSURE: 64 MMHG

## 2025-04-01 DIAGNOSIS — I15.9 SECONDARY HYPERTENSION: Primary | ICD-10-CM

## 2025-04-01 PROCEDURE — 3074F SYST BP LT 130 MM HG: CPT | Performed by: PHYSICIAN ASSISTANT

## 2025-04-01 PROCEDURE — 99215 OFFICE O/P EST HI 40 MIN: CPT | Performed by: PHYSICIAN ASSISTANT

## 2025-04-01 PROCEDURE — 3078F DIAST BP <80 MM HG: CPT | Performed by: PHYSICIAN ASSISTANT

## 2025-04-01 PROCEDURE — G2211 COMPLEX E/M VISIT ADD ON: HCPCS | Performed by: PHYSICIAN ASSISTANT

## 2025-04-01 RX ORDER — PRASUGREL 10 MG/1
TABLET, FILM COATED ORAL DAILY
COMMUNITY

## 2025-04-01 RX ORDER — PREGABALIN 75 MG/1
CAPSULE ORAL
COMMUNITY
Start: 2025-03-18

## 2025-04-01 RX ORDER — METHYLPREDNISOLONE 4 MG/1
4 TABLET ORAL DAILY
COMMUNITY

## 2025-04-01 RX ORDER — LISINOPRIL 10 MG/1
10 TABLET ORAL DAILY
Qty: 90 TABLET | Refills: 3 | Status: SHIPPED | OUTPATIENT
Start: 2025-04-01

## 2025-04-01 RX ORDER — ACETAMINOPHEN 500 MG
500-1000 TABLET ORAL EVERY 6 HOURS PRN
COMMUNITY

## 2025-04-01 NOTE — PROGRESS NOTES
Primary Cardiologist: Dr. Hartmann    Reason for Visit: Annual follow up    History of Present Illness:   Bridger is a very pleasant 74-year-old male with past medical history notable for CAD (remote history of STEMI back in 2001 and s/p aspiration atherectomy and JENNIFER to RCA and JENNIFER to proximal-mid LAD in 2020), hypertension, venous insufficiency, hyperlipidemia, and CKD.    He returns to clinic today stating he is doing well.  He denies exertional chest discomfort, shortness of breath, orthopnea, PND, or peripheral edema.  He is hoping to undergo back surgery later this spring.  He is seeing specialist regarding this.  He has been on Effient and aspirin long-term.  He has no history of stroke or TIA.  His cholesterol numbers are little higher this year but he tells me he has not been very active due to his back issues.  He is already on maximum dose of rosuvastatin as well as ezetimibe.    Assessment and Plan:  Bridger is a very pleasant 74-year-old male with past medical history notable for CAD (remote history of STEMI back in 2001 and s/p aspiration atherectomy and JENNIFER to RCA and JENNIFER to proximal-mid LAD in 2020), hypertension, venous insufficiency, hyperlipidemia, and CKD.    Bridger is doing well from a cardiovascular standpoint.  Blood pressure and heart rate are within normal limits.  I will check in with Dr. Hartmann and see if we can discontinue Effient and have him continue with baby aspirin alone.  His lipid panel shows slight backslide in his numbers likely due to immobility.  He is already on high intensity statin therapy in addition to Zetia.  We talked about PCSK9 inhibitors.  Ultimately we decided to continue with current regimen and recheck next year when he is done back to his normal mobility level assuming he will be recovered from his back surgery.  If his numbers continue to be elevated at that point then we will pursue statin alternative options.    43 minutes spent on the date of the encounter with  chart review, patient visit, care coordination, and documentation.    The longitudinal plan of care for the diagnose(s)/condition(s) as documented were addressed during this visit. Due to the added complexity in care, I will continue to support Bridger Castillo  in the subsequent management and with ongoing continuity of care.     This note was completed in part using Dragon voice recognition software. Although reviewed after completion, some word and grammatical errors may occur.    Orders this Visit:  No orders of the defined types were placed in this encounter.    Orders Placed This Encounter   Medications    methylPREDNISolone (MEDROL) 4 MG tablet     Sig: Take 4 mg by mouth daily.    pregabalin (LYRICA) 75 MG capsule    acetaminophen (TYLENOL) 500 MG tablet     Sig: Take 500-1,000 mg by mouth every 6 hours as needed for mild pain.     Medications Discontinued During This Encounter   Medication Reason    prasugrel (EFFIENT) 10 MG TABS tablet          No diagnosis found.    CURRENT MEDICATIONS:  Current Outpatient Medications   Medication Sig Dispense Refill    acetaminophen (TYLENOL) 500 MG tablet Take 500-1,000 mg by mouth every 6 hours as needed for mild pain.      aspirin 81 MG EC tablet Take 1 tablet (81 mg) by mouth daily 90 tablet 1    B Complex Vitamins (B COMPLEX PO) Take 1 tablet by mouth daily      Cholecalciferol (VITAMIN D) 2000 UNITS CAPS Take 1 capsule by mouth daily      cinnamon 500 MG CAPS Take 1 capsule by mouth daily      ezetimibe (ZETIA) 10 MG tablet Take 1 tablet (10 mg) by mouth every evening. 90 tablet 0    lisinopril (ZESTRIL) 20 MG tablet Take 0.5 tablets (10 mg) by mouth daily. (0.5 x 20 mg = 10 mg) 45 tablet 0    methylPREDNISolone (MEDROL) 4 MG tablet Take 4 mg by mouth daily.      metoprolol succinate ER (TOPROL XL) 25 MG 24 hr tablet Take 1 tablet (25 mg) by mouth daily. 90 tablet 0    nitroGLYcerin (NITROSTAT) 0.4 MG sublingual tablet Place 1 tablet (0.4 mg) under the tongue  every 5 minutes as needed for chest pain 25 tablet 11    rosuvastatin (CRESTOR) 40 MG tablet Take 1 tablet (40 mg) by mouth daily. 90 tablet 0    pregabalin (LYRICA) 75 MG capsule          ALLERGIES   No Known Allergies    PAST MEDICAL HISTORY:  Past Medical History:   Diagnosis Date    Arthritis     CKD (chronic kidney disease) stage 3, GFR 30-59 ml/min (H)     Degenerative joint disease     Hyperlipemia     Hypertension     Ischemic cardiomyopathy     Left carpal tunnel syndrome     Myocardial infarction (H)     2011 STEMI    Post PTCA     JENNIFER to RCA 2011/// 2nd stent  2020 different artery       PAST SURGICAL HISTORY:  Past Surgical History:   Procedure Laterality Date    ARTHROPLASTY REVISION KNEE Right 4/17/2023    Procedure: Right Total Knee Arthroplasty Removal, Replacement with Antibiotic Spacer;  Surgeon: Anseth, Scott Duane, MD;  Location:  OR    ARTHROPLASTY REVISION KNEE Right 7/20/2023    Procedure: Right Total Knee Arthroplasty Revision;  Surgeon: Anseth, Scott Duane, MD;  Location:  OR    COLONOSCOPY N/A 7/16/2020    Procedure: COLONOSCOPY, WITH POLYPECTOMY AND BIOPSY;  Surgeon: Luis Beach MD;  Location:  GI    CV CORONARY ANGIOGRAM N/A 5/15/2020    Procedure: Coronary Angiogram;  Surgeon: August Hartmann MD;  Location:  HEART CARDIAC CATH LAB    CV PCI STENT DRUG ELUTING N/A 5/15/2020    Procedure: Percutaneous Coronary Intervention Stent Drug Eluting;  Surgeon: August Hartmann MD;  Location:  HEART CARDIAC CATH LAB    HEART CATH, ANGIOPLASTY      JENNIFER RCA 2011, 70% stenosis LAD    ORTHOPEDIC SURGERY      right knee replacement       FAMILY HISTORY:  Family History   Problem Relation Age of Onset    Hypertension Brother     Hypertension Father     Cancer - colorectal Father     Cancer Mother         lung    Other - See Comments Brother         heamachromatosis    Coronary Artery Disease Brother 61        MI       SOCIAL HISTORY:  Social History      Socioeconomic History    Marital status:    Tobacco Use    Smoking status: Never    Smokeless tobacco: Never   Substance and Sexual Activity    Alcohol use: Never     Alcohol/week: 1.0 standard drink of alcohol     Types: 1 Cans of beer per week    Drug use: Never   Other Topics Concern    Caffeine Concern Yes     Comment: ice tea 16oz    Special Diet No    Exercise Yes     Comment: biking, 10,000 steps daily     Seat Belt Yes    Parent/sibling w/ CABG, MI or angioplasty before 65F 55M? No     Social Drivers of Health     Financial Resource Strain: Low Risk  (3/23/2025)    [facility]    Financial Resource Strain     Difficulty of Paying Living Expenses: 3   Food Insecurity: No Food Insecurity (3/23/2025)    [facility]    Food Insecurity     Do you worry your food will run out before you are able to buy more?: 1   Transportation Needs: No Transportation Needs (3/23/2025)    [facility]    Transportation Needs     Does lack of transportation keep you from medical appointments?: 1     Does lack of transportation keep you from work, meetings or getting things that you need?: 1   Social Connections: Socially Integrated (3/23/2025)    Received from [employer] [facility]    Social Connections     Do you often feel lonely or isolated from those around you?: 0   Housing Stability: Low Risk  (3/23/2025)    Received from [employer] [facility]    Housing Stability     What is your housing situation today?: 1       Review of Systems:  Skin:        Eyes:       ENT:       Respiratory:       Cardiovascular:       Gastroenterology:      Genitourinary:       Musculoskeletal:       Neurologic:       Psychiatric:       Heme/Lymph/Imm:       Endocrine:         Physical Exam:  Vitals: There were no vitals taken for this visit.     GEN:   "NAD  NECK: No JVD  C/V:  Regular rate and rhythm, no murmur, rub or gallop.  RESP: Clear to auscultation bilaterally without wheezing, rales, or rhonchi.  GI: Abdomen soft, nontender, nondistended. No HSM appreciated.   EXTREM: No LE edema.   NEURO: Alert and oriented, cooperative. No obvious focal deficits.   PSYCH: Normal affect.  SKIN: Warm and dry.       Recent Lab Results:  LIPID RESULTS:  Lab Results   Component Value Date    CHOL 147 05/29/2019    HDL 49 05/29/2019    LDL 71 05/29/2019    TRIG 136 05/29/2019    CHOLHDLRATIO 2.7 03/27/2015       LIVER ENZYME RESULTS:  Lab Results   Component Value Date    AST 47 04/25/2023    AST 28 10/21/2014    ALT 16 05/29/2019       CBC RESULTS:  Lab Results   Component Value Date    WBC 6.7 05/29/2023    WBC 6.1 05/15/2020    RBC 4.10 (L) 05/29/2023    RBC 4.72 05/15/2020    HGB 11.1 (L) 07/21/2023    HGB 15.5 05/15/2020    HCT 40.5 05/29/2023    HCT 45.7 05/15/2020    MCV 99 05/29/2023    MCV 97 05/15/2020    MCH 31.5 05/29/2023    MCH 32.8 05/15/2020    MCHC 31.9 05/29/2023    MCHC 33.9 05/15/2020    RDW 12.5 05/29/2023    RDW 12.3 05/15/2020     05/29/2023     05/15/2020       BMP RESULTS:  Lab Results   Component Value Date     07/21/2023     05/15/2020    POTASSIUM 4.8 07/21/2023    POTASSIUM 4.2 05/15/2020    CHLORIDE 103 07/21/2023    CHLORIDE 105 05/15/2020    CO2 20 (L) 07/21/2023    CO2 26 05/15/2020    ANIONGAP 13 07/21/2023    ANIONGAP 6 05/15/2020     (H) 07/22/2023     (H) 05/15/2020    BUN 29.5 (H) 07/21/2023    BUN 27 05/15/2020    CR 1.70 (H) 07/21/2023    CR 1.46 (H) 05/15/2020    GFRESTIMATED 43 (L) 07/21/2023    GFRESTIMATED 49 (L) 05/15/2020    GFRESTBLACK 57 (L) 05/15/2020    RYANNE 8.8 07/21/2023    RYANNE 9.6 05/15/2020        A1C RESULTS:  No results found for: \"A1C\"    INR RESULTS:  Lab Results   Component Value Date    INR 1.02 05/15/2020    INR 0.85 (L) 12/19/2011             Gill Young PA-C  April " 1, 2025

## 2025-04-01 NOTE — LETTER
4/1/2025    GAURAV CARNEY MD  "[employer] [address]  [place] [address]    RE: Bridger Adornoconnie       Dear Colleague,     I had the pleasure of seeing Bridger Castillo in the [facility].  Primary Cardiologist: Dr. Hartmann    Reason for Visit: Annual follow up    History of Present Illness:   Bridger is a very pleasant 74-year-old male with past medical history notable for CAD (remote history of STEMI back in 2001 and s/p aspiration atherectomy and JENNIFER to RCA and JENNIFER to proximal-mid LAD in 2020), hypertension, venous insufficiency, hyperlipidemia, and CKD.    He returns to clinic today stating he is doing well.  He denies exertional chest discomfort, shortness of breath, orthopnea, PND, or peripheral edema.  He is hoping to undergo back surgery later this spring.  He is seeing specialist regarding this.  He has been on Effient and aspirin long-term.  He has no history of stroke or TIA.  His cholesterol numbers are little higher this year but he tells me he has not been very active due to his back issues.  He is already on maximum dose of rosuvastatin as well as ezetimibe.    Assessment and Plan:  Bridger is a very pleasant 74-year-old male with past medical history notable for CAD (remote history of STEMI back in 2001 and s/p aspiration atherectomy and JENNIFER to RCA and JENNIFER to proximal-mid LAD in 2020), hypertension, venous insufficiency, hyperlipidemia, and CKD.    Bridger is doing well from a cardiovascular standpoint.  Blood pressure and heart rate are within normal limits.  I will check in with Dr. Hartmann and see if we can discontinue Effient and have him continue with baby aspirin alone.  His lipid panel shows slight backslide in his numbers likely due to immobility.  He is already on high intensity statin therapy in addition to Zetia.  We talked about PCSK9 inhibitors.  Ultimately we decided to continue with current regimen and recheck next year when he is done back to his normal mobility  level assuming he will be recovered from his back surgery.  If his numbers continue to be elevated at that point then we will pursue statin alternative options.    43 minutes spent on the date of the encounter with chart review, patient visit, care coordination, and documentation.    The longitudinal plan of care for the diagnose(s)/condition(s) as documented were addressed during this visit. Due to the added complexity in care, I will continue to support Bridger Castillo  in the subsequent management and with ongoing continuity of care.     This note was completed in part using Dragon voice recognition software. Although reviewed after completion, some word and grammatical errors may occur.    Orders this Visit:  No orders of the defined types were placed in this encounter.    Orders Placed This Encounter   Medications     methylPREDNISolone (MEDROL) 4 MG tablet     Sig: Take 4 mg by mouth daily.     pregabalin (LYRICA) 75 MG capsule     acetaminophen (TYLENOL) 500 MG tablet     Sig: Take 500-1,000 mg by mouth every 6 hours as needed for mild pain.     Medications Discontinued During This Encounter   Medication Reason     prasugrel (EFFIENT) 10 MG TABS tablet          No diagnosis found.    CURRENT MEDICATIONS:  Current Outpatient Medications   Medication Sig Dispense Refill     acetaminophen (TYLENOL) 500 MG tablet Take 500-1,000 mg by mouth every 6 hours as needed for mild pain.       aspirin 81 MG EC tablet Take 1 tablet (81 mg) by mouth daily 90 tablet 1     B Complex Vitamins (B COMPLEX PO) Take 1 tablet by mouth daily       Cholecalciferol (VITAMIN D) 2000 UNITS CAPS Take 1 capsule by mouth daily       cinnamon 500 MG CAPS Take 1 capsule by mouth daily       ezetimibe (ZETIA) 10 MG tablet Take 1 tablet (10 mg) by mouth every evening. 90 tablet 0     lisinopril (ZESTRIL) 20 MG tablet Take 0.5 tablets (10 mg) by mouth daily. (0.5 x 20 mg = 10 mg) 45 tablet 0     methylPREDNISolone (MEDROL) 4 MG tablet Take 4  mg by mouth daily.       metoprolol succinate ER (TOPROL XL) 25 MG 24 hr tablet Take 1 tablet (25 mg) by mouth daily. 90 tablet 0     nitroGLYcerin (NITROSTAT) 0.4 MG sublingual tablet Place 1 tablet (0.4 mg) under the tongue every 5 minutes as needed for chest pain 25 tablet 11     rosuvastatin (CRESTOR) 40 MG tablet Take 1 tablet (40 mg) by mouth daily. 90 tablet 0     pregabalin (LYRICA) 75 MG capsule          ALLERGIES   No Known Allergies    PAST MEDICAL HISTORY:  Past Medical History:   Diagnosis Date     Arthritis      CKD (chronic kidney disease) stage 3, GFR 30-59 ml/min (H)      Degenerative joint disease      Hyperlipemia      Hypertension      Ischemic cardiomyopathy      Left carpal tunnel syndrome      Myocardial infarction (H)     2011 STEMI     Post PTCA     JENNIFER to RCA 2011/// 2nd stent  2020 different artery       PAST SURGICAL HISTORY:  Past Surgical History:   Procedure Laterality Date     ARTHROPLASTY REVISION KNEE Right 4/17/2023    Procedure: Right Total Knee Arthroplasty Removal, Replacement with Antibiotic Spacer;  Surgeon: Anseth, Scott Duane, MD;  Location:  OR     ARTHROPLASTY REVISION KNEE Right 7/20/2023    Procedure: Right Total Knee Arthroplasty Revision;  Surgeon: Anseth, Scott Duane, MD;  Location:  OR     COLONOSCOPY N/A 7/16/2020    Procedure: COLONOSCOPY, WITH POLYPECTOMY AND BIOPSY;  Surgeon: Luis Beach MD;  Location:  GI     CV CORONARY ANGIOGRAM N/A 5/15/2020    Procedure: Coronary Angiogram;  Surgeon: August Hartmann MD;  Location:  HEART CARDIAC CATH LAB     CV PCI STENT DRUG ELUTING N/A 5/15/2020    Procedure: Percutaneous Coronary Intervention Stent Drug Eluting;  Surgeon: August Hartmann MD;  Location:  HEART CARDIAC CATH LAB     HEART CATH, ANGIOPLASTY      JENNIFER RCA 2011, 70% stenosis LAD     ORTHOPEDIC SURGERY      right knee replacement       FAMILY HISTORY:  Family History   Problem Relation Age of Onset     Hypertension Brother       Hypertension Father      Cancer - colorectal Father      Cancer Mother         lung     Other - See Comments Brother         heamachromatosis     Coronary Artery Disease Brother 61        MI       SOCIAL HISTORY:  Social History     Socioeconomic History     Marital status:    Tobacco Use     Smoking status: Never     Smokeless tobacco: Never   Substance and Sexual Activity     Alcohol use: Never     Alcohol/week: 1.0 standard drink of alcohol     Types: 1 Cans of beer per week     Drug use: Never   Other Topics Concern     Caffeine Concern Yes     Comment: ice tea 16oz     Special Diet No     Exercise Yes     Comment: biking, 10,000 steps daily      Seat Belt Yes     Parent/sibling w/ CABG, MI or angioplasty before 65F 55M? No     Social Drivers of Health     Financial Resource Strain: Low Risk  (3/23/2025)    Received from [employer]    Financial Resource Strain      Difficulty of Paying Living Expenses: 3   Food Insecurity: No Food Insecurity (3/23/2025)    Received from [employer]    Food Insecurity      Do you worry your food will run out before you are able to buy more?: 1   Transportation Needs: No Transportation Needs (3/23/2025)    Received from [employer]    Transportation Needs      Does lack of transportation keep you from medical appointments?: 1      Does lack of transportation keep you from work, meetings or getting things that you need?: 1   Social Connections: Socially Integrated (3/23/2025)    Received from [employer]    Social Connections      Do you often feel lonely or isolated from those around you?: 0   Housing Stability: Low Risk  (3/23/2025)    Received from [employer]    Housing Stability      What is your housing situation today?: 1       Review of Systems:  Skin:        Eyes:       ENT:       Respiratory:        Cardiovascular:       Gastroenterology:      Genitourinary:       Musculoskeletal:       Neurologic:       Psychiatric:       Heme/Lymph/Imm:       Endocrine:         Physical Exam:  Vitals: There were no vitals taken for this visit.     GEN:  NAD  NECK: No JVD  C/V:  Regular rate and rhythm, no murmur, rub or gallop.  RESP: Clear to auscultation bilaterally without wheezing, rales, or rhonchi.  GI: Abdomen soft, nontender, nondistended. No HSM appreciated.   EXTREM: No LE edema.   NEURO: Alert and oriented, cooperative. No obvious focal deficits.   PSYCH: Normal affect.  SKIN: Warm and dry.       Recent Lab Results:  LIPID RESULTS:  Lab Results   Component Value Date    CHOL 147 05/29/2019    HDL 49 05/29/2019    LDL 71 05/29/2019    TRIG 136 05/29/2019    CHOLHDLRATIO 2.7 03/27/2015       LIVER ENZYME RESULTS:  Lab Results   Component Value Date    AST 47 04/25/2023    AST 28 10/21/2014    ALT 16 05/29/2019       CBC RESULTS:  Lab Results   Component Value Date    WBC 6.7 05/29/2023    WBC 6.1 05/15/2020    RBC 4.10 (L) 05/29/2023    RBC 4.72 05/15/2020    HGB 11.1 (L) 07/21/2023    HGB 15.5 05/15/2020    HCT 40.5 05/29/2023    HCT 45.7 05/15/2020    MCV 99 05/29/2023    MCV 97 05/15/2020    MCH 31.5 05/29/2023    MCH 32.8 05/15/2020    MCHC 31.9 05/29/2023    MCHC 33.9 05/15/2020    RDW 12.5 05/29/2023    RDW 12.3 05/15/2020     05/29/2023     05/15/2020       BMP RESULTS:  Lab Results   Component Value Date     07/21/2023     05/15/2020    POTASSIUM 4.8 07/21/2023    POTASSIUM 4.2 05/15/2020    CHLORIDE 103 07/21/2023    CHLORIDE 105 05/15/2020    CO2 20 (L) 07/21/2023    CO2 26 05/15/2020    ANIONGAP 13 07/21/2023    ANIONGAP 6 05/15/2020     (H) 07/22/2023     (H) 05/15/2020    BUN 29.5 (H) 07/21/2023    BUN 27 05/15/2020    CR 1.70 (H) 07/21/2023    CR 1.46 (H) 05/15/2020    GFRESTIMATED 43 (L) 07/21/2023    GFRESTIMATED 49 (L) 05/15/2020    GFRESTBLACK 57 (L)  "05/15/2020    RYANNE 8.8 07/21/2023    RYANNE 9.6 05/15/2020        A1C RESULTS:  No results found for: \"A1C\"    INR RESULTS:  Lab Results   Component Value Date    INR 1.02 05/15/2020    INR 0.85 (L) 12/19/2011             Gill Young PA-C  April 1, 2025       Thank you for allowing me to participate in the care of your patient.      Sincerely,     Gill Young PA-C     [facility]  cc:   August Hartmann MD  [id number]  [place] [id number]      "

## 2025-07-15 DIAGNOSIS — I25.10 CORONARY ARTERY DISEASE INVOLVING NATIVE CORONARY ARTERY OF NATIVE HEART WITHOUT ANGINA PECTORIS: ICD-10-CM

## 2025-07-15 DIAGNOSIS — I21.3 ST ELEVATION MYOCARDIAL INFARCTION (STEMI), UNSPECIFIED ARTERY (H): ICD-10-CM

## 2025-07-15 DIAGNOSIS — E78.5 HYPERLIPIDEMIA LDL GOAL <70: ICD-10-CM

## 2025-07-15 RX ORDER — METOPROLOL SUCCINATE 25 MG/1
25 TABLET, EXTENDED RELEASE ORAL DAILY
Qty: 90 TABLET | Refills: 3 | Status: SHIPPED | OUTPATIENT
Start: 2025-07-15

## 2025-07-15 RX ORDER — EZETIMIBE 10 MG/1
10 TABLET ORAL EVERY EVENING
Qty: 90 TABLET | Refills: 3 | Status: SHIPPED | OUTPATIENT
Start: 2025-07-15

## 2025-07-15 RX ORDER — ROSUVASTATIN CALCIUM 40 MG/1
40 TABLET, COATED ORAL DAILY
Qty: 90 TABLET | Refills: 3 | Status: SHIPPED | OUTPATIENT
Start: 2025-07-15

## (undated) DEVICE — SUCTION IRR SYSTEM W/O TIP INTERPULSE HANDPIECE 0210-100-000

## (undated) DEVICE — BLADE CLIPPER 4412A

## (undated) DEVICE — DRSG GAUZE 4X4" 3033

## (undated) DEVICE — GLOVE BIOGEL PI SZ 6.5 40865

## (undated) DEVICE — WRAP EZY KNEE

## (undated) DEVICE — GOWN XXLG REINFORCED 9071EL

## (undated) DEVICE — CANISTER WOUND VAC W/GEL 1000ML M8275093/5

## (undated) DEVICE — STPL SKIN 35W 6.9MM  PXW35

## (undated) DEVICE — DRSG AQUACEL AG 3.5X9.75" HYDROFIBER 412011

## (undated) DEVICE — BONE CLEANING TIP INTERPULSE  0210-010-000

## (undated) DEVICE — CAST PADDING 6" UNSTERILE 9046

## (undated) DEVICE — DRAPE STERI TOWEL LG 1010

## (undated) DEVICE — STENT RESOLUTE ONYX DE 2.7FR 3.00X34MM RONYX DE30034UX: Type: IMPLANTABLE DEVICE | Status: NON-FUNCTIONAL

## (undated) DEVICE — DRAPE IOBAN INCISE 23X17" 6650EZ

## (undated) DEVICE — GLOVE BIOGEL PI MICRO INDICATOR UNDERGLOVE SZ 7.0 48970

## (undated) DEVICE — DRSG ADAPTIC 3X8" 6113

## (undated) DEVICE — GLOVE BIOGEL PI SZ 7.5 40875

## (undated) DEVICE — BLADE PRECISION 25X1.27X9 6725127090

## (undated) DEVICE — PUMP UNIT NEGATIVE PRESSURE PREVENA PLUS PRE4010.S

## (undated) DEVICE — INTRO SHEATH 6FRX10CM PINNACLE RSS602

## (undated) DEVICE — DRAPE STERI U 1015

## (undated) DEVICE — BONE CLEANING TIP INTERPULSE FEMORAL CANAL 0210-008-000

## (undated) DEVICE — BLADE OSTEOTOME 10X3MM THIN 2709-04-005

## (undated) DEVICE — SU VICRYL 1 CTX CR 8X18" J765D

## (undated) DEVICE — GLOVE BIOGEL PI MICRO SZ 8.0 48580

## (undated) DEVICE — SOLUTION WOUND CLEANSING 3/4OZ 10% PVP EA-L3011FB-50

## (undated) DEVICE — GLOVE BIOGEL PI MICRO INDICATOR UNDERGLOVE SZ 8.0 48980

## (undated) DEVICE — SYR 30ML LL W/O NDL 302832

## (undated) DEVICE — MANIFOLD NEPTUNE 4 PORT 700-20

## (undated) DEVICE — TOTE ANGIO CORP PC15AT SAN32CC83O

## (undated) DEVICE — NDL 21GA 1.5"

## (undated) DEVICE — CATH ANGIO INFINITI 3DRC 4FRX100CM 538476

## (undated) DEVICE — HOOD SURG T7PLUS PEEL AWAY FACE SHIELD STRL LF 0416-801-100

## (undated) DEVICE — BLADE SAW SAGITTAL 18.5X63X0.64MM 4/2000 SYS 2108-120-006

## (undated) DEVICE — Device

## (undated) DEVICE — CATH DIAG 4FR JL 4.5 538417

## (undated) DEVICE — SOL NACL 0.9% IRRIG 1000ML BOTTLE 2F7124

## (undated) DEVICE — SOL NACL 0.9% IRRIG 3000ML BAG 2B7477

## (undated) DEVICE — MANIFOLD KIT ANGIO AUTOMATED 014613

## (undated) DEVICE — CAST PADDING 6" STERILE 9046S

## (undated) DEVICE — TOOL DISSECT MIDAS MR8 14CM BALL 5MM DIA MR8-14BA50

## (undated) DEVICE — DEFIB PRO-PADZ LVP LQD GEL ADULT 8900-2105-01

## (undated) DEVICE — BLADE SAW SAGITTAL STRK 13X90X1.27MM HD SYS 6 6113-127-090

## (undated) DEVICE — LINEN TOWEL PACK X5 5464

## (undated) DEVICE — GOWN IMPERVIOUS SPECIALTY XLG/XLONG 32474

## (undated) DEVICE — CATH TRAY FOLEY COUDE SURESTEP 16FR W/URNE MTR STLK A304716A

## (undated) DEVICE — BONE CEMENT MIXEVAC HI VAC W/CARTRIDGE 0306-563-000

## (undated) DEVICE — DRSG KERLIX FLUFFS X5

## (undated) DEVICE — CATH BALLOON NC EMERGE 3.25X20MM H7493926720320

## (undated) DEVICE — VALVE HEMOSTASIS .096" COPILOT MECH 1003331

## (undated) DEVICE — PACK TOTAL KNEE SOP15TKFSD

## (undated) DEVICE — CLOSURE ANGIOSEAL 6FR 610130

## (undated) DEVICE — CATH BALLOON NC EMERGE 3.50X20MM H7493926720350

## (undated) DEVICE — DRSG WND NEGATIVE PRESSURE PREVENA 90CM PRE4055US

## (undated) DEVICE — NDL PERC ENTRY THINWALL 18GA 7.0" G00166

## (undated) DEVICE — DECANTER BAG 2002S

## (undated) DEVICE — SOL WATER IRRIG 1000ML BOTTLE 2F7114

## (undated) DEVICE — ESU GROUND PAD UNIVERSAL W/O CORD

## (undated) DEVICE — CAST PADDING 4" COTTON WEBRIL UNSTERILE 9084

## (undated) DEVICE — INFL DVC KIT W/10CC NITRO IN4530

## (undated) DEVICE — WRAP EZY KNEE 1213PP

## (undated) DEVICE — BLADE KNIFE SURG 11 371111

## (undated) DEVICE — INTRO SHEATH 4FRX10CM PINNACLE RSS402

## (undated) DEVICE — SOL BENZOIN 0.5OZ

## (undated) DEVICE — CATH BALLOON EMERGE 2.75X20MMH7493918920270

## (undated) DEVICE — SU VICRYL 2-0 CT 36" J957H

## (undated) DEVICE — KIT HAND CONTROL ANGIOTOUCH ACIST 65CM AT-P65

## (undated) DEVICE — CATH GUIDING BLUE YELLOW PTFE XB3.5 6FRX100CM 67005400

## (undated) DEVICE — GUIDEWIRE VASC 0.014INX180CM RUNTHROUGH 25-1011

## (undated) DEVICE — CATH BALLOON EMERGE 2.5X8MM H7493918908250

## (undated) DEVICE — BLADE SAW RECIP STRK 70X12.5X0.80MM 0277-096-277

## (undated) RX ORDER — VANCOMYCIN HYDROCHLORIDE 1 G/20ML
INJECTION, POWDER, LYOPHILIZED, FOR SOLUTION INTRAVENOUS
Status: DISPENSED
Start: 2023-04-17

## (undated) RX ORDER — HYDROMORPHONE HCL IN WATER/PF 6 MG/30 ML
PATIENT CONTROLLED ANALGESIA SYRINGE INTRAVENOUS
Status: DISPENSED
Start: 2023-04-17

## (undated) RX ORDER — TRANEXAMIC ACID 650 MG/1
TABLET ORAL
Status: DISPENSED
Start: 2023-04-17

## (undated) RX ORDER — CEFAZOLIN SODIUM/WATER 2 G/20 ML
SYRINGE (ML) INTRAVENOUS
Status: DISPENSED
Start: 2023-07-20

## (undated) RX ORDER — PROPOFOL 10 MG/ML
INJECTION, EMULSION INTRAVENOUS
Status: DISPENSED
Start: 2023-04-17

## (undated) RX ORDER — FENTANYL CITRATE 50 UG/ML
INJECTION, SOLUTION INTRAMUSCULAR; INTRAVENOUS
Status: DISPENSED
Start: 2023-04-17

## (undated) RX ORDER — FENTANYL CITRATE 50 UG/ML
INJECTION, SOLUTION INTRAMUSCULAR; INTRAVENOUS
Status: DISPENSED
Start: 2023-07-20

## (undated) RX ORDER — HYDROMORPHONE HYDROCHLORIDE 1 MG/ML
INJECTION, SOLUTION INTRAMUSCULAR; INTRAVENOUS; SUBCUTANEOUS
Status: DISPENSED
Start: 2023-07-20

## (undated) RX ORDER — FENTANYL CITRATE 0.05 MG/ML
INJECTION, SOLUTION INTRAMUSCULAR; INTRAVENOUS
Status: DISPENSED
Start: 2023-04-17

## (undated) RX ORDER — ACETAMINOPHEN 325 MG/1
TABLET ORAL
Status: DISPENSED
Start: 2023-04-17

## (undated) RX ORDER — HYDROMORPHONE HYDROCHLORIDE 1 MG/ML
INJECTION, SOLUTION INTRAMUSCULAR; INTRAVENOUS; SUBCUTANEOUS
Status: DISPENSED
Start: 2023-04-17

## (undated) RX ORDER — MINERAL OIL
OIL (ML) MISCELLANEOUS
Status: DISPENSED
Start: 2023-04-17

## (undated) RX ORDER — DEXAMETHASONE SODIUM PHOSPHATE 4 MG/ML
INJECTION, SOLUTION INTRA-ARTICULAR; INTRALESIONAL; INTRAMUSCULAR; INTRAVENOUS; SOFT TISSUE
Status: DISPENSED
Start: 2023-07-20

## (undated) RX ORDER — PROPOFOL 10 MG/ML
INJECTION, EMULSION INTRAVENOUS
Status: DISPENSED
Start: 2023-07-20

## (undated) RX ORDER — VANCOMYCIN HYDROCHLORIDE 1 G/20ML
INJECTION, POWDER, LYOPHILIZED, FOR SOLUTION INTRAVENOUS
Status: DISPENSED
Start: 2023-07-20

## (undated) RX ORDER — ONDANSETRON 2 MG/ML
INJECTION INTRAMUSCULAR; INTRAVENOUS
Status: DISPENSED
Start: 2023-07-20

## (undated) RX ORDER — TOBRAMYCIN 1.2 G/30ML
INJECTION, POWDER, LYOPHILIZED, FOR SOLUTION INTRAVENOUS
Status: DISPENSED
Start: 2023-04-17

## (undated) RX ORDER — FENTANYL CITRATE 50 UG/ML
INJECTION, SOLUTION INTRAMUSCULAR; INTRAVENOUS
Status: DISPENSED
Start: 2020-05-15

## (undated) RX ORDER — TRANEXAMIC ACID 650 MG/1
TABLET ORAL
Status: DISPENSED
Start: 2023-07-20

## (undated) RX ORDER — ACETAMINOPHEN 325 MG/1
TABLET ORAL
Status: DISPENSED
Start: 2023-07-20

## (undated) RX ORDER — CEFAZOLIN SODIUM 1 G/3ML
INJECTION, POWDER, FOR SOLUTION INTRAMUSCULAR; INTRAVENOUS
Status: DISPENSED
Start: 2023-04-17

## (undated) RX ORDER — HYDROXYZINE HYDROCHLORIDE 25 MG/1
TABLET, FILM COATED ORAL
Status: DISPENSED
Start: 2023-04-17